# Patient Record
Sex: MALE | Race: WHITE | NOT HISPANIC OR LATINO | Employment: OTHER | URBAN - METROPOLITAN AREA
[De-identification: names, ages, dates, MRNs, and addresses within clinical notes are randomized per-mention and may not be internally consistent; named-entity substitution may affect disease eponyms.]

---

## 2022-01-01 ENCOUNTER — HOSPITAL ENCOUNTER (INPATIENT)
Facility: HOSPITAL | Age: 67
LOS: 17 days | Discharge: HOME | End: 2022-01-19
Attending: EMERGENCY MEDICINE | Admitting: INTERNAL MEDICINE

## 2022-01-01 ENCOUNTER — APPOINTMENT (OUTPATIENT)
Dept: CT IMAGING | Facility: HOSPITAL | Age: 67
End: 2022-01-01

## 2022-01-01 DIAGNOSIS — J94.2 HEMOTHORAX ON RIGHT: ICD-10-CM

## 2022-01-01 DIAGNOSIS — D62 ACUTE BLOOD LOSS ANEMIA: ICD-10-CM

## 2022-01-01 DIAGNOSIS — Z95.2 MECHANICAL HEART VALVE PRESENT: ICD-10-CM

## 2022-01-01 DIAGNOSIS — Z79.01 CHRONIC ANTICOAGULATION: ICD-10-CM

## 2022-01-01 DIAGNOSIS — J94.2 HEMOTHORAX: ICD-10-CM

## 2022-01-01 DIAGNOSIS — S30.1XXA ABDOMINAL WALL HEMATOMA, INITIAL ENCOUNTER: Primary | ICD-10-CM

## 2022-01-01 LAB
ALBUMIN SERPL-MCNC: 3.8 G/DL (ref 3.5–5.2)
ALBUMIN/GLOB SERPL: 1.7 G/DL
ALP SERPL-CCNC: 58 U/L (ref 39–117)
ALT SERPL W P-5'-P-CCNC: 21 U/L (ref 1–41)
ANION GAP SERPL CALCULATED.3IONS-SCNC: 8.9 MMOL/L (ref 5–15)
AST SERPL-CCNC: 27 U/L (ref 1–40)
BASOPHILS # BLD AUTO: 0.05 10*3/MM3 (ref 0–0.2)
BASOPHILS NFR BLD AUTO: 0.5 % (ref 0–1.5)
BILIRUB SERPL-MCNC: 0.7 MG/DL (ref 0–1.2)
BUN SERPL-MCNC: 13 MG/DL (ref 8–23)
BUN/CREAT SERPL: 17.1 (ref 7–25)
CALCIUM SPEC-SCNC: 8.8 MG/DL (ref 8.6–10.5)
CHLORIDE SERPL-SCNC: 96 MMOL/L (ref 98–107)
CO2 SERPL-SCNC: 26.1 MMOL/L (ref 22–29)
CREAT SERPL-MCNC: 0.76 MG/DL (ref 0.76–1.27)
DEPRECATED RDW RBC AUTO: 45.8 FL (ref 37–54)
EOSINOPHIL # BLD AUTO: 0.1 10*3/MM3 (ref 0–0.4)
EOSINOPHIL NFR BLD AUTO: 0.9 % (ref 0.3–6.2)
ERYTHROCYTE [DISTWIDTH] IN BLOOD BY AUTOMATED COUNT: 13 % (ref 12.3–15.4)
GFR SERPL CREATININE-BSD FRML MDRD: 103 ML/MIN/1.73
GLOBULIN UR ELPH-MCNC: 2.2 GM/DL
GLUCOSE SERPL-MCNC: 121 MG/DL (ref 65–99)
HCT VFR BLD AUTO: 26.6 % (ref 37.5–51)
HGB BLD-MCNC: 9 G/DL (ref 13–17.7)
IMM GRANULOCYTES # BLD AUTO: 0.09 10*3/MM3 (ref 0–0.05)
IMM GRANULOCYTES NFR BLD AUTO: 0.8 % (ref 0–0.5)
INR PPP: 2 (ref 0.9–1.1)
INR PPP: 2.14 (ref 0.9–1.1)
LYMPHOCYTES # BLD AUTO: 1.08 10*3/MM3 (ref 0.7–3.1)
LYMPHOCYTES NFR BLD AUTO: 9.9 % (ref 19.6–45.3)
MCH RBC QN AUTO: 32.5 PG (ref 26.6–33)
MCHC RBC AUTO-ENTMCNC: 33.8 G/DL (ref 31.5–35.7)
MCV RBC AUTO: 96 FL (ref 79–97)
MONOCYTES # BLD AUTO: 0.84 10*3/MM3 (ref 0.1–0.9)
MONOCYTES NFR BLD AUTO: 7.7 % (ref 5–12)
NEUTROPHILS NFR BLD AUTO: 8.8 10*3/MM3 (ref 1.7–7)
NEUTROPHILS NFR BLD AUTO: 80.2 % (ref 42.7–76)
NRBC BLD AUTO-RTO: 0.1 /100 WBC (ref 0–0.2)
PLATELET # BLD AUTO: 217 10*3/MM3 (ref 140–450)
PMV BLD AUTO: 9.6 FL (ref 6–12)
POTASSIUM SERPL-SCNC: 4.3 MMOL/L (ref 3.5–5.2)
PROT SERPL-MCNC: 6 G/DL (ref 6–8.5)
PROTHROMBIN TIME: 22.5 SECONDS (ref 11.7–14.2)
PROTHROMBIN TIME: 23.7 SECONDS (ref 11.7–14.2)
RBC # BLD AUTO: 2.77 10*6/MM3 (ref 4.14–5.8)
SARS-COV-2 RNA RESP QL NAA+PROBE: NOT DETECTED
SODIUM SERPL-SCNC: 131 MMOL/L (ref 136–145)
WBC NRBC COR # BLD: 10.96 10*3/MM3 (ref 3.4–10.8)

## 2022-01-01 PROCEDURE — 25010000002 IOPAMIDOL 61 % SOLUTION: Performed by: EMERGENCY MEDICINE

## 2022-01-01 PROCEDURE — G0378 HOSPITAL OBSERVATION PER HR: HCPCS

## 2022-01-01 PROCEDURE — 85025 COMPLETE CBC W/AUTO DIFF WBC: CPT | Performed by: EMERGENCY MEDICINE

## 2022-01-01 PROCEDURE — 85610 PROTHROMBIN TIME: CPT | Performed by: EMERGENCY MEDICINE

## 2022-01-01 PROCEDURE — 85610 PROTHROMBIN TIME: CPT | Performed by: INTERNAL MEDICINE

## 2022-01-01 PROCEDURE — 99284 EMERGENCY DEPT VISIT MOD MDM: CPT

## 2022-01-01 PROCEDURE — U0003 INFECTIOUS AGENT DETECTION BY NUCLEIC ACID (DNA OR RNA); SEVERE ACUTE RESPIRATORY SYNDROME CORONAVIRUS 2 (SARS-COV-2) (CORONAVIRUS DISEASE [COVID-19]), AMPLIFIED PROBE TECHNIQUE, MAKING USE OF HIGH THROUGHPUT TECHNOLOGIES AS DESCRIBED BY CMS-2020-01-R: HCPCS | Performed by: EMERGENCY MEDICINE

## 2022-01-01 PROCEDURE — 71250 CT THORAX DX C-: CPT

## 2022-01-01 PROCEDURE — 80053 COMPREHEN METABOLIC PANEL: CPT | Performed by: EMERGENCY MEDICINE

## 2022-01-01 PROCEDURE — 36415 COLL VENOUS BLD VENIPUNCTURE: CPT | Performed by: INTERNAL MEDICINE

## 2022-01-01 PROCEDURE — 74177 CT ABD & PELVIS W/CONTRAST: CPT

## 2022-01-01 RX ORDER — LISINOPRIL 10 MG/1
10 TABLET ORAL DAILY
Status: DISCONTINUED | OUTPATIENT
Start: 2022-01-02 | End: 2022-01-02

## 2022-01-01 RX ORDER — LOVASTATIN 40 MG/1
40 TABLET ORAL NIGHTLY
COMMUNITY

## 2022-01-01 RX ORDER — ASPIRIN 81 MG/1
81 TABLET, CHEWABLE ORAL DAILY
COMMUNITY
End: 2022-01-19 | Stop reason: HOSPADM

## 2022-01-01 RX ORDER — LISINOPRIL 10 MG/1
10 TABLET ORAL DAILY
COMMUNITY

## 2022-01-01 RX ORDER — BENZONATATE 100 MG/1
100 CAPSULE ORAL DAILY
COMMUNITY
Start: 2021-12-29 | End: 2022-01-19 | Stop reason: HOSPADM

## 2022-01-01 RX ORDER — NITROGLYCERIN 0.4 MG/1
0.4 TABLET SUBLINGUAL
Status: DISCONTINUED | OUTPATIENT
Start: 2022-01-01 | End: 2022-01-19 | Stop reason: HOSPADM

## 2022-01-01 RX ORDER — ATORVASTATIN CALCIUM 20 MG/1
10 TABLET, FILM COATED ORAL DAILY
Status: DISCONTINUED | OUTPATIENT
Start: 2022-01-02 | End: 2022-01-19 | Stop reason: HOSPADM

## 2022-01-01 RX ORDER — ACETAMINOPHEN 325 MG/1
650 TABLET ORAL EVERY 4 HOURS PRN
Status: DISCONTINUED | OUTPATIENT
Start: 2022-01-01 | End: 2022-01-19 | Stop reason: HOSPADM

## 2022-01-01 RX ORDER — AMOXICILLIN AND CLAVULANATE POTASSIUM 875; 125 MG/1; MG/1
1 TABLET, FILM COATED ORAL 2 TIMES DAILY
COMMUNITY
End: 2022-01-19 | Stop reason: HOSPADM

## 2022-01-01 RX ORDER — AZITHROMYCIN 250 MG/1
250 TABLET, FILM COATED ORAL DAILY
COMMUNITY
Start: 2021-12-29 | End: 2022-01-19 | Stop reason: HOSPADM

## 2022-01-01 RX ORDER — UREA 10 %
3 LOTION (ML) TOPICAL NIGHTLY PRN
Status: DISCONTINUED | OUTPATIENT
Start: 2022-01-01 | End: 2022-01-19 | Stop reason: HOSPADM

## 2022-01-01 RX ORDER — ASPIRIN 81 MG/1
81 TABLET, CHEWABLE ORAL DAILY
Status: DISCONTINUED | OUTPATIENT
Start: 2022-01-02 | End: 2022-01-10

## 2022-01-01 RX ORDER — BENZONATATE 100 MG/1
100 CAPSULE ORAL DAILY
Status: COMPLETED | OUTPATIENT
Start: 2022-01-02 | End: 2022-01-05

## 2022-01-01 RX ORDER — ONDANSETRON 2 MG/ML
4 INJECTION INTRAMUSCULAR; INTRAVENOUS EVERY 6 HOURS PRN
Status: DISCONTINUED | OUTPATIENT
Start: 2022-01-01 | End: 2022-01-19 | Stop reason: HOSPADM

## 2022-01-01 RX ORDER — WARFARIN SODIUM 5 MG/1
5 TABLET ORAL
COMMUNITY

## 2022-01-01 RX ORDER — METOPROLOL SUCCINATE 100 MG/1
100 TABLET, EXTENDED RELEASE ORAL DAILY
Status: ON HOLD | COMMUNITY
End: 2022-01-19 | Stop reason: SDUPTHER

## 2022-01-01 RX ORDER — METOPROLOL SUCCINATE 100 MG/1
100 TABLET, EXTENDED RELEASE ORAL DAILY
Status: DISCONTINUED | OUTPATIENT
Start: 2022-01-02 | End: 2022-01-10

## 2022-01-01 RX ORDER — ONDANSETRON 4 MG/1
4 TABLET, FILM COATED ORAL EVERY 6 HOURS PRN
Status: DISCONTINUED | OUTPATIENT
Start: 2022-01-01 | End: 2022-01-19 | Stop reason: HOSPADM

## 2022-01-01 RX ORDER — SODIUM CHLORIDE 0.9 % (FLUSH) 0.9 %
10 SYRINGE (ML) INJECTION AS NEEDED
Status: DISCONTINUED | OUTPATIENT
Start: 2022-01-01 | End: 2022-01-19 | Stop reason: HOSPADM

## 2022-01-01 RX ADMIN — IOPAMIDOL 85 ML: 612 INJECTION, SOLUTION INTRAVENOUS at 15:49

## 2022-01-01 NOTE — ED TRIAGE NOTES
All triage performed with this RN wearing appropriate PPE.  Pt placed in mask upon arrival to ED.  Patient c/o right sided abd bruising since Thursday. He reports he had a cough 10 days ago and the pain started in that area. He was seen at [facility] Wednesday and they did a CT, CXR, and labs. He reports he went home and the bruising started the next day. He is on blood thinners.

## 2022-01-01 NOTE — ED PROVIDER NOTES
EMERGENCY DEPARTMENT ENCOUNTER    Room Number:  36/36  Date seen:  1/1/2022  PCP: Shayan Celeste MD  Historian: Patient      HPI:  Chief Complaint: Abdominal wall bruising  A complete HPI/ROS/PMH/PSH/SH/FH are unobtainable due to: Nothing  Context: Claudio Krishna is a 66 y.o. male who presents to the ED c/o abdominal wall bruising onset Thursday.  Patient is on warfarin due to history of heart valve replacement.  He has had a cough that began about 10 days ago.  He was seen on Wednesday at an outside ER where they did a CT of his abdomen and also a chest x-ray and labs.  He was diagnosed with pneumonia and started on Augmentin and azithromycin.  On Thursday he noticed this right-sided abdominal wall swelling and bruising.  He went to urgent care today and was told to go immediately to the emergency department.  He denies significant pain at this time.  He denies fever or chills.  He denies syncope or near syncope.  He denies black or bloody stool.  He reports that his INR was checked last week and it was within therapeutic range.            PAST MEDICAL HISTORY  Active Ambulatory Problems     Diagnosis Date Noted   • No Active Ambulatory Problems     Resolved Ambulatory Problems     Diagnosis Date Noted   • No Resolved Ambulatory Problems     Past Medical History:   Diagnosis Date   • Coronary artery disease    • Hypertension          PAST SURGICAL HISTORY  Past Surgical History:   Procedure Laterality Date   • AORTA SURGERY  05/27/2011   • HERNIA REPAIR     • KNEE MINI REVISION  2006         FAMILY HISTORY  History reviewed. No pertinent family history.      SOCIAL HISTORY  Social History     Socioeconomic History   • Marital status:    Tobacco Use   • Smoking status: Never Smoker   Substance and Sexual Activity   • Alcohol use: Never   • Drug use: Never   • Sexual activity: Defer         ALLERGIES  Patient has no known allergies.        REVIEW OF SYSTEMS  Review of Systems   Review of all 14 systems is  negative other than stated in the HPI above.      PHYSICAL EXAM  ED Triage Vitals [01/01/22 1354]   Temp Heart Rate Resp BP SpO2   97.1 °F (36.2 °C) 74 18 -- 96 %      Temp src Heart Rate Source Patient Position BP Location FiO2 (%)   Tympanic Monitor -- -- --         GENERAL: Awake and alert, no acute distress  HENT: nares patent  EYES: no scleral icterus, EOMI  CV: regular rhythm, normal rate  RESPIRATORY: normal effort  ABDOMEN: soft, mild right-sided abdominal swelling.  No significant tenderness to touch throughout the abdomen.  There is extensive abdominal wall ecchymosis along the right side of the abdomen.  MUSCULOSKELETAL: no deformity  NEURO: alert, moves all extremities, follows commands  PSYCH:  calm, cooperative  SKIN: warm, dry, right abdominal wall ecchymosis    Vital signs and nursing notes reviewed.          LAB RESULTS  Recent Results (from the past 24 hour(s))   Comprehensive Metabolic Panel    Collection Time: 01/01/22  2:46 PM    Specimen: Blood   Result Value Ref Range    Glucose 121 (H) 65 - 99 mg/dL    BUN 13 8 - 23 mg/dL    Creatinine 0.76 0.76 - 1.27 mg/dL    Sodium 131 (L) 136 - 145 mmol/L    Potassium 4.3 3.5 - 5.2 mmol/L    Chloride 96 (L) 98 - 107 mmol/L    CO2 26.1 22.0 - 29.0 mmol/L    Calcium 8.8 8.6 - 10.5 mg/dL    Total Protein 6.0 6.0 - 8.5 g/dL    Albumin 3.80 3.50 - 5.20 g/dL    ALT (SGPT) 21 1 - 41 U/L    AST (SGOT) 27 1 - 40 U/L    Alkaline Phosphatase 58 39 - 117 U/L    Total Bilirubin 0.7 0.0 - 1.2 mg/dL    eGFR Non African Amer 103 >60 mL/min/1.73    Globulin 2.2 gm/dL    A/G Ratio 1.7 g/dL    BUN/Creatinine Ratio 17.1 7.0 - 25.0    Anion Gap 8.9 5.0 - 15.0 mmol/L   Protime-INR    Collection Time: 01/01/22  2:46 PM    Specimen: Blood   Result Value Ref Range    Protime 23.7 (H) 11.7 - 14.2 Seconds    INR 2.14 (H) 0.90 - 1.10   CBC Auto Differential    Collection Time: 01/01/22  2:46 PM    Specimen: Blood   Result Value Ref Range    WBC 10.96 (H) 3.40 - 10.80 10*3/mm3    RBC  2.77 (L) 4.14 - 5.80 10*6/mm3    Hemoglobin 9.0 (L) 13.0 - 17.7 g/dL    Hematocrit 26.6 (L) 37.5 - 51.0 %    MCV 96.0 79.0 - 97.0 fL    MCH 32.5 26.6 - 33.0 pg    MCHC 33.8 31.5 - 35.7 g/dL    RDW 13.0 12.3 - 15.4 %    RDW-SD 45.8 37.0 - 54.0 fl    MPV 9.6 6.0 - 12.0 fL    Platelets 217 140 - 450 10*3/mm3    Neutrophil % 80.2 (H) 42.7 - 76.0 %    Lymphocyte % 9.9 (L) 19.6 - 45.3 %    Monocyte % 7.7 5.0 - 12.0 %    Eosinophil % 0.9 0.3 - 6.2 %    Basophil % 0.5 0.0 - 1.5 %    Immature Grans % 0.8 (H) 0.0 - 0.5 %    Neutrophils, Absolute 8.80 (H) 1.70 - 7.00 10*3/mm3    Lymphocytes, Absolute 1.08 0.70 - 3.10 10*3/mm3    Monocytes, Absolute 0.84 0.10 - 0.90 10*3/mm3    Eosinophils, Absolute 0.10 0.00 - 0.40 10*3/mm3    Basophils, Absolute 0.05 0.00 - 0.20 10*3/mm3    Immature Grans, Absolute 0.09 (H) 0.00 - 0.05 10*3/mm3    nRBC 0.1 0.0 - 0.2 /100 WBC       Ordered the above labs and reviewed the results.        RADIOLOGY  CT Abdomen Pelvis With Contrast, CT Chest Without Contrast Diagnostic    Result Date: 1/1/2022  CT CHEST WO CONTRAST DIAGNOSTIC-, CT ABDOMEN PELVIS W CONTRAST-  Radiation dose reduction techniques were utilized, including automated exposure control and exposure modulation based on body size.  CLINICAL: Right abdominal wall ecchymosis, cough, patient anticoagulated. Evaluate for hematoma.  COMPARISON: None.  FINDINGS: Located within the subcutaneous fat of the right abdominal wall laterally, there is a 25 x 15 x 4 cm curvilinear density with surrounding subcutaneous fat edema. This is just superficial to the abdominal wall musculature and consistent with abdominal wall hematoma and edema.  There is a complex pleural effusion demonstrated at the right lung base with areas of increased density, likely blood. The appearance consistent with hemothorax. Fluid tracks into the major fissure. It is small in size. There is a curvilinear area of consolidation or atelectasis involving the right lower lobe. No  rib fracture. No acute osseous abnormality seen.  Calcified benign granuloma in the left lower lobe with associated calcified benign left hilar lymph nodes. The left lung is otherwise clear.  There is cardiac enlargement no pericardial effusion. Prior aortic valve repair. Esophagus is satisfactory in course and caliber. Diameter of the aorta is within normal limits, there is atherosclerotic plaque formation. There are median sternotomy wires in position. Nominal coronary artery calcification.  The liver, gallbladder, pancreas, spleen, adrenal glands and right kidney are normal. No biliary duct dilatation. Stomach is collapsed, contour within normal limits.  The left kidney is normal with the exception of a 3.8 cm renal cysts. No renal calculus or obstructive uropathy on the right or left. The ureters are normal in course and caliber to the urinary bladder which is typical in appearance.  Prostate and seminal vesicles unremarkable.  There is diverticulosis of the colon, no diverticulitis. The small bowel is satisfactory in appearance. Diameter of the aorta is normal. No abdominal or pelvic adenopathy seen.  There is a periumbilical hernia containing only mesenteric fat.  Findings of this report called to Dr. Barnhart at the time of completion, 4:30 PM.  CONCLUSION: 1. Curvilinear density within the right lateral abdominal wall consistent with hematoma. 2. Small right-sided pleural effusion with high density areas within, consistent with hemothorax. There is an area of atelectasis/consolidation at the right lung base, potential pneumonia. 3. Cardiomegaly. 4. Diverticulosis of the colon. 5. Periumbilical hernia.           Ordered the above noted radiological studies. Reviewed by me in PACS.            PROCEDURES  Procedures            MEDICATIONS GIVEN IN ER  Medications   sodium chloride 0.9 % flush 10 mL (has no administration in time range)   iopamidol (ISOVUE-300) 61 % injection 100 mL (85 mL Intravenous Given  1/1/22 1549)                   MEDICAL DECISION MAKING, PROGRESS, and CONSULTS    All labs have been independently reviewed by me.  All radiology studies have been reviewed by me and discussed with radiologist dictating the report.   EKG's independently viewed and interpreted by me.  Discussion below represents my analysis of pertinent findings related to patient's condition, differential diagnosis, treatment plan and final disposition.      Differential diagnosis includes but is not limited to:  Rectus sheath hematoma  Retroperitoneal hematoma  Abdominal wall hematoma  Supratherapeutic INR      ED Course as of 01/01/22 1706   Sat Jan 01, 2022   1614 Medical record review: I reviewed emergency department visit from [facility] on 12/29/2021.  Hemoglobin was 12.4 at that time. [JR]   1628 I discussed the CT chest and CT abdomen pelvis with Dr. Read, radiologist.  There is some evidence of hematoma along the anterior lateral right abdominal wall.  There is no retroperitoneal hematoma.  There is a right pleural effusion that looks concerning for hemothorax given mixed density.  Also questionable consolidation in the right lung.  There is no rib fracture identified. [JR]   1652 Patient reassessed.  I explained his CT findings and my concern that he may be having some slow continued bleeding along the right abdominal wall or in the right hemithorax.  Although he has dropped his hemoglobin to 9.0 today from 12.4 on 12/29/2021, he is hemodynamically stable and his INR is in therapeutic range today.  I explained that currently I think that the risk of reversing his anticoagulation with mechanical heart valve would outweigh the benefits.  However I have recommended admission for observation and serial hemoglobin checks as if his hemoglobin continues to fall he may very well warrant reversal of anticoagulation.  Patient is agreeable with plan. [JR]   1704 Discussed with Dr. Renee, [facility], who agrees to admit. [JR]   1705  INR(!): 2.14 [JR]   1705 Hemoglobin(!): 9.0 [JR]   1705 Platelets: 217 [JR]      ED Course User Index  [JR] Dylan Barnhart MD              I wore an N95 mask, face shield, and gloves during this patient encounter.  Patient also wearing a surgical mask.  Hand hygeine performed before and after seeing the patient.    DIAGNOSIS  Final diagnoses:   Abdominal wall hematoma, initial encounter   Hemothorax on right   Chronic anticoagulation   Mechanical heart valve present         DISPOSITION  ADMIT            Latest Documented Vital Signs:  As of 17:06 EST  BP- 126/68 HR- 75 Temp- 97.1 °F (36.2 °C) (Tympanic) O2 sat- 97%        --    Please note that portions of this were completed with a voice recognition program.          Dylan Barnhart MD  01/01/22 3851

## 2022-01-01 NOTE — ED NOTES
Pt asked about taking nightly blood thinner, that medication held at this time, and pt asking if additional nightly medication can be taken. MD Solanol aware.      Rick Joseph, RN  01/01/22 1406

## 2022-01-02 LAB
ANION GAP SERPL CALCULATED.3IONS-SCNC: 8.2 MMOL/L (ref 5–15)
BUN SERPL-MCNC: 12 MG/DL (ref 8–23)
BUN/CREAT SERPL: 16.4 (ref 7–25)
CALCIUM SPEC-SCNC: 8.3 MG/DL (ref 8.6–10.5)
CHLORIDE SERPL-SCNC: 101 MMOL/L (ref 98–107)
CO2 SERPL-SCNC: 25.8 MMOL/L (ref 22–29)
CREAT SERPL-MCNC: 0.73 MG/DL (ref 0.76–1.27)
DEPRECATED RDW RBC AUTO: 45.8 FL (ref 37–54)
ERYTHROCYTE [DISTWIDTH] IN BLOOD BY AUTOMATED COUNT: 13.1 % (ref 12.3–15.4)
GFR SERPL CREATININE-BSD FRML MDRD: 107 ML/MIN/1.73
GLUCOSE SERPL-MCNC: 130 MG/DL (ref 65–99)
HCT VFR BLD AUTO: 24.1 % (ref 37.5–51)
HCT VFR BLD AUTO: 25.1 % (ref 37.5–51)
HCT VFR BLD AUTO: 26.7 % (ref 37.5–51)
HGB BLD-MCNC: 8.1 G/DL (ref 13–17.7)
HGB BLD-MCNC: 8.3 G/DL (ref 13–17.7)
HGB BLD-MCNC: 8.9 G/DL (ref 13–17.7)
INR PPP: 2.06 (ref 0.9–1.1)
MCH RBC QN AUTO: 32.4 PG (ref 26.6–33)
MCHC RBC AUTO-ENTMCNC: 33.6 G/DL (ref 31.5–35.7)
MCV RBC AUTO: 96.4 FL (ref 79–97)
PLATELET # BLD AUTO: 197 10*3/MM3 (ref 140–450)
PMV BLD AUTO: 10 FL (ref 6–12)
POTASSIUM SERPL-SCNC: 4.3 MMOL/L (ref 3.5–5.2)
PROCALCITONIN SERPL-MCNC: 0.04 NG/ML (ref 0–0.25)
PROTHROMBIN TIME: 22.9 SECONDS (ref 11.7–14.2)
RBC # BLD AUTO: 2.5 10*6/MM3 (ref 4.14–5.8)
SODIUM SERPL-SCNC: 135 MMOL/L (ref 136–145)
WBC NRBC COR # BLD: 8.08 10*3/MM3 (ref 3.4–10.8)

## 2022-01-02 PROCEDURE — 85014 HEMATOCRIT: CPT | Performed by: STUDENT IN AN ORGANIZED HEALTH CARE EDUCATION/TRAINING PROGRAM

## 2022-01-02 PROCEDURE — 85610 PROTHROMBIN TIME: CPT | Performed by: INTERNAL MEDICINE

## 2022-01-02 PROCEDURE — 84145 PROCALCITONIN (PCT): CPT | Performed by: STUDENT IN AN ORGANIZED HEALTH CARE EDUCATION/TRAINING PROGRAM

## 2022-01-02 PROCEDURE — 99232 SBSQ HOSP IP/OBS MODERATE 35: CPT | Performed by: THORACIC SURGERY (CARDIOTHORACIC VASCULAR SURGERY)

## 2022-01-02 PROCEDURE — 99222 1ST HOSP IP/OBS MODERATE 55: CPT | Performed by: SURGERY

## 2022-01-02 PROCEDURE — 80048 BASIC METABOLIC PNL TOTAL CA: CPT | Performed by: INTERNAL MEDICINE

## 2022-01-02 PROCEDURE — 85027 COMPLETE CBC AUTOMATED: CPT | Performed by: INTERNAL MEDICINE

## 2022-01-02 PROCEDURE — 85018 HEMOGLOBIN: CPT | Performed by: STUDENT IN AN ORGANIZED HEALTH CARE EDUCATION/TRAINING PROGRAM

## 2022-01-02 RX ADMIN — METOPROLOL SUCCINATE 100 MG: 100 TABLET, EXTENDED RELEASE ORAL at 08:55

## 2022-01-02 RX ADMIN — BENZONATATE 100 MG: 100 CAPSULE ORAL at 08:55

## 2022-01-02 RX ADMIN — ATORVASTATIN CALCIUM 10 MG: 20 TABLET, FILM COATED ORAL at 08:55

## 2022-01-02 RX ADMIN — LISINOPRIL 10 MG: 10 TABLET ORAL at 08:56

## 2022-01-02 RX ADMIN — ASPIRIN 81 MG: 81 TABLET, CHEWABLE ORAL at 08:55

## 2022-01-02 NOTE — CONSULTS
Initial Hospital Consult    Reason for consult: Pleural effusion  Requesting physician: Thelma    Chief complaint: Right flank hematoma, anterior chest and abdominal pain      Subjective     History of Present Illness  Mr. Krishna is a pleasant 66-year-old gentleman with a history of a mechanical heart valve on Coumadin who presented to an outside facility on Wednesday the past week secondary to a worsening cough.  He states that he has had a cough for few weeks and noted that over the past few days he has been experiencing worsening anterior chest wall and abdominal pain associated with his cough.  He also noted swelling and bruising over his right flank which prompted his evaluation in our emergency department.  He was found to have a right flank hematoma and admitted for monitoring.  Review of Systems   Constitutional: Negative.    HENT: Negative.    Eyes: Negative.    Respiratory: Positive for cough and shortness of breath.    Cardiovascular: Positive for chest pain.   Gastrointestinal: Negative.    Endocrine: Negative.    Genitourinary: Negative.    Musculoskeletal: Negative.    Skin: Negative.    Allergic/Immunologic: Negative.    Neurological: Negative.    Hematological: Negative.    Psychiatric/Behavioral: Negative.         Past Medical History:   Diagnosis Date   • Coronary artery disease    • Hypertension    ,   Past Surgical History:   Procedure Laterality Date   • AORTA SURGERY  05/27/2011   • HERNIA REPAIR     • KNEE MINI REVISION  2006   , History reviewed. No pertinent family history.,   Social History     Socioeconomic History   • Marital status:    Tobacco Use   • Smoking status: Never Smoker   Substance and Sexual Activity   • Alcohol use: Never   • Drug use: Never   • Sexual activity: Defer     E-cigarette/Vaping     E-cigarette/Vaping Substances     E-cigarette/Vaping Devices       ,   Medications Prior to Admission   Medication Sig Dispense Refill Last Dose   • amoxicillin-clavulanate  (AUGMENTIN) 875-125 MG per tablet Take 1 tablet by mouth 2 (Two) Times a Day.      • aspirin 81 MG chewable tablet Chew 81 mg Daily.      • azithromycin (Zithromax Z-Ben) 250 MG tablet Take 250 mg by mouth Daily.      • benzonatate (Tessalon Perles) 100 MG capsule Take 100 mg by mouth Daily.      • lisinopril (PRINIVIL,ZESTRIL) 10 MG tablet Take 10 mg by mouth Daily.      • lovastatin (MEVACOR) 40 MG tablet Take 40 mg by mouth Every Night.      • metoprolol succinate XL (TOPROL-XL) 100 MG 24 hr tablet Take 100 mg by mouth Daily.      • Misc Natural Products (OSTEO BI-FLEX/5-LOXIN ADVANCED PO) Take  by mouth.      • warfarin (COUMADIN) 5 MG tablet Take 5 mg by mouth Daily.       and Allergies:  Patient has no known allergies.    Objective      Vital Signs  Temp:  [98.1 °F (36.7 °C)-98.5 °F (36.9 °C)] 98.1 °F (36.7 °C)  Heart Rate:  [62-75] 63  Resp:  [18-20] 18  BP: (126-148)/(66-80) 130/72    Physical Exam  Abdominal:          Comments: Large right flank hematoma         Results Review:    I reviewed the patient's new clinical results.  I reviewed the patient's new imaging results and agree with the interpretation.  I reviewed the patient's other test results and agree with the interpretation    Lab Results:                WBC   Date Value Ref Range Status   01/02/2022 8.08 3.40 - 10.80 10*3/mm3 Final     Hemoglobin   Date Value Ref Range Status   01/02/2022 8.9 (L) 13.0 - 17.7 g/dL Final     Hematocrit   Date Value Ref Range Status   01/02/2022 26.7 (L) 37.5 - 51.0 % Final     Platelets   Date Value Ref Range Status   01/02/2022 197 140 - 450 10*3/mm3 Final     Sodium   Date Value Ref Range Status   01/02/2022 135 (L) 136 - 145 mmol/L Final     Potassium   Date Value Ref Range Status   01/02/2022 4.3 3.5 - 5.2 mmol/L Final     Comment:     Slight hemolysis detected by analyzer. Results may be affected.     Chloride   Date Value Ref Range Status   01/02/2022 101 98 - 107 mmol/L Final     CO2   Date Value Ref Range  Status   01/02/2022 25.8 22.0 - 29.0 mmol/L Final     BUN   Date Value Ref Range Status   01/02/2022 12 8 - 23 mg/dL Final     Creatinine   Date Value Ref Range Status   01/02/2022 0.73 (L) 0.76 - 1.27 mg/dL Final     Glucose   Date Value Ref Range Status   01/02/2022 130 (H) 65 - 99 mg/dL Final     INR   Date Value Ref Range Status   01/02/2022 2.06 (H) 0.90 - 1.10 Final         Assessment/Plan       Abdominal wall hematoma      Assessment & Plan  Mr. Krishna is a pleasant 66-year-old gentleman with a small to moderate right pleural effusion consistent with hemothorax and a right flank hematoma likely secondary to a traumatic fracture from cough.  For his pleural effusion, recommend a thoracentesis.  We will need to monitor his INR and once it is below 2, will plan to perform thoracentesis.  Continue to check serial H&H's.  I discussed the patients findings and my recommendations with patient and nursing staff    Thank you for this consult and allowing us to participate in the care of your patient.  We will follow along with you during this hospitalization.       Sol Andrews MD  [facility]  01/02/22  14:40 EST

## 2022-01-02 NOTE — PLAN OF CARE
Goal Outcome Evaluation:  Plan of Care Reviewed With: patient        Progress: improving  Outcome Summary: pt arrived 4E at 2130-camila, wife at bedside. Home meds amoxicillin and tessalon in purse with pt's wife, will take it home tomorrrow. [place] provided for dinner. Cough frequently non-productive. Large area of brusing on RLQ, Dr. Renee and ER doctors have seen it per pt. VSS, will continue to monitor.

## 2022-01-02 NOTE — ED NOTES
Nursing report ED to floor  Claudio Krishna  66 y.o.  male    HPI :   Chief Complaint   Patient presents with   • Abdominal Pain     large amount of bruising to right side of abd       Admitting doctor:   Falguni Renee MD    Admitting diagnosis:   The primary encounter diagnosis was Abdominal wall hematoma, initial encounter. Diagnoses of Hemothorax on right, Chronic anticoagulation, and Mechanical heart valve present were also pertinent to this visit.    Code status:   Current Code Status     Date Active Code Status Order ID Comments User Context       1/1/2022 1717 CPR (Attempt to Resuscitate) [id number]  Falguni Renee MD ED     Advance Care Planning Activity      Questions for Current Code Status     Question Answer    Code Status (Patient has no pulse and is not breathing) CPR (Attempt to Resuscitate)    Medical Interventions (Patient has pulse or is breathing) Full          Allergies:   Patient has no known allergies.    Intake and Output  No intake or output data in the 24 hours ending 01/01/22 2000    Weight:       01/01/22  1623   Weight: 102 kg (225 lb)       Most recent vitals:   Vitals:    01/01/22 1625 01/01/22 1809 01/01/22 1911 01/01/22 1931   BP: 126/68   126/66   BP Location:       Patient Position:       Pulse: 75 64 62 64   Resp: 18      Temp:       TempSrc:       SpO2: 97% 96% 96% 94%   Weight:       Height:           Active LDAs/IV Access:   Lines, Drains & Airways     Active LDAs     Name Placement date Placement time Site Days    Peripheral IV 01/01/22 1446 Right Antecubital 01/01/22  1446  Antecubital  less than 1                Labs (abnormal labs have a star):   Labs Reviewed   COMPREHENSIVE METABOLIC PANEL - Abnormal; Notable for the following components:       Result Value    Glucose 121 (*)     Sodium 131 (*)     Chloride 96 (*)     All other components within normal limits    Narrative:     GFR Normal >60  Chronic Kidney Disease <60  Kidney Failure <15     PROTIME-INR -  Abnormal; Notable for the following components:    Protime 23.7 (*)     INR 2.14 (*)     All other components within normal limits   CBC WITH AUTO DIFFERENTIAL - Abnormal; Notable for the following components:    WBC 10.96 (*)     RBC 2.77 (*)     Hemoglobin 9.0 (*)     Hematocrit 26.6 (*)     Neutrophil % 80.2 (*)     Lymphocyte % 9.9 (*)     Immature Grans % 0.8 (*)     Neutrophils, Absolute 8.80 (*)     Immature Grans, Absolute 0.09 (*)     All other components within normal limits   COVID-19, [facility] IN-HOUSE CEPHEID/NAREN, NP SWAB IN TRANSPORT MEDIA 8-12 HR TAT - Normal    Narrative:     Fact sheet for providers: https://www.fda.gov/media/167002/download     Fact sheet for patients: https://www.fda.gov/media/234994/download   COVID PRE-OP / PRE-PROCEDURE SCREENING ORDER (NO ISOLATION)    Narrative:     The following orders were created for panel order COVID PRE-OP / PRE-PROCEDURE SCREENING ORDER (NO ISOLATION) - Swab, Nasopharynx.  Procedure                               Abnormality         Status                     ---------                               -----------         ------                     COVID-19, [facility] IN-HOUSE...[[id number]]  Normal              Final result                 Please view results for these tests on the individual orders.   CBC AND DIFFERENTIAL    Narrative:     The following orders were created for panel order CBC & Differential.  Procedure                               Abnormality         Status                     ---------                               -----------         ------                     CBC Auto Differential[[id number]]        Abnormal            Final result                 Please view results for these tests on the individual orders.       EKG:   No orders to display       Meds given in ED:   Medications   sodium chloride 0.9 % flush 10 mL (has no administration in time range)   nitroglycerin (NITROSTAT) SL tablet 0.4 mg (has no administration in time range)    acetaminophen (TYLENOL) tablet 650 mg (has no administration in time range)   ondansetron (ZOFRAN) tablet 4 mg (has no administration in time range)     Or   ondansetron (ZOFRAN) injection 4 mg (has no administration in time range)   melatonin tablet 3 mg (has no administration in time range)   iopamidol (ISOVUE-300) 61 % injection 100 mL (85 mL Intravenous Given 1/1/22 7750)       Imaging results:  No radiology results for the last day    Ambulatory status:   - independent     Social issues:   Social History     Socioeconomic History   • Marital status:    Tobacco Use   • Smoking status: Never Smoker   Substance and Sexual Activity   • Alcohol use: Never   • Drug use: Never   • Sexual activity: Defer       NIH Stroke Scale:        Nursing report ED to floor:       Carmencita Saul RN  01/01/22 2008

## 2022-01-02 NOTE — H&P
HISTORY AND PHYSICAL   [facility]        Date of Admission: 2022  Patient Identification:  Name: Claudio Krishna  Age: 66 y.o.  Sex: male  :  1955  MRN: [id number]                     Primary Care Physician: Shayan Celeste MD    Chief Complaint:  66 year old gentleman who presented to the emergency room with worsening bruising of his abdomen; he denies any injury but recently had a cough that persisted for several days; he was seen at another ED and placed on antibiotics and cough medicine for this last week; he denies pain but has noted some swelling in the area; no fever or chills; he is on coumadin    History of Present Illness:   As above    Past Medical History:  Past Medical History:   Diagnosis Date   • Coronary artery disease    • Hypertension      Past Surgical History:  Past Surgical History:   Procedure Laterality Date   • AORTA SURGERY  2011   • HERNIA REPAIR     • KNEE MINI REVISION        Home Meds:  (Not in a hospital admission)      Allergies:  No Known Allergies  Immunizations:  Immunization History   Administered Date(s) Administered   • COVID-19 (PFIZER) 2021, 2021, 2021     Social History:   Social History     Social History Narrative   • Not on file     Social History     Socioeconomic History   • Marital status:    Tobacco Use   • Smoking status: Never Smoker   Substance and Sexual Activity   • Alcohol use: Never   • Drug use: Never   • Sexual activity: Defer       Family History:  History reviewed. No pertinent family history.     Review of Systems  See history of present illness and past medical history.  Patient denies headache, dizziness, syncope, falls, trauma, change in vision, change in hearing, change in taste, changes in weight, changes in appetite, focal weakness, numbness, or paresthesia.  Patient denies chest pain, palpitations, dyspnea, orthopnea, PND, cough, sinus pressure, rhinorrhea, epistaxis, hemoptysis, nausea,  "vomiting,hematemesis, diarrhea, constipation or hematchezia.  Denies cold or heat intolerance, polydipsia, polyuria, polyphagia. Denies hematuria, pyuria, dysuria, hesitancy, frequency or urgency. Denies consumption of raw and under cooked meats foods or change in water source.  Denies fever, chills, sweats, night sweats.  Denies missing any routine medications. Remainder of ROS is negative.    Objective:  T Max 24 hrs: Temp (24hrs), Av.1 °F (36.2 °C), Min:97.1 °F (36.2 °C), Max:97.1 °F (36.2 °C)    Vitals Ranges:   Temp:  [97.1 °F (36.2 °C)] 97.1 °F (36.2 °C)  Heart Rate:  [62-75] 64  Resp:  [18] 18  BP: (126-141)/(66-80) 126/66      Exam:  /66   Pulse 64   Temp 97.1 °F (36.2 °C) (Tympanic)   Resp 18   Ht 177.8 cm (70\")   Wt 102 kg (225 lb)   SpO2 94%   BMI 32.28 kg/m²     General Appearance:    Alert, cooperative, no distress, appears stated age   Head:    Normocephalic, without obvious abnormality, atraumatic   Eyes:    PERRL, conjunctivae/corneas clear, EOM's intact, both eyes   Ears:    Normal external ear canals, both ears   Nose:   Nares normal, septum midline, mucosa normal, no drainage    or sinus tenderness   Throat:   Lips, mucosa, and tongue normal   Neck:   Supple, symmetrical, trachea midline, no adenopathy;     thyroid:  no enlargement/tenderness/nodules; no carotid    bruit or JVD   Back:     Symmetric, no curvature, ROM normal, no CVA tenderness   Lungs:     Decreased breath sounds bilaterally, respirations unlabored   Chest Wall:    No tenderness or deformity    Heart:    Regular rate and rhythm, S1 and S2 normal, no murmur, rub   or gallop   Abdomen:     Soft, nontender, bowel sounds active all four quadrants,     no masses, no hepatomegaly, no splenomegaly   Extremities:   Extremities normal, atraumatic, no cyanosis or edema   Pulses:   2+ and symmetric all extremities   Skin:   Skin color, texture, turgor normal, no rashes or lesions   Lymph nodes:   Cervical, supraclavicular, " and axillary nodes normal   Neurologic:   CNII-XII intact, normal strength, sensation intact throughout      .    Data Review:  Labs in chart were reviewed.  WBC   Date Value Ref Range Status   01/01/2022 10.96 (H) 3.40 - 10.80 10*3/mm3 Final     Hemoglobin   Date Value Ref Range Status   01/01/2022 9.0 (L) 13.0 - 17.7 g/dL Final     Hematocrit   Date Value Ref Range Status   01/01/2022 26.6 (L) 37.5 - 51.0 % Final     Platelets   Date Value Ref Range Status   01/01/2022 217 140 - 450 10*3/mm3 Final     Sodium   Date Value Ref Range Status   01/01/2022 131 (L) 136 - 145 mmol/L Final     Potassium   Date Value Ref Range Status   01/01/2022 4.3 3.5 - 5.2 mmol/L Final     Chloride   Date Value Ref Range Status   01/01/2022 96 (L) 98 - 107 mmol/L Final     CO2   Date Value Ref Range Status   01/01/2022 26.1 22.0 - 29.0 mmol/L Final     BUN   Date Value Ref Range Status   01/01/2022 13 8 - 23 mg/dL Final     Creatinine   Date Value Ref Range Status   01/01/2022 0.76 0.76 - 1.27 mg/dL Final     Glucose   Date Value Ref Range Status   01/01/2022 121 (H) 65 - 99 mg/dL Final     Calcium   Date Value Ref Range Status   01/01/2022 8.8 8.6 - 10.5 mg/dL Final     AST (SGOT)   Date Value Ref Range Status   01/01/2022 27 1 - 40 U/L Final     ALT (SGPT)   Date Value Ref Range Status   01/01/2022 21 1 - 41 U/L Final     Alkaline Phosphatase   Date Value Ref Range Status   01/01/2022 58 39 - 117 U/L Final     INR   Date Value Ref Range Status   01/01/2022 2.14 (H) 0.90 - 1.10 Final                Imaging Results (All)     Procedure Component Value Units Date/Time    CT Abdomen Pelvis With Contrast [[id number]] Collected: 01/01/22 1640     Updated: 01/01/22 1742    Narrative:      CT CHEST WO CONTRAST DIAGNOSTIC-, CT ABDOMEN PELVIS W CONTRAST-     Radiation dose reduction techniques were utilized, including automated  exposure control and exposure modulation based on body size.     CLINICAL: Right abdominal wall ecchymosis, cough,  patient  anticoagulated. Evaluate for hematoma.     COMPARISON: None.     FINDINGS: Located within the subcutaneous fat of the right abdominal  wall laterally, there is a 25 x 15 x 4 cm curvilinear density with  surrounding subcutaneous fat edema. This is just superficial to the  abdominal wall musculature and consistent with abdominal wall hematoma  and edema.     There is a complex pleural effusion demonstrated at the right lung base  with areas of increased density, likely blood. The appearance consistent  with hemothorax. Fluid tracks into the major fissure. It is small in  size. There is a curvilinear area of consolidation or atelectasis  involving the right lower lobe. No rib fracture. No acute osseous  abnormality seen.     Calcified benign granuloma in the left lower lobe with associated  calcified benign left hilar lymph nodes. The left lung is otherwise  clear.     There is cardiac enlargement no pericardial effusion. Prior aortic valve  repair. Esophagus is satisfactory in course and caliber. Diameter of the  aorta is within normal limits, there is atherosclerotic plaque  formation. There are median sternotomy wires in position. Nominal  coronary artery calcification.     The liver, gallbladder, pancreas, spleen, adrenal glands and right  kidney are normal. No biliary duct dilatation. Stomach is collapsed,  contour within normal limits.     The left kidney is normal with the exception of a 3.8 cm renal cysts. No  renal calculus or obstructive uropathy on the right or left. The ureters  are normal in course and caliber to the urinary bladder which is typical  in appearance.     Prostate and seminal vesicles unremarkable.     There is diverticulosis of the colon, no diverticulitis. The small bowel  is satisfactory in appearance. Diameter of the aorta is normal. No  abdominal or pelvic adenopathy seen.     There is a periumbilical hernia containing only mesenteric fat.     Findings of this report called to  Dr. Barnhart at the time of completion,  4:30 PM.     CONCLUSION:  1. Curvilinear density within the right lateral abdominal wall  consistent with hematoma.  2. Small right-sided pleural effusion with high density areas within,  consistent with hemothorax. There is an area of  atelectasis/consolidation at the right lung base, potential pneumonia.  3. Cardiomegaly.  4. Diverticulosis of the colon.  5. Periumbilical hernia.        This report was finalized on 1/1/2022 5:39 PM by Dr. Sunday Raed M.D.       CT Chest Without Contrast Diagnostic [[id number]] Collected: 01/01/22 1640     Updated: 01/01/22 1742    Narrative:      CT CHEST WO CONTRAST DIAGNOSTIC-, CT ABDOMEN PELVIS W CONTRAST-     Radiation dose reduction techniques were utilized, including automated  exposure control and exposure modulation based on body size.     CLINICAL: Right abdominal wall ecchymosis, cough, patient  anticoagulated. Evaluate for hematoma.     COMPARISON: None.     FINDINGS: Located within the subcutaneous fat of the right abdominal  wall laterally, there is a 25 x 15 x 4 cm curvilinear density with  surrounding subcutaneous fat edema. This is just superficial to the  abdominal wall musculature and consistent with abdominal wall hematoma  and edema.     There is a complex pleural effusion demonstrated at the right lung base  with areas of increased density, likely blood. The appearance consistent  with hemothorax. Fluid tracks into the major fissure. It is small in  size. There is a curvilinear area of consolidation or atelectasis  involving the right lower lobe. No rib fracture. No acute osseous  abnormality seen.     Calcified benign granuloma in the left lower lobe with associated  calcified benign left hilar lymph nodes. The left lung is otherwise  clear.     There is cardiac enlargement no pericardial effusion. Prior aortic valve  repair. Esophagus is satisfactory in course and caliber. Diameter of the  aorta is within normal  limits, there is atherosclerotic plaque  formation. There are median sternotomy wires in position. Nominal  coronary artery calcification.     The liver, gallbladder, pancreas, spleen, adrenal glands and right  kidney are normal. No biliary duct dilatation. Stomach is collapsed,  contour within normal limits.     The left kidney is normal with the exception of a 3.8 cm renal cysts. No  renal calculus or obstructive uropathy on the right or left. The ureters  are normal in course and caliber to the urinary bladder which is typical  in appearance.     Prostate and seminal vesicles unremarkable.     There is diverticulosis of the colon, no diverticulitis. The small bowel  is satisfactory in appearance. Diameter of the aorta is normal. No  abdominal or pelvic adenopathy seen.     There is a periumbilical hernia containing only mesenteric fat.     Findings of this report called to Dr. Barnhart at the time of completion,  4:30 PM.     CONCLUSION:  1. Curvilinear density within the right lateral abdominal wall  consistent with hematoma.  2. Small right-sided pleural effusion with high density areas within,  consistent with hemothorax. There is an area of  atelectasis/consolidation at the right lung base, potential pneumonia.  3. Cardiomegaly.  4. Diverticulosis of the colon.  5. Periumbilical hernia.        This report was finalized on 1/1/2022 5:39 PM by Dr. Sunday Read M.D.           Patient Active Problem List   Diagnosis Code   • Abdominal wall hematoma S30.1XXA       Assessment:    Abdominal wall hematoma  right hemithorax  Acute blood loss anemia  Hyponatremia  Cad  Hyperglycemia  hypertension    Plan:  Will hold coumadin tonight  Ask dr carpenter to see him  Trend hgb and sodium  Monitor on telemetry  dw patient and ED Provider    Falguni Renee MD  1/1/2022  20:37 EST

## 2022-01-02 NOTE — CONSULTS
Chief complaint: 66-year-old male with right-sided abdominal hematoma.    Present illness:  On Wednesday evening he was having bad coughing and he went to urgent care.  They started Tessalon and antibiotics.  The next day intensive coughing continued but improved gradually.  Despite this he developed a hematoma on the right side of the abdomen.  He does not have important pain.  No shortness of air.  He feels that the cough is now gone.     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 mechanical prosthesis.   is performing surveillance for the aorta  Normal coronary arteriogram May 2011  In 2011 echocardiogram showed mild left ventricular enlargement and diastolic dimension 64 mm and Concentra hypertrophy with 15 mm diastolic wall dimension.  Echocardiogram November 14, 2014: And diastolic dimension 57 mm. Ejection fraction 62%. Normal flow for aortic valve prosthesis. Mild mitral regurgitation. Trivial aortic regurgitation. Mild tricuspid regurgitation. Right ventricular systolic pressure estimate 31 mm.     CT scan chest October 9, 2019:  Proximal descending thoracic aorta diameter 3.6 cm, no change  Old granulomatous disease of the chest and spleen unchanged      Dyslipidemia with lovastatin: Laboratory October 26, 2020 showed hemoglobin 14.4, triglyceride 111, total cholesterol 167, HDL 46, .     Review of systems:  Gen.: He gives his height as 70 inches, weight 220 pounds. No change  Endocrine: No diabetes, no thyroid disease  Gastrointestinal: No alcoholic beverages. No indigestion, no melanoma, no hematochezia  Cardiovascular: No deep vein thrombosis. No edema. No orthopnea. No palpitation. No dizziness. No syncope.  Genitourinary no dysuria, see present illness   Neurologic: No extremity weakness  All other systems negative     Family:  Mother age 88  Father   age 55 myocardial infarction  Brother age 51 has had screening CT scan  Children ages 33 and 30 are well and have been advised to undergo screening     Allergy: None     Medication:  Aspirin 81 mg daily  Lisinopril 10 mg  Lovastatin 40 mg  Metoprolol succinate 100 mg  Warfarin typically 12.5 mg daily  Osteo Bi-Flex triple strength oral twice daily     He takes amoxicillin 875 as needed before dental work.        Exam:   Blood pressure 135/73, heart rate 63, respiration 18, saturation 95%, temperature 98.3  Gen: Well-developed, obese, elderly white male, no distress  Skin: Large hematoma on the right side of the abdomen.  No tenderness.  Eyes: pupils reactive, no xanthelasma  Oral: Good dentition, no pallor, no dryness  Neck: no thyroid enlargement, normal carotid impulses, no carotid bruit, cvp = 4 cm, normal contours  Spine: no scoliosis  Chest: clear  Cor: [place] midclavicular line. Normal first heart sound. Crisp mechanical second heart sound. No murmur. No gallop.  Ab: soft, no tenderness, no organ enlargement, no aortic enlargement, no bruit, + bowel sounds  Rectal: not done  Ex: no cyanosis, no edema  PV: Full pedal and femoral impulses bilaterally. =   Neuro: alert, oriented x 3, no agitation, intact motor function in all extremities, no tremor, no ataxia     ECG 2021: Sinus bradycardia heart rate 59, right bundle branch block.  Left ventricular hypertrophy.  Voltage is more prominent compared with ECG of 2020.    Laboratory: Hemoglobin 8.9, urea nitrogen 12, creatinine 0.73, sodium 135, potassium 4.3, INR 2.1    CT chest 2022: Right lateral abdominal wall hematoma.  Small right-sided pleural effusion.  Cardiomegaly.  Colon diverticula.     Assessment:   Hematoma related to coughing and systemic anticoagulation.  Fortunately, despite receiving acute antibiotic treatment, his INR was in the therapeutic range.    Recommendation:  If he is feeling well tomorrow I have no  objection to his discharge.  I would advise him to resume his routine dose of warfarin on the evening of Tuesday, January 4.

## 2022-01-02 NOTE — CASE MANAGEMENT/SOCIAL WORK
Discharge Planning Assessment  [facility]     Patient Name: Claudio Krishna  MRN: [id number]  Today's Date: 1/2/2022    Admit Date: 1/1/2022     Discharge Needs Assessment     Row Name 01/02/22 9071       Living Environment    Lives With spouse    Name(s) of Who Lives With Patient wife Leatha    Current Living Arrangements home/apartment/condo    Primary Care Provided by self    Provides Primary Care For no one    Family Caregiver if Needed spouse    Family Caregiver Names wife Leatha [phone]    Quality of Family Relationships helpful    Able to Return to Prior Arrangements yes       Resource/Environmental Concerns    Resource/Environmental Concerns none       Transition Planning    Patient/Family Anticipates Transition to home with family    Patient/Family Anticipated Services at Transition none    Transportation Anticipated family or friend will provide       Discharge Needs Assessment    Readmission Within the Last 30 Days no previous admission in last 30 days    Equipment Currently Used at Home none    Concerns to be Addressed denies needs/concerns at this time    Anticipated Changes Related to Illness none    Equipment Needed After Discharge none               Discharge Plan     Row Name 01/02/22 4595       Plan    Plan Return home with spouse    Patient/Family in Agreement with Plan yes    Plan Comments Spoke with patient at bedside.  He lives with wife Leatha [phone], is IADL, uses no DME, he has used HH ~10 years ago and has never been to SNF.  PCP is Dr. Shayan Celeste and pharmacy is [facility] [address] @ [place].  Patient plans to return home at NH, he states his wife can assist him if needed.  CCP will follow.  BHumeniuk RN              Continued Care and Services - Admitted Since 1/1/2022    Coordination has not been started for this encounter.          Demographic Summary     Row Name 01/02/22 5009       General Information    Admission Type observation    Arrived From home    Referral  Source admission list    Reason for Consult discharge planning    Preferred Language English     Used During This Interaction no               Functional Status     Row Name 01/02/22 1332       Functional Status    Usual Activity Tolerance good    Current Activity Tolerance moderate       Functional Status, IADL    Medications independent    Meal Preparation independent; assistive person  Wife    Housekeeping independent; assistive person    Laundry assistive person    Shopping assistive person; independent       Mental Status    General Appearance WDL WDL       Mental Status Summary    Recent Changes in Mental Status/Cognitive Functioning no changes                         Becky S. Humeniuk, RN

## 2022-01-02 NOTE — CONSULTS
[facility]   Consult Note    Patient Name: Claudio Krishna  : 1955  MRN: [id number]  Primary Care Physician:  Shayan Celeste MD  Referring Physician: Falguni Renee MD  Date of admission: 2022    Inpatient General Surgery Consult  Consult performed by: Jarvis Rodriguez Jr., MD  Consult ordered by: Taniya Nicholas MD        Subjective   Subjective     Reason for Consult/ Chief Complaint: Abdominal wall hematoma    History of Present Illness  Claudio Krishna is a pleasant 66 y.o. male male with a mechanical heart valve requiring Coumadin who developed a significant and persistent cough just before Herlinda.  He was ultimately diagnosed with pneumonia and treated with Augmentin and azithromycin.  The coughing was severe enough that he began having abdominal pain to the point that he states that every cough felt like a knife stabbing him in his abdomen.  He began having bruising along his right abdomen that was extensive.  He presented to the emergency room due to the bruising.  He was evaluated with a CT scan of the chest, abdomen, and pelvis that shows a complex right pleural effusion consistent with a hemothorax and a large but uncomplicated abdominal hematoma.    Review of Systems   Constitutional: Negative for fatigue and fever.   Respiratory: Positive for cough and shortness of breath. Negative for chest tightness and wheezing.    Cardiovascular: Negative for chest pain and palpitations.   Gastrointestinal: Positive for abdominal pain. Negative for blood in stool, constipation, diarrhea, nausea and vomiting.        Personal History     Past Medical History:   Diagnosis Date   • Coronary artery disease    • Hypertension        Past Surgical History:   Procedure Laterality Date   • AORTA SURGERY  2011   • HERNIA REPAIR     • KNEE MINI REVISION         Family History: family history is not on file. Otherwise pertinent FHx was reviewed and not pertinent to current issue.    Social  History:  reports that he has never smoked. He does not have any smokeless tobacco history on file. He reports that he does not drink alcohol and does not use drugs.    Home Medications:   Misc Natural Products, amoxicillin-clavulanate, aspirin, azithromycin, benzonatate, lisinopril, lovastatin, metoprolol succinate XL, and warfarin    Allergies:  No Known Allergies    Objective    Objective     Vitals:  Temp:  [98.1 °F (36.7 °C)-98.5 °F (36.9 °C)] 98.1 °F (36.7 °C)  Heart Rate:  [62-74] 63  Resp:  [18-20] 18  BP: (126-148)/(66-78) 130/72    Physical Exam  Constitutional:       Appearance: Normal appearance. He is well-developed. He is not toxic-appearing.   Eyes:      General: No scleral icterus.  Pulmonary:      Effort: Pulmonary effort is normal. No respiratory distress.   Abdominal:      Palpations: Abdomen is soft.      Tenderness: There is no abdominal tenderness.      Comments: There is extensive bruising along the right flank and abdominal wall.   Skin:     General: Skin is warm and dry.   Neurological:      Mental Status: He is alert and oriented to person, place, and time.   Psychiatric:         Behavior: Behavior normal.         Thought Content: Thought content normal.         Judgment: Judgment normal.         Result Review    Result Review:  I have personally reviewed the results from the time of this admission to 1/2/2022 18:52 EST and agree with these findings:  [x]  Laboratory  []  Microbiology  [x]  Radiology  []  EKG/Telemetry   []  Cardiology/Vascular   []  Pathology  []  Old records  []  Other:    Assessment/Plan   Assessment / Plan     Brief Patient Summary with assessment and plan:  Claudio Krishna is a 66 y.o. male who has extensive abdominal wall bruising related to a large abdominal hematoma that resulted from excessive coughing while on Coumadin.  The hematoma appears uncomplicated on CT scan of the abdomen and pelvis.  His H/H has been stable and there is no evidence of ongoing active  bleeding.  The hematoma will resolve over time.  There is no need for surgical management.  I will sign off but remain available if needed.    Active Hospital Problems:  Active Hospital Problems    Diagnosis    • Abdominal wall hematoma        Jarvis Rodriguez Jr., MD

## 2022-01-02 NOTE — PLAN OF CARE
Goal Outcome Evaluation:              Outcome Summary: VSS. Pt had no complaints this shift. Plan for thoracentisis when INR acceptable. Coumadin on hold. Family at bedside. Will conitnue to monitor.

## 2022-01-03 ENCOUNTER — APPOINTMENT (OUTPATIENT)
Dept: ULTRASOUND IMAGING | Facility: HOSPITAL | Age: 67
End: 2022-01-03

## 2022-01-03 LAB
ANION GAP SERPL CALCULATED.3IONS-SCNC: 5.9 MMOL/L (ref 5–15)
APTT PPP: 35.4 SECONDS (ref 22.7–35.4)
APTT PPP: 56.2 SECONDS (ref 22.7–35.4)
BASOPHILS # BLD AUTO: 0.06 10*3/MM3 (ref 0–0.2)
BASOPHILS NFR BLD AUTO: 0.5 % (ref 0–1.5)
BUN SERPL-MCNC: 12 MG/DL (ref 8–23)
BUN/CREAT SERPL: 16.7 (ref 7–25)
CALCIUM SPEC-SCNC: 8.4 MG/DL (ref 8.6–10.5)
CHLORIDE SERPL-SCNC: 98 MMOL/L (ref 98–107)
CO2 SERPL-SCNC: 28.1 MMOL/L (ref 22–29)
CREAT SERPL-MCNC: 0.72 MG/DL (ref 0.76–1.27)
DEPRECATED RDW RBC AUTO: 45.5 FL (ref 37–54)
DEPRECATED RDW RBC AUTO: 48.6 FL (ref 37–54)
EOSINOPHIL # BLD AUTO: 0.23 10*3/MM3 (ref 0–0.4)
EOSINOPHIL NFR BLD AUTO: 2 % (ref 0.3–6.2)
ERYTHROCYTE [DISTWIDTH] IN BLOOD BY AUTOMATED COUNT: 13 % (ref 12.3–15.4)
ERYTHROCYTE [DISTWIDTH] IN BLOOD BY AUTOMATED COUNT: 13.4 % (ref 12.3–15.4)
GFR SERPL CREATININE-BSD FRML MDRD: 109 ML/MIN/1.73
GLUCOSE FLD-MCNC: 105 MG/DL
GLUCOSE SERPL-MCNC: 114 MG/DL (ref 65–99)
HCT VFR BLD AUTO: 25.8 % (ref 37.5–51)
HCT VFR BLD AUTO: 26.1 % (ref 37.5–51)
HCT VFR BLD AUTO: 26.3 % (ref 37.5–51)
HCT VFR BLD AUTO: 26.3 % (ref 37.5–51)
HCT VFR BLD AUTO: 27 % (ref 37.5–51)
HCT VFR BLD AUTO: 28.2 % (ref 37.5–51)
HCT VFR BLD AUTO: 28.2 % (ref 37.5–51)
HGB BLD-MCNC: 8.7 G/DL (ref 13–17.7)
HGB BLD-MCNC: 8.8 G/DL (ref 13–17.7)
HGB BLD-MCNC: 9 G/DL (ref 13–17.7)
HGB BLD-MCNC: 9.7 G/DL (ref 13–17.7)
HGB BLD-MCNC: 9.7 G/DL (ref 13–17.7)
IMM GRANULOCYTES # BLD AUTO: 0.07 10*3/MM3 (ref 0–0.05)
IMM GRANULOCYTES NFR BLD AUTO: 0.6 % (ref 0–0.5)
INR PPP: 1.41 (ref 0.9–1.1)
INR PPP: 1.58 (ref 0.9–1.1)
LDH FLD-CCNC: >1066 U/L
LYMPHOCYTES # BLD AUTO: 1.3 10*3/MM3 (ref 0.7–3.1)
LYMPHOCYTES NFR BLD AUTO: 11.6 % (ref 19.6–45.3)
MAGNESIUM SERPL-MCNC: 2.2 MG/DL (ref 1.6–2.4)
MCH RBC QN AUTO: 33.2 PG (ref 26.6–33)
MCH RBC QN AUTO: 33.2 PG (ref 26.6–33)
MCHC RBC AUTO-ENTMCNC: 33.5 G/DL (ref 31.5–35.7)
MCHC RBC AUTO-ENTMCNC: 34.4 G/DL (ref 31.5–35.7)
MCV RBC AUTO: 96.6 FL (ref 79–97)
MCV RBC AUTO: 99.2 FL (ref 79–97)
MONOCYTES # BLD AUTO: 0.99 10*3/MM3 (ref 0.1–0.9)
MONOCYTES NFR BLD AUTO: 8.8 % (ref 5–12)
NEUTROPHILS NFR BLD AUTO: 76.5 % (ref 42.7–76)
NEUTROPHILS NFR BLD AUTO: 8.57 10*3/MM3 (ref 1.7–7)
NRBC BLD AUTO-RTO: 0.1 /100 WBC (ref 0–0.2)
PH FLD: 7.5 [PH]
PHOSPHATE SERPL-MCNC: 3.2 MG/DL (ref 2.5–4.5)
PLATELET # BLD AUTO: 232 10*3/MM3 (ref 140–450)
PLATELET # BLD AUTO: 279 10*3/MM3 (ref 140–450)
PMV BLD AUTO: 10.4 FL (ref 6–12)
PMV BLD AUTO: 9.2 FL (ref 6–12)
POTASSIUM SERPL-SCNC: 4.6 MMOL/L (ref 3.5–5.2)
PROT FLD-MCNC: 4 G/DL
PROTHROMBIN TIME: 17.1 SECONDS (ref 11.7–14.2)
PROTHROMBIN TIME: 18.6 SECONDS (ref 11.7–14.2)
RBC # BLD AUTO: 2.65 10*6/MM3 (ref 4.14–5.8)
RBC # BLD AUTO: 2.92 10*6/MM3 (ref 4.14–5.8)
SODIUM SERPL-SCNC: 132 MMOL/L (ref 136–145)
WBC NRBC COR # BLD: 11.22 10*3/MM3 (ref 3.4–10.8)
WBC NRBC COR # BLD: 11.27 10*3/MM3 (ref 3.4–10.8)

## 2022-01-03 PROCEDURE — 83986 ASSAY PH BODY FLUID NOS: CPT | Performed by: NURSE PRACTITIONER

## 2022-01-03 PROCEDURE — 99024 POSTOP FOLLOW-UP VISIT: CPT | Performed by: NURSE PRACTITIONER

## 2022-01-03 PROCEDURE — 85018 HEMOGLOBIN: CPT | Performed by: STUDENT IN AN ORGANIZED HEALTH CARE EDUCATION/TRAINING PROGRAM

## 2022-01-03 PROCEDURE — 25010000002 HEPARIN (PORCINE) 25000-0.45 UT/250ML-% SOLUTION: Performed by: NURSE PRACTITIONER

## 2022-01-03 PROCEDURE — 76942 ECHO GUIDE FOR BIOPSY: CPT

## 2022-01-03 PROCEDURE — 80048 BASIC METABOLIC PNL TOTAL CA: CPT | Performed by: STUDENT IN AN ORGANIZED HEALTH CARE EDUCATION/TRAINING PROGRAM

## 2022-01-03 PROCEDURE — 87205 SMEAR GRAM STAIN: CPT | Performed by: NURSE PRACTITIONER

## 2022-01-03 PROCEDURE — 84145 PROCALCITONIN (PCT): CPT | Performed by: STUDENT IN AN ORGANIZED HEALTH CARE EDUCATION/TRAINING PROGRAM

## 2022-01-03 PROCEDURE — 87070 CULTURE OTHR SPECIMN AEROBIC: CPT | Performed by: NURSE PRACTITIONER

## 2022-01-03 PROCEDURE — 85730 THROMBOPLASTIN TIME PARTIAL: CPT | Performed by: NURSE PRACTITIONER

## 2022-01-03 PROCEDURE — 0 LIDOCAINE 1 % SOLUTION: Performed by: RADIOLOGY

## 2022-01-03 PROCEDURE — 85027 COMPLETE CBC AUTOMATED: CPT | Performed by: STUDENT IN AN ORGANIZED HEALTH CARE EDUCATION/TRAINING PROGRAM

## 2022-01-03 PROCEDURE — 85014 HEMATOCRIT: CPT | Performed by: STUDENT IN AN ORGANIZED HEALTH CARE EDUCATION/TRAINING PROGRAM

## 2022-01-03 PROCEDURE — 85025 COMPLETE CBC W/AUTO DIFF WBC: CPT | Performed by: NURSE PRACTITIONER

## 2022-01-03 PROCEDURE — 87015 SPECIMEN INFECT AGNT CONCNTJ: CPT | Performed by: NURSE PRACTITIONER

## 2022-01-03 PROCEDURE — 85610 PROTHROMBIN TIME: CPT | Performed by: NURSE PRACTITIONER

## 2022-01-03 PROCEDURE — 85730 THROMBOPLASTIN TIME PARTIAL: CPT | Performed by: STUDENT IN AN ORGANIZED HEALTH CARE EDUCATION/TRAINING PROGRAM

## 2022-01-03 PROCEDURE — 36415 COLL VENOUS BLD VENIPUNCTURE: CPT | Performed by: INTERNAL MEDICINE

## 2022-01-03 PROCEDURE — 83615 LACTATE (LD) (LDH) ENZYME: CPT | Performed by: NURSE PRACTITIONER

## 2022-01-03 PROCEDURE — 84100 ASSAY OF PHOSPHORUS: CPT | Performed by: STUDENT IN AN ORGANIZED HEALTH CARE EDUCATION/TRAINING PROGRAM

## 2022-01-03 PROCEDURE — 0W993ZX DRAINAGE OF RIGHT PLEURAL CAVITY, PERCUTANEOUS APPROACH, DIAGNOSTIC: ICD-10-PCS | Performed by: RADIOLOGY

## 2022-01-03 PROCEDURE — 85610 PROTHROMBIN TIME: CPT | Performed by: INTERNAL MEDICINE

## 2022-01-03 PROCEDURE — 84157 ASSAY OF PROTEIN OTHER: CPT | Performed by: NURSE PRACTITIONER

## 2022-01-03 PROCEDURE — 83735 ASSAY OF MAGNESIUM: CPT | Performed by: STUDENT IN AN ORGANIZED HEALTH CARE EDUCATION/TRAINING PROGRAM

## 2022-01-03 PROCEDURE — 82945 GLUCOSE OTHER FLUID: CPT | Performed by: NURSE PRACTITIONER

## 2022-01-03 RX ORDER — HEPARIN SODIUM 10000 [USP'U]/100ML
12 INJECTION, SOLUTION INTRAVENOUS
Status: DISCONTINUED | OUTPATIENT
Start: 2022-01-03 | End: 2022-01-10

## 2022-01-03 RX ORDER — HEPARIN SODIUM 5000 [USP'U]/ML
30-60 INJECTION, SOLUTION INTRAVENOUS; SUBCUTANEOUS EVERY 6 HOURS PRN
Status: DISCONTINUED | OUTPATIENT
Start: 2022-01-03 | End: 2022-01-10

## 2022-01-03 RX ORDER — LIDOCAINE HYDROCHLORIDE 10 MG/ML
10 INJECTION, SOLUTION INFILTRATION; PERINEURAL ONCE
Status: COMPLETED | OUTPATIENT
Start: 2022-01-03 | End: 2022-01-03

## 2022-01-03 RX ADMIN — LIDOCAINE HYDROCHLORIDE 10 ML: 10 INJECTION, SOLUTION INFILTRATION; PERINEURAL at 11:55

## 2022-01-03 RX ADMIN — ASPIRIN 81 MG: 81 TABLET, CHEWABLE ORAL at 09:26

## 2022-01-03 RX ADMIN — ATORVASTATIN CALCIUM 10 MG: 20 TABLET, FILM COATED ORAL at 09:26

## 2022-01-03 RX ADMIN — METOPROLOL SUCCINATE 100 MG: 100 TABLET, EXTENDED RELEASE ORAL at 09:26

## 2022-01-03 RX ADMIN — HEPARIN SODIUM 12 UNITS/KG/HR: 10000 INJECTION, SOLUTION INTRAVENOUS at 16:51

## 2022-01-03 RX ADMIN — BENZONATATE 100 MG: 100 CAPSULE ORAL at 09:26

## 2022-01-03 NOTE — PROGRESS NOTES
Patient's INR 1.58 this AM. Thoracentesis ordered. Patient off floor in radiology during rounds. 500cc bloody fluid removed from his chest. We will check an x-ray in the AM and initiate heparin gtt due to patient's mechanical valve.

## 2022-01-03 NOTE — PLAN OF CARE
Goal Outcome Evaluation:  Plan of Care Reviewed With: patient        Progress: improving  Outcome Summary: H&H q8H, last lab around midnight, hgb 8.8, recheck around 0800 today. No c/o pain or any discomfort. INR and other labs in AM, waiting on INR below 2 in order to get thoracentisis. VSS, will continue to monitor.

## 2022-01-03 NOTE — PLAN OF CARE
Problem: Adult Inpatient Plan of Care  Goal: Plan of Care Review  Outcome: Ongoing, Progressing  Flowsheets (Taken 1/3/2022 1850)  Progress: improving  Plan of Care Reviewed With: patient  Outcome Summary: Vitals stable. Thoracentesis done today - 500ml of blood fluid removed. Started on Heparin drip after procedure- PTT recheck at 2250. +ambulation. Will continue to monitor.

## 2022-01-03 NOTE — PROGRESS NOTES
Claudio Krishna   66 y.o.  male    LOS: 1 day   Patient Care Team:  Shayan Celeste MD as PCP - General (Family Medicine)      Subjective     Review of Systems:   The following systems were reviewed and negative;  respiratory and cardiovascular    Medication Review:   Current Facility-Administered Medications:   •  acetaminophen (TYLENOL) tablet 650 mg, 650 mg, Oral, Q4H PRN, Falguni Renee MD  •  aspirin chewable tablet 81 mg, 81 mg, Oral, Daily, Falguni Renee MD, 81 mg at 01/03/22 0926  •  atorvastatin (LIPITOR) tablet 10 mg, 10 mg, Oral, Daily, Falguni Renee MD, 10 mg at 01/03/22 0926  •  benzonatate (TESSALON) capsule 100 mg, 100 mg, Oral, Daily, Falguni Renee MD, 100 mg at 01/03/22 0926  •  Hold medication, 1 each, Does not apply, Continuous PRN, Rafaela Stout, DNP, APRN  •  melatonin tablet 3 mg, 3 mg, Oral, Nightly PRN, Falguni Renee MD  •  metoprolol succinate XL (TOPROL-XL) 24 hr tablet 100 mg, 100 mg, Oral, Daily, Falguni Renee MD, 100 mg at 01/03/22 0926  •  nitroglycerin (NITROSTAT) SL tablet 0.4 mg, 0.4 mg, Sublingual, Q5 Min PRN, Falguni Renee MD  •  ondansetron (ZOFRAN) tablet 4 mg, 4 mg, Oral, Q6H PRN **OR** ondansetron (ZOFRAN) injection 4 mg, 4 mg, Intravenous, Q6H PRN, Falguni Renee MD  •  [COMPLETED] Insert peripheral IV, , , Once **AND** sodium chloride 0.9 % flush 10 mL, 10 mL, Intravenous, PRN, Dylan Barnhart MD      Objective     Vital Sign Min/Max for last 24 hours  Temp  Min: 98.1 °F (36.7 °C)  Max: 98.8 °F (37.1 °C)   BP  Min: 118/68  Max: 130/72    Pulse  Min: 60  Max: 63         01/01/22  1623 01/03/22  0515   Weight: 102 kg (225 lb) 103 kg (228 lb 1.6 oz)        Intake/Output Summary (Last 24 hours) at 1/3/2022 0959  Last data filed at 1/3/2022 0700  Gross per 24 hour   Intake 840 ml   Output --   Net 840 ml     Physical Exam:      General Appearance:    Well developed and well nourished in no acute  distress   Neck:    no JVD   Lungs:     Clear to auscultation bilaterally. No wheezes, rhonchi or rales. No accessory muscle use.     Heart:    Regular rate and rhythm.  Normal prosthetic valve sounds with grade 2/6 aortic outflow murmur, regular rhythm no gallop, rub or lift.    Abdomen:     Normal bowel sounds, soft non-tender, non-distended   Extremities:   No edema in BLE. BLE warm with no cyanosis   Skin:   Warm and dry   Neurologic:   awake alert and oriented x3, speech clear and approp      Monitor: Done.  Results Review:     I reviewed the patient's new clinical results.    Sodium Sodium   Date Value Ref Range Status   01/03/2022 132 (L) 136 - 145 mmol/L Final   01/02/2022 135 (L) 136 - 145 mmol/L Final   01/01/2022 131 (L) 136 - 145 mmol/L Final      Potassium Potassium   Date Value Ref Range Status   01/03/2022 4.6 3.5 - 5.2 mmol/L Final   01/02/2022 4.3 3.5 - 5.2 mmol/L Final     Comment:     Slight hemolysis detected by analyzer. Results may be affected.   01/01/2022 4.3 3.5 - 5.2 mmol/L Final      Chloride Chloride   Date Value Ref Range Status   01/03/2022 98 98 - 107 mmol/L Final   01/02/2022 101 98 - 107 mmol/L Final   01/01/2022 96 (L) 98 - 107 mmol/L Final      Bicarbonate No results found for: PLASMABICARB   BUN BUN   Date Value Ref Range Status   01/03/2022 12 8 - 23 mg/dL Final   01/02/2022 12 8 - 23 mg/dL Final   01/01/2022 13 8 - 23 mg/dL Final      Creatinine Creatinine   Date Value Ref Range Status   01/03/2022 0.72 (L) 0.76 - 1.27 mg/dL Final   01/02/2022 0.73 (L) 0.76 - 1.27 mg/dL Final   01/01/2022 0.76 0.76 - 1.27 mg/dL Final      Calcium Calcium   Date Value Ref Range Status   01/03/2022 8.4 (L) 8.6 - 10.5 mg/dL Final   01/02/2022 8.3 (L) 8.6 - 10.5 mg/dL Final   01/01/2022 8.8 8.6 - 10.5 mg/dL Final      Magnesium Magnesium   Date Value Ref Range Status   01/03/2022 2.2 1.6 - 2.4 mg/dL Final        Results from last 7 days   Lab Units 01/03/22  0539 01/03/22  0020 01/03/22  0020   WBC  10*3/mm3  --   --  11.27*   HEMOGLOBIN g/dL 8.7*   < > 8.8*  8.8*   HEMATOCRIT % 25.8*   < > 26.3*  26.3*   PLATELETS 10*3/mm3  --   --  232    < > = values in this interval not displayed.         No results found for: CHOL  No results found for: HDL  No results found for: LDL  No results found for: TRIG  No components found for: CHOLHDL  [unfilled]  Results from last 7 days   Lab Units 01/03/22  0539 01/02/22  0426 01/01/22  2139   INR  1.58* 2.06* 2.00*         Echo EF Estimated  No results found for: ECHOEFEST      Assessment/Plan   Assessment/ Plan  1. Hemothorax/Abdominal wall hematoma  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 Small right-sided pleural effusion   4. HTN      Plan:  CTS recommending thoracentesis for pleural effusion. AC on hold for procedure. Hgb stable. BP and HR stable. Will need AC resumed given Mech valve.     This note was scribed for Dr Albino Posada M.D.   Reviewed our cardiology group Nurse Practitioner's note.    Pt interviewed and examined.   Findings verified.   Reviewed and agree with corrections or modifications of history, physical, and plans as indicated above:     Hemoglobin stable.  Patient in sinus rhythm.  [employer] mechanical valve with sinus rhythm has much greater flexibility compared with mitral valve.  Will start heparin as soon as possible after thoracentesis, when okay with cardiothoracic surgery.  Discussed with patient and wife.      Ivy Gregory, DWAYNE  01/03/22  09:59 EST      Time: 24    Dictated portions using Dragon dictation software.     During the entire encounter, I was wearing recommended PPE including face mask and eye protection. Hand sanitization was performed prior to entering room and upon exit.

## 2022-01-03 NOTE — PROGRESS NOTES
Name: Claudio Krishna ADMIT: 2022   : 1955  PCP: Shayan Celeste MD    MRN: [id number] LOS: 1 days   AGE/SEX: 66 y.o. male  ROOM: [facility]     Subjective   Subjective   Patient seen this morning.  No acute events overnight, minimal coughing, states much improved.  Has right lower abdomen abdominal pain primarily with cough.  Denies shortness of breath, fevers, chills, nausea, vomiting.  Hemoglobin has been stable.    Review of Systems   As above  Objective   Objective   Vital Signs  Temp:  [98.1 °F (36.7 °C)-98.8 °F (37.1 °C)] 98.5 °F (36.9 °C)  Heart Rate:  [60-63] 60  Resp:  [18] 18  BP: (118-130)/(66-72) 125/67  SpO2:  [93 %-96 %] 94 %  on   ;   Device (Oxygen Therapy): room air  Body mass index is 32.73 kg/m².  Physical Exam    General: Alert and oriented x3, no acute distress  HEENT: Normocephalic, atraumatic  CV: Regular rate and rhythm, mechanical heart sound.No murmurs rubs or gallops  Lungs:decreased breath sounds RLL, Clear to auscultation res of the lung fields. no crackles or wheezes  Abdomen: Significant bruising noted over right lower and upper abdominal wall and right flank.  Nontender to palpation.      Results Review     I reviewed the patient's new clinical results.  Results from last 7 days   Lab Units 22  0539 22  0020 22  1811 22  1204 22  0427 22  0427 22  1446 22  1446   WBC 10*3/mm3  --  11.27*  --   --   --  8.08  --  10.96*   HEMOGLOBIN g/dL 8.7* 8.8*  8.8* 8.3* 8.9*   < > 8.1*   < > 9.0*   PLATELETS 10*3/mm3  --  232  --   --   --  197  --  217    < > = values in this interval not displayed.     Results from last 7 days   Lab Units 22  0539 22  0427 22  1446   SODIUM mmol/L 132* 135* 131*   POTASSIUM mmol/L 4.6 4.3 4.3   CHLORIDE mmol/L 98 101 96*   CO2 mmol/L 28.1 25.8 26.1   BUN mg/dL 12 12 13   CREATININE mg/dL 0.72* 0.73* 0.76   GLUCOSE mg/dL 114* 130* 121*   Estimated Creatinine Clearance: 109.2 mL/min  (A) (by C-G formula based on SCr of 0.72 mg/dL (L)).  Results from last 7 days   Lab Units 01/01/22  1446   ALBUMIN g/dL 3.80   BILIRUBIN mg/dL 0.7   ALK PHOS U/L 58   AST (SGOT) U/L 27   ALT (SGPT) U/L 21     Results from last 7 days   Lab Units 01/03/22  0539 01/02/22  0427 01/01/22  1446   CALCIUM mg/dL 8.4* 8.3* 8.8   ALBUMIN g/dL  --   --  3.80   MAGNESIUM mg/dL 2.2  --   --    PHOSPHORUS mg/dL 3.2  --   --      Results from last 7 days   Lab Units 01/02/22  0427   PROCALCITONIN ng/mL 0.04     COVID19   Date Value Ref Range Status   01/01/2022 Not Detected Not Detected - Ref. Range Final     No results found for: HGBA1C, POCGLU    CT Abdomen Pelvis With Contrast, CT Chest Without Contrast Diagnostic  CT CHEST WO CONTRAST DIAGNOSTIC-, CT ABDOMEN PELVIS W CONTRAST-     Radiation dose reduction techniques were utilized, including automated  exposure control and exposure modulation based on body size.     CLINICAL: Right abdominal wall ecchymosis, cough, patient  anticoagulated. Evaluate for hematoma.     COMPARISON: None.     FINDINGS: Located within the subcutaneous fat of the right abdominal  wall laterally, there is a 25 x 15 x 4 cm curvilinear density with  surrounding subcutaneous fat edema. This is just superficial to the  abdominal wall musculature and consistent with abdominal wall hematoma  and edema.     There is a complex pleural effusion demonstrated at the right lung base  with areas of increased density, likely blood. The appearance consistent  with hemothorax. Fluid tracks into the major fissure. It is small in  size. There is a curvilinear area of consolidation or atelectasis  involving the right lower lobe. No rib fracture. No acute osseous  abnormality seen.     Calcified benign granuloma in the left lower lobe with associated  calcified benign left hilar lymph nodes. The left lung is otherwise  clear.     There is cardiac enlargement no pericardial effusion. Prior aortic valve  repair. Esophagus  is satisfactory in course and caliber. Diameter of the  aorta is within normal limits, there is atherosclerotic plaque  formation. There are median sternotomy wires in position. Nominal  coronary artery calcification.     The liver, gallbladder, pancreas, spleen, adrenal glands and right  kidney are normal. No biliary duct dilatation. Stomach is collapsed,  contour within normal limits.     The left kidney is normal with the exception of a 3.8 cm renal cysts. No  renal calculus or obstructive uropathy on the right or left. The ureters  are normal in course and caliber to the urinary bladder which is typical  in appearance.     Prostate and seminal vesicles unremarkable.     There is diverticulosis of the colon, no diverticulitis. The small bowel  is satisfactory in appearance. Diameter of the aorta is normal. No  abdominal or pelvic adenopathy seen.     There is a periumbilical hernia containing only mesenteric fat.     Findings of this report called to Dr. Barnhart at the time of completion,  4:30 PM.     CONCLUSION:  1. Curvilinear density within the right lateral abdominal wall  consistent with hematoma.  2. Small right-sided pleural effusion with high density areas within,  consistent with hemothorax. There is an area of  atelectasis/consolidation at the right lung base, potential pneumonia.  3. Cardiomegaly.  4. Diverticulosis of the colon.  5. Periumbilical hernia.        This report was finalized on 1/1/2022 5:39 PM by Dr. Sunday Read M.D.       Scheduled Medications  aspirin, 81 mg, Oral, Daily  atorvastatin, 10 mg, Oral, Daily  benzonatate, 100 mg, Oral, Daily  metoprolol succinate XL, 100 mg, Oral, Daily    Infusions  hold, 1 each    Diet  Diet Regular; Cardiac       Assessment/Plan     Active Hospital Problems    Diagnosis  POA   • **Hemothorax [J94.2]  Unknown   • Mechanical heart valve present [Z95.2]  Not Applicable   • Essential hypertension [I10]  Unknown   • Abdominal wall hematoma [S30.1XXA]  Yes       Resolved Hospital Problems   No resolved problems to display.       Large abdominal wall hematoma/hemothorax  -Surgery consulted, no indications for interventions.    -Cardiothoracic surgery consulted plan for thoracentesis once INR below 2.  -Continue to hold warfarin, monitor H&H, transfuse as indicated  -WBC normal, afebrile, procalcitonin normal, will hold antibiotics for now pending thoracentesis.  -INR 1.58 today, possible thoracentesis pending cardiothoracic evaluation.  -Continue to trend H&H.    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    Mild hyponatremia  -Sodium 132  -Monitor daily BMP    · SCDs for DVT prophylaxis.  · Full code.  · Discussed with patient  · Anticipate discharge to be determined; likely home when cleared by consultants.      Taniya Nicholas MD  [facility]  01/03/22  10:07 EST

## 2022-01-03 NOTE — PROGRESS NOTES
Name: Claudio Krishna ADMIT: 2022   : 1955  PCP: Shayan Celeste MD    MRN: [id number] LOS: 0 days   AGE/SEX: 66 y.o. male  ROOM: [facility]     Subjective   Subjective   Patient seen this morning.  No acute events overnight.  States he still coughing, however significantly less.  Denies abdominal pain, fevers, chills, nausea, vomiting.    Review of Systems   As above  Objective   Objective   Vital Signs  Temp:  [98.1 °F (36.7 °C)-98.8 °F (37.1 °C)] 98.8 °F (37.1 °C)  Heart Rate:  [63] 63  Resp:  [18] 18  BP: (125-135)/(66-73) 125/66  SpO2:  [93 %-95 %] 94 %  on   ;   Device (Oxygen Therapy): room air  Body mass index is 32.28 kg/m².  Physical Exam    General: Alert and oriented x3, no acute distress  HEENT: Normocephalic, atraumatic  CV: Regular rate and rhythm, mechanical heart sound.No murmurs rubs or gallops  Lungs: Clear to auscultation bilaterally, no crackles or wheezes  Abdomen: Significant bruising noted over right lower and upper abdominal wall and right flank.  Nontender to palpation.      Results Review     I reviewed the patient's new clinical results.  Results from last 7 days   Lab Units 22  1811 22  1204 22  0427 22  1446   WBC 10*3/mm3  --   --  8.08 10.96*   HEMOGLOBIN g/dL 8.3* 8.9* 8.1* 9.0*   PLATELETS 10*3/mm3  --   --  197 217     Results from last 7 days   Lab Units 22  0427 22  1446   SODIUM mmol/L 135* 131*   POTASSIUM mmol/L 4.3 4.3   CHLORIDE mmol/L 101 96*   CO2 mmol/L 25.8 26.1   BUN mg/dL 12 13   CREATININE mg/dL 0.73* 0.76   GLUCOSE mg/dL 130* 121*   Estimated Creatinine Clearance: 108.7 mL/min (A) (by C-G formula based on SCr of 0.73 mg/dL (L)).  Results from last 7 days   Lab Units 22  1446   ALBUMIN g/dL 3.80   BILIRUBIN mg/dL 0.7   ALK PHOS U/L 58   AST (SGOT) U/L 27   ALT (SGPT) U/L 21     Results from last 7 days   Lab Units 22  0427 22  1446   CALCIUM mg/dL 8.3* 8.8   ALBUMIN g/dL  --  3.80     Results from last  7 days   Lab Units 01/02/22  0427   PROCALCITONIN ng/mL 0.04     COVID19   Date Value Ref Range Status   01/01/2022 Not Detected Not Detected - Ref. Range Final     No results found for: HGBA1C, POCGLU    CT Abdomen Pelvis With Contrast, CT Chest Without Contrast Diagnostic  CT CHEST WO CONTRAST DIAGNOSTIC-, CT ABDOMEN PELVIS W CONTRAST-     Radiation dose reduction techniques were utilized, including automated  exposure control and exposure modulation based on body size.     CLINICAL: Right abdominal wall ecchymosis, cough, patient  anticoagulated. Evaluate for hematoma.     COMPARISON: None.     FINDINGS: Located within the subcutaneous fat of the right abdominal  wall laterally, there is a 25 x 15 x 4 cm curvilinear density with  surrounding subcutaneous fat edema. This is just superficial to the  abdominal wall musculature and consistent with abdominal wall hematoma  and edema.     There is a complex pleural effusion demonstrated at the right lung base  with areas of increased density, likely blood. The appearance consistent  with hemothorax. Fluid tracks into the major fissure. It is small in  size. There is a curvilinear area of consolidation or atelectasis  involving the right lower lobe. No rib fracture. No acute osseous  abnormality seen.     Calcified benign granuloma in the left lower lobe with associated  calcified benign left hilar lymph nodes. The left lung is otherwise  clear.     There is cardiac enlargement no pericardial effusion. Prior aortic valve  repair. Esophagus is satisfactory in course and caliber. Diameter of the  aorta is within normal limits, there is atherosclerotic plaque  formation. There are median sternotomy wires in position. Nominal  coronary artery calcification.     The liver, gallbladder, pancreas, spleen, adrenal glands and right  kidney are normal. No biliary duct dilatation. Stomach is collapsed,  contour within normal limits.     The left kidney is normal with the exception  of a 3.8 cm renal cysts. No  renal calculus or obstructive uropathy on the right or left. The ureters  are normal in course and caliber to the urinary bladder which is typical  in appearance.     Prostate and seminal vesicles unremarkable.     There is diverticulosis of the colon, no diverticulitis. The small bowel  is satisfactory in appearance. Diameter of the aorta is normal. No  abdominal or pelvic adenopathy seen.     There is a periumbilical hernia containing only mesenteric fat.     Findings of this report called to Dr. Barnhart at the time of completion,  4:30 PM.     CONCLUSION:  1. Curvilinear density within the right lateral abdominal wall  consistent with hematoma.  2. Small right-sided pleural effusion with high density areas within,  consistent with hemothorax. There is an area of  atelectasis/consolidation at the right lung base, potential pneumonia.  3. Cardiomegaly.  4. Diverticulosis of the colon.  5. Periumbilical hernia.        This report was finalized on 1/1/2022 5:39 PM by Dr. Sunday Read M.D.       Scheduled Medications  aspirin, 81 mg, Oral, Daily  atorvastatin, 10 mg, Oral, Daily  benzonatate, 100 mg, Oral, Daily  lisinopril, 10 mg, Oral, Daily  metoprolol succinate XL, 100 mg, Oral, Daily    Infusions   Diet  Diet Regular; Cardiac       Assessment/Plan     Active Hospital Problems    Diagnosis  POA   • **Hemothorax [J94.2]  Unknown   • Mechanical heart valve present [Z95.2]  Not Applicable   • Essential hypertension [I10]  Unknown   • Abdominal wall hematoma [S30.1XXA]  Yes      Resolved Hospital Problems   No resolved problems to display.       Large abdominal wall hematoma/hemothorax  -Surgery consulted, no indications for interventions.    -Cardiothoracic surgery consulted plan for thoracentesis once INR below 2.  -Continue to hold warfarin, monitor H&H, transfuse as indicated  -WBC normal, afebrile, procalcitonin normal, will hold antibiotics for now pending  thoracentesis.    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      · SCDs for DVT prophylaxis.  · Full code.  · Discussed with patient  · Anticipate discharge to be determined      Taniya Nicholas MD  [facility]  01/02/22  21:08 EST

## 2022-01-03 NOTE — CASE MANAGEMENT/SOCIAL WORK
Continued Stay Note  [facility]     Patient Name: Claudio Krishna  MRN: [id number]  Today's Date: 1/3/2022    Admit Date: 1/1/2022     Discharge Plan     Row Name 01/03/22 1547       Plan    Plan Return home with spouse. Denies any needs. Transport by private auto. Plan to start heparin drip for mech valve in AM if CXR OK    Patient/Family in Agreement with Plan yes    Plan Comments Thoracentesis done with 500cc bloody fluid removed. No surgical intervention at this time. Plan to start heparin drip for mech valve in AM if CXR OK. Taran Perez RN-BC               Discharge Codes    No documentation.               Expected Discharge Date and Time     Expected Discharge Date Expected Discharge Time    Jan 6, 2022             Taran Perez RN

## 2022-01-04 ENCOUNTER — APPOINTMENT (OUTPATIENT)
Dept: GENERAL RADIOLOGY | Facility: HOSPITAL | Age: 67
End: 2022-01-04

## 2022-01-04 LAB
ANION GAP SERPL CALCULATED.3IONS-SCNC: 7.1 MMOL/L (ref 5–15)
APTT PPP: 80 SECONDS (ref 22.7–35.4)
BASOPHILS # BLD AUTO: 0.04 10*3/MM3 (ref 0–0.2)
BASOPHILS NFR BLD AUTO: 0.4 % (ref 0–1.5)
BUN SERPL-MCNC: 14 MG/DL (ref 8–23)
BUN/CREAT SERPL: 19.2 (ref 7–25)
CALCIUM SPEC-SCNC: 8.5 MG/DL (ref 8.6–10.5)
CHLORIDE SERPL-SCNC: 97 MMOL/L (ref 98–107)
CO2 SERPL-SCNC: 29.9 MMOL/L (ref 22–29)
CREAT SERPL-MCNC: 0.73 MG/DL (ref 0.76–1.27)
DEPRECATED RDW RBC AUTO: 45.1 FL (ref 37–54)
EOSINOPHIL # BLD AUTO: 0.25 10*3/MM3 (ref 0–0.4)
EOSINOPHIL NFR BLD AUTO: 2.7 % (ref 0.3–6.2)
ERYTHROCYTE [DISTWIDTH] IN BLOOD BY AUTOMATED COUNT: 13 % (ref 12.3–15.4)
GFR SERPL CREATININE-BSD FRML MDRD: 107 ML/MIN/1.73
GLUCOSE SERPL-MCNC: 129 MG/DL (ref 65–99)
HCT VFR BLD AUTO: 26.3 % (ref 37.5–51)
HGB BLD-MCNC: 9 G/DL (ref 13–17.7)
IMM GRANULOCYTES # BLD AUTO: 0.06 10*3/MM3 (ref 0–0.05)
IMM GRANULOCYTES NFR BLD AUTO: 0.7 % (ref 0–0.5)
INR PPP: 1.31 (ref 0.9–1.1)
LYMPHOCYTES # BLD AUTO: 1.17 10*3/MM3 (ref 0.7–3.1)
LYMPHOCYTES NFR BLD AUTO: 12.8 % (ref 19.6–45.3)
MCH RBC QN AUTO: 32.7 PG (ref 26.6–33)
MCHC RBC AUTO-ENTMCNC: 34.2 G/DL (ref 31.5–35.7)
MCV RBC AUTO: 95.6 FL (ref 79–97)
MONOCYTES # BLD AUTO: 0.83 10*3/MM3 (ref 0.1–0.9)
MONOCYTES NFR BLD AUTO: 9.1 % (ref 5–12)
NEUTROPHILS NFR BLD AUTO: 6.78 10*3/MM3 (ref 1.7–7)
NEUTROPHILS NFR BLD AUTO: 74.3 % (ref 42.7–76)
NRBC BLD AUTO-RTO: 0 /100 WBC (ref 0–0.2)
PLATELET # BLD AUTO: 268 10*3/MM3 (ref 140–450)
PMV BLD AUTO: 9.4 FL (ref 6–12)
POTASSIUM SERPL-SCNC: 4.6 MMOL/L (ref 3.5–5.2)
PROCALCITONIN SERPL-MCNC: 0.06 NG/ML (ref 0–0.25)
PROTHROMBIN TIME: 16 SECONDS (ref 11.7–14.2)
RBC # BLD AUTO: 2.75 10*6/MM3 (ref 4.14–5.8)
SODIUM SERPL-SCNC: 134 MMOL/L (ref 136–145)
WBC NRBC COR # BLD: 9.13 10*3/MM3 (ref 3.4–10.8)

## 2022-01-04 PROCEDURE — 80048 BASIC METABOLIC PNL TOTAL CA: CPT | Performed by: STUDENT IN AN ORGANIZED HEALTH CARE EDUCATION/TRAINING PROGRAM

## 2022-01-04 PROCEDURE — 25010000002 HEPARIN (PORCINE) 25000-0.45 UT/250ML-% SOLUTION: Performed by: NURSE PRACTITIONER

## 2022-01-04 PROCEDURE — 97162 PT EVAL MOD COMPLEX 30 MIN: CPT

## 2022-01-04 PROCEDURE — 71045 X-RAY EXAM CHEST 1 VIEW: CPT

## 2022-01-04 PROCEDURE — 99232 SBSQ HOSP IP/OBS MODERATE 35: CPT | Performed by: NURSE PRACTITIONER

## 2022-01-04 PROCEDURE — 85025 COMPLETE CBC W/AUTO DIFF WBC: CPT | Performed by: NURSE PRACTITIONER

## 2022-01-04 PROCEDURE — 85730 THROMBOPLASTIN TIME PARTIAL: CPT | Performed by: STUDENT IN AN ORGANIZED HEALTH CARE EDUCATION/TRAINING PROGRAM

## 2022-01-04 PROCEDURE — 25010000002 HEPARIN (PORCINE) PER 1000 UNITS: Performed by: NURSE PRACTITIONER

## 2022-01-04 PROCEDURE — 85610 PROTHROMBIN TIME: CPT | Performed by: INTERNAL MEDICINE

## 2022-01-04 PROCEDURE — 36415 COLL VENOUS BLD VENIPUNCTURE: CPT | Performed by: STUDENT IN AN ORGANIZED HEALTH CARE EDUCATION/TRAINING PROGRAM

## 2022-01-04 RX ORDER — WARFARIN SODIUM 5 MG/1
5 TABLET ORAL
Status: DISCONTINUED | OUTPATIENT
Start: 2022-01-04 | End: 2022-01-05

## 2022-01-04 RX ADMIN — HEPARIN SODIUM 13 UNITS/KG/HR: 10000 INJECTION, SOLUTION INTRAVENOUS at 11:00

## 2022-01-04 RX ADMIN — Medication 3 MG: at 22:53

## 2022-01-04 RX ADMIN — BENZONATATE 100 MG: 100 CAPSULE ORAL at 08:41

## 2022-01-04 RX ADMIN — ASPIRIN 81 MG: 81 TABLET, CHEWABLE ORAL at 08:41

## 2022-01-04 RX ADMIN — WARFARIN SODIUM 5 MG: 5 TABLET ORAL at 18:38

## 2022-01-04 RX ADMIN — Medication 3 MG: at 00:29

## 2022-01-04 RX ADMIN — METOPROLOL SUCCINATE 100 MG: 100 TABLET, EXTENDED RELEASE ORAL at 08:41

## 2022-01-04 RX ADMIN — HEPARIN SODIUM 5000 UNITS: 5000 INJECTION INTRAVENOUS; SUBCUTANEOUS at 00:25

## 2022-01-04 RX ADMIN — ATORVASTATIN CALCIUM 10 MG: 20 TABLET, FILM COATED ORAL at 08:41

## 2022-01-04 NOTE — PROGRESS NOTES
Name: Claudio Krishna ADMIT: 2022   : 1955  PCP: Shayan Celeste MD    MRN: [id number] LOS: 2 days   AGE/SEX: 66 y.o. male  ROOM: [facility]/     Subjective   Subjective   No new complaints or events overnight.  States he feels a little better today and cough has improved.    Review of Systems   Denies chest pain, palpitations, SOA, edema, fever, chills, nausea, vomiting diarrhea.  Objective   Objective   Vital Signs  Temp:  [98 °F (36.7 °C)-98.9 °F (37.2 °C)] 98 °F (36.7 °C)  Heart Rate:  [63-65] 63  Resp:  [16-18] 18  BP: (111-124)/(61-70) 118/70  SpO2:  [93 %-95 %] 95 %  on   ;   Device (Oxygen Therapy): room air  Body mass index is 32.53 kg/m².  Physical Exam  Vitals and nursing note reviewed.   Constitutional:       Appearance: Normal appearance.   HENT:      Head: Normocephalic and atraumatic.   Cardiovascular:      Rate and Rhythm: Normal rate and regular rhythm.      Heart sounds: Murmur heard.       Pulmonary:      Effort: Pulmonary effort is normal.      Breath sounds: Normal breath sounds.      Comments: Diminished breath sounds at the bases  Abdominal:      General: Bowel sounds are normal. There is no distension.      Palpations: Abdomen is soft.      Tenderness: There is no abdominal tenderness.      Comments: Significant bruising to his abdomen, worse on right side and extends to his right flank   Skin:     General: Skin is warm and dry.   Neurological:      Mental Status: He is alert and oriented to person, place, and time. Mental status is at baseline.   Psychiatric:         Mood and Affect: Mood normal.         Behavior: Behavior normal.           Results Review     I reviewed the patient's new clinical results.  Results from last 7 days   Lab Units 22  0619 22  2323 22  1837 22  1600 22  0539 22  0020 22  1204 22  0427   WBC 10*3/mm3 9.13  --   --  11.22*  --  11.27*  --  8.08   HEMOGLOBIN g/dL 9.0* 8.8* 9.0* 9.7*  9.7*   < > 8.8*  8.8*    < > 8.1*   PLATELETS 10*3/mm3 268  --   --  279  --  232  --  197    < > = values in this interval not displayed.     Results from last 7 days   Lab Units 01/04/22  0619 01/03/22  0539 01/02/22 0427 01/01/22  1446   SODIUM mmol/L 134* 132* 135* 131*   POTASSIUM mmol/L 4.6 4.6 4.3 4.3   CHLORIDE mmol/L 97* 98 101 96*   CO2 mmol/L 29.9* 28.1 25.8 26.1   BUN mg/dL 14 12 12 13   CREATININE mg/dL 0.73* 0.72* 0.73* 0.76   GLUCOSE mg/dL 129* 114* 130* 121*   Estimated Creatinine Clearance: 109.2 mL/min (A) (by C-G formula based on SCr of 0.73 mg/dL (L)).  Results from last 7 days   Lab Units 01/01/22  1446   ALBUMIN g/dL 3.80   BILIRUBIN mg/dL 0.7   ALK PHOS U/L 58   AST (SGOT) U/L 27   ALT (SGPT) U/L 21     Results from last 7 days   Lab Units 01/04/22  0619 01/03/22  0539 01/02/22 0427 01/01/22  1446   CALCIUM mg/dL 8.5* 8.4* 8.3* 8.8   ALBUMIN g/dL  --   --   --  3.80   MAGNESIUM mg/dL  --  2.2  --   --    PHOSPHORUS mg/dL  --  3.2  --   --      Results from last 7 days   Lab Units 01/03/22 2323 01/02/22 0427   PROCALCITONIN ng/mL 0.06 0.04     COVID19   Date Value Ref Range Status   01/01/2022 Not Detected Not Detected - Ref. Range Final     No results found for: HGBA1C, POCGLU    XR Chest 1 View  Narrative: Patient: PLACIDO CORADO  Time Out: 07:48  Exam(s): FILM CXR 1 VIEW     EXAM:    XR Chest, 1 View    CLINICAL HISTORY:     Reason for exam: post-thoracentesis, hemothorax.    TECHNIQUE:    Frontal view of the chest.    COMPARISON:  1 1 2022    FINDINGS:    Lungs: Right base atelectasis.  No consolidation.    Pleural space:  Small right pleural effusion.  No pneumothorax.    Heart:  Unremarkable.  No cardiomegaly.    Mediastinum: Median sternotomy wires in place.    Bones joints:  Unremarkable.    IMPRESSION:       Small right pleural effusion and right base atelectasis.  No pneumothorax.  Impression: Electronically signed by Chriss Leonardo M.D. on 01-04-22 at 0748    Scheduled Medications  aspirin, 81 mg, Oral,  Daily  atorvastatin, 10 mg, Oral, Daily  benzonatate, 100 mg, Oral, Daily  metoprolol succinate XL, 100 mg, Oral, Daily    Infusions  heparin, 12 Units/kg/hr, Last Rate: 13 Units/kg/hr (01/04/22 1100)  hold, 1 each    Diet  Diet Regular; Cardiac       Assessment/Plan     Active Hospital Problems    Diagnosis  POA   • **Hemothorax [J94.2]  Unknown   • Mechanical heart valve present [Z95.2]  Not Applicable   • Essential hypertension [I10]  Unknown   • Abdominal wall hematoma [S30.1XXA]  Yes      Resolved Hospital Problems   No resolved problems to display.       Large abdominal wall hematoma/hemothorax  -Surgery consulted, no indications for interventions.    -Thoracic surgery following, patient went for thoracentesis yesterday with 500 mL fluid off.  Fluid cultures negative at this time.  -WBC normal, afebrile, procalcitonin normal, will hold antibiotics for now pending thoracentesis.  -INR 1.3  -Continue to trend H&H and transfuse as needed.  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  Has been held for thoracentesis.  Can likely resume tonight if okay with all.  Continue heparin drip for now.    Essential hypertension  -Lisinopril being held, continue metoprolol. blood pressure stable.    Mild hyponatremia  -much improved today- Sodium 134  -Monitor daily BMP    · heparin gtt for DVT prophylaxis.  · Full code.  · Discussed with patient  · Anticipate discharge to be determined; likely home when cleared by consultants.      DWAYNE Marte  [facility]  01/04/22  11:23 EST

## 2022-01-04 NOTE — PLAN OF CARE
Goal Outcome Evaluation:              Outcome Summary: (P) Patient is a 66 year old male admitted with a abdominal wall hematoma and hemothorax. Patient tolerated the evaluation well. Pt educated on using splint pillow to help decrease pain when coughing. Pt. denied any barriers that prevent him from returning to home safely. Pt's functional mobility is WNL and he is not appropiately for PT services acutely at this time. Will sign off and encourage patient to continue to ambulate for the remaining of his hospital stay.

## 2022-01-04 NOTE — PROGRESS NOTES
"    Chief Complaint: Pleural effusion, right flank hematoma, follow-up  S/P: Thoracentesis  POD # 1    Subjective:  Symptoms:  Stable.  No shortness of breath.    Diet:  Adequate intake.  No nausea or vomiting.    Activity level: Returning to normal.    Pain:  He complains of pain that is mild.  He reports pain is improving.        Vital Signs:  Temp:  [98 °F (36.7 °C)-98.9 °F (37.2 °C)] 98.5 °F (36.9 °C)  Heart Rate:  [62-63] 62  Resp:  [16-18] 18  BP: (115-125)/(61-70) 125/65    Intake & Output (last day)       01/03 0701  01/04 0700 01/04 0701  01/05 0700    P.O. 960 300    Total Intake(mL/kg) 960 (9.3) 300 (2.9)    Other 500     Total Output 500     Net +460 +300                Objective:  General Appearance:  Comfortable and in no acute distress.    Vital signs: (most recent): Blood pressure 125/65, pulse 62, temperature 98.5 °F (36.9 °C), temperature source Oral, resp. rate 18, height 177.8 cm (70\"), weight 103 kg (226 lb 11.2 oz), SpO2 94 %.  Vital signs are normal.  No fever.    HEENT: Normal HEENT exam.    Lungs:  Normal effort and normal respiratory rate.  He is not in respiratory distress.  There are decreased breath sounds (Right lower lung fields).    Heart: Normal rate.  Regular rhythm.    Chest: Chest wall tenderness present.  (Right lateral chest wall ecchymosis)  Abdomen: (Right upper quadrant, flank hematoma/ecchymosis).    Extremities: Normal range of motion.    Pulses: Distal pulses are intact.    Neurological: Patient is alert.    Skin:  Warm and dry.            Right thoracentesis: 500 cc bloody fluid    Results Review:     I reviewed the patient's new clinical results.  I reviewed the patient's new imaging results and agree with the interpretation.  I reviewed the patient's other test results and agree with the interpretation  Discussed with patient, RN and Dr. Crowe.    Imaging Results (Last 24 Hours)     Procedure Component Value Units Date/Time    XR Chest 1 View [[id number]] Collected: " 01/04/22 0749     Updated: 01/04/22 0749    Narrative:        Patient: PLACIDO CORADO  Time Out: 07:48  Exam(s): FILM CXR 1 VIEW     EXAM:    XR Chest, 1 View    CLINICAL HISTORY:     Reason for exam: post-thoracentesis, hemothorax.    TECHNIQUE:    Frontal view of the chest.    COMPARISON:  1 1 2022    FINDINGS:    Lungs: Right base atelectasis.  No consolidation.    Pleural space:  Small right pleural effusion.  No pneumothorax.    Heart:  Unremarkable.  No cardiomegaly.    Mediastinum: Median sternotomy wires in place.    Bones joints:  Unremarkable.    IMPRESSION:       Small right pleural effusion and right base atelectasis.  No pneumothorax.      Impression:          Electronically signed by Chriss Leonardo M.D. on 01-04-22 at 0748          Lab Results:     Lab Results (last 24 hours)     Procedure Component Value Units Date/Time    Body Fluid Culture - Body Fluid, Pleural Cavity [[id number]] Collected: 01/03/22 1148    Specimen: Body Fluid from Pleural Cavity Updated: 01/04/22 1204     Body Fluid Culture No growth     Gram Stain Rare (1+) WBCs seen      No organisms seen    Basic Metabolic Panel [[id number]]  (Abnormal) Collected: 01/04/22 0619    Specimen: Blood Updated: 01/04/22 0738     Glucose 129 mg/dL      BUN 14 mg/dL      Creatinine 0.73 mg/dL      Sodium 134 mmol/L      Potassium 4.6 mmol/L      Chloride 97 mmol/L      CO2 29.9 mmol/L      Calcium 8.5 mg/dL      eGFR Non African Amer 107 mL/min/1.73      BUN/Creatinine Ratio 19.2     Anion Gap 7.1 mmol/L     Narrative:      GFR Normal >60  Chronic Kidney Disease <60  Kidney Failure <15      CBC & Differential [[id number]]  (Abnormal) Collected: 01/04/22 0619    Specimen: Blood Updated: 01/04/22 0654    Narrative:      The following orders were created for panel order CBC & Differential.  Procedure                               Abnormality         Status                     ---------                               -----------         ------                      CBC Auto Differential[[id number]]        Abnormal            Final result                 Please view results for these tests on the individual orders.    CBC Auto Differential [[id number]]  (Abnormal) Collected: 01/04/22 0619    Specimen: Blood Updated: 01/04/22 0654     WBC 9.13 10*3/mm3      RBC 2.75 10*6/mm3      Hemoglobin 9.0 g/dL      Hematocrit 26.3 %      MCV 95.6 fL      MCH 32.7 pg      MCHC 34.2 g/dL      RDW 13.0 %      RDW-SD 45.1 fl      MPV 9.4 fL      Platelets 268 10*3/mm3      Neutrophil % 74.3 %      Lymphocyte % 12.8 %      Monocyte % 9.1 %      Eosinophil % 2.7 %      Basophil % 0.4 %      Immature Grans % 0.7 %      Neutrophils, Absolute 6.78 10*3/mm3      Lymphocytes, Absolute 1.17 10*3/mm3      Monocytes, Absolute 0.83 10*3/mm3      Eosinophils, Absolute 0.25 10*3/mm3      Basophils, Absolute 0.04 10*3/mm3      Immature Grans, Absolute 0.06 10*3/mm3      nRBC 0.0 /100 WBC     aPTT [[id number]]  (Abnormal) Collected: 01/04/22 0619    Specimen: Blood Updated: 01/04/22 0651     PTT 80.0 seconds     Protime-INR [[id number]]  (Abnormal) Collected: 01/04/22 0619    Specimen: Blood Updated: 01/04/22 0651     Protime 16.0 Seconds      INR 1.31    Procalcitonin [[id number]]  (Normal) Collected: 01/03/22 2323    Specimen: Blood Updated: 01/04/22 0048     Procalcitonin 0.06 ng/mL     Narrative:      As a Marker for Sepsis (Non-Neonates):     1. <0.5 ng/mL represents a low risk of severe sepsis and/or septic shock.  2. >2 ng/mL represents a high risk of severe sepsis and/or septic shock.    As a Marker for Lower Respiratory Tract Infections that require antibiotic therapy:  PCT on Admission     Antibiotic Therapy             6-12 Hrs later  >0.5                          Strongly Recommended            >0.25 - <0.5             Recommended  0.1 - 0.25                  Discouraged                       Remeasure/reassess PCT  <0.1                         Strongly Discouraged         Remeasure/reassess PCT   "    As 28 day mortality risk marker: \"Change in Procalcitonin Result\" (>80% or <=80%) if Day 0 (or Day 1) and Day 4 values are available. Refer to [email or web address]-[email or web address]/    Change in PCT <=80 %   A decrease of PCT levels below or equal to 80% defines a positive change in PCT test result representing a higher risk for 28-day all-cause mortality of patients diagnosed with severe sepsis or septic shock.    Change in PCT >80 %   A decrease of PCT levels of more than 80% defines a negative change in PCT result representing a lower risk for 28-day all-cause mortality of patients diagnosed with severe sepsis or septic shock.                aPTT [[id number]]  (Abnormal) Collected: 01/03/22 2323    Specimen: Blood Updated: 01/03/22 2354     PTT 56.2 seconds     Hemoglobin & Hematocrit, Blood [[id number]]  (Abnormal) Collected: 01/03/22 2323    Specimen: Blood Updated: 01/03/22 2344     Hemoglobin 8.8 g/dL      Hematocrit 26.1 %     Hemoglobin & Hematocrit, Blood [[id number]]  (Abnormal) Collected: 01/03/22 1837    Specimen: Blood Updated: 01/03/22 1945     Hemoglobin 9.0 g/dL      Hematocrit 27.0 %     aPTT [[id number]]  (Normal) Collected: 01/03/22 1600    Specimen: Blood Updated: 01/03/22 1621     PTT 35.4 seconds     Protime-INR [[id number]]  (Abnormal) Collected: 01/03/22 1600    Specimen: Blood Updated: 01/03/22 1621     Protime 17.1 Seconds      INR 1.41    Hemoglobin & Hematocrit, Blood [[id number]]  (Abnormal) Collected: 01/03/22 1600    Specimen: Blood Updated: 01/03/22 1610     Hemoglobin 9.7 g/dL      Hematocrit 28.2 %     CBC & Differential [[id number]]  (Abnormal) Collected: 01/03/22 1600    Specimen: Blood Updated: 01/03/22 1610    Narrative:      The following orders were created for panel order CBC & Differential.  Procedure                               Abnormality         Status                     ---------                               -----------         ------                     CBC Auto " Differential[[id number]]        Abnormal            Final result                 Please view results for these tests on the individual orders.    CBC Auto Differential [[id number]]  (Abnormal) Collected: 01/03/22 1600    Specimen: Blood Updated: 01/03/22 1610     WBC 11.22 10*3/mm3      RBC 2.92 10*6/mm3      Hemoglobin 9.7 g/dL      Hematocrit 28.2 %      MCV 96.6 fL      MCH 33.2 pg      MCHC 34.4 g/dL      RDW 13.0 %      RDW-SD 45.5 fl      MPV 9.2 fL      Platelets 279 10*3/mm3      Neutrophil % 76.5 %      Lymphocyte % 11.6 %      Monocyte % 8.8 %      Eosinophil % 2.0 %      Basophil % 0.5 %      Immature Grans % 0.6 %      Neutrophils, Absolute 8.57 10*3/mm3      Lymphocytes, Absolute 1.30 10*3/mm3      Monocytes, Absolute 0.99 10*3/mm3      Eosinophils, Absolute 0.23 10*3/mm3      Basophils, Absolute 0.06 10*3/mm3      Immature Grans, Absolute 0.07 10*3/mm3      nRBC 0.1 /100 WBC     Lactate Dehydrogenase, Body Fluid - Pleural Fluid, Pleural Cavity [[id number]] Collected: 01/03/22 1148    Specimen: Pleural Fluid from Pleural Cavity Updated: 01/03/22 1430     Lactate Dehydrogenase (LD), Fluid >1,066 U/L     Narrative:      No Reference Ranges Established.    Serous fluid LDH greater than 60 percent of the serum LDH or serous fluid LDH two-thirds of the upper limit of normal for serum LDH suggests the fluid is an exudate.     1. Pleural TP/Serum TP >0.5  2. Pleural LD/Serum LD >0.6  3. Pleural LD >2/3 of the upper limit of normal for serum LDH    This test was developed, it performance characteristics determined and judged suitable for clinical purposes by [facility].  It has not been cleared or approved by the FDA.  The laboratory is regulated under CLIA as qualified to perform high-complexity testing.     Protein, Body Fluid - Pleural Fluid, Pleural Cavity [[id number]] Collected: 01/03/22 1148    Specimen: Pleural Fluid from Pleural Cavity Updated: 01/03/22 1418     Protein, Total,  Fluid 4.0 g/dL     Narrative:      No Reference Ranges Established.    A serous fluid total fluid (TP) greater than 50 percent of the serum TP suggests the fluid is an exudate.      1. Pleural TP/Serum TP >0.5  2. Pleural LD/Serum LD >0.6  3. Pleural LD >2/3 of the upper limit of normal for serum LDH    This test was developed, it performance characteristics determined and judged suitable for clinical purposes by [facility].  It has not been cleared or approved by the FDA.  The laboratory is regulated under CLIA as qualified to perform high-complexity testing.     Glucose, Body Fluid - Pleural Fluid, Pleural Cavity [[id number]] Collected: 01/03/22 1148    Specimen: Pleural Fluid from Pleural Cavity Updated: 01/03/22 1418     Glucose, Fluid 105 mg/dL     Narrative:      No Reference Ranges Established.    Serous fluid glucose less than 60 mg/dL or less than 30 mg/dL below serum glucose suggests an infectious or malignant exudate.     This test was developed, it performance characteristics determined and judged suitable for clinical purposes by [facility].  It has not been cleared or approved by the FDA.  The laboratory is regulated under CLIA as qualified to perform high-complexity testing.            Assessment/Plan       Hemothorax    Abdominal wall hematoma    Mechanical heart valve present    Essential hypertension       Assessment & Plan     I reviewed this morning's chest x-ray which demonstrates a small right pleural effusion with associated right basilar atelectasis.  No pneumothorax s/p thoracentesis.    Pleural effusion: 500 cc of fluid drained yesterday during thoracentesis.  Patient does not report significant improvement in shortness of air, however he reports he did not feel short of air prior to the procedure.  His coughing has resolved.  Hemoglobin is stable presently on heparin drip.  Check H&H and chest x-ray in a.m.    Okay to resume warfarin from  our standpoint.    DWAYNE Martin  [facility]  01/04/22  13:39 EST    Patient was seen and assessed while wearing personal protective equipment including facemask, protective eyewear and gloves.  Hand hygiene performed prior to entering the room and upon exiting with doffing of gloves.

## 2022-01-04 NOTE — PLAN OF CARE
Problem: Adult Inpatient Plan of Care  Goal: Plan of Care Review  Outcome: Ongoing, Progressing  Flowsheets (Taken 1/4/2022 9252)  Progress: improving  Plan of Care Reviewed With: patient  Outcome Summary: Vitals stable. heparin drip continued. PTT therapeutic at this time. Restarting coumadin. XRay ordered for the morning. Worked with PT. Will continue to monitor.

## 2022-01-04 NOTE — PROGRESS NOTES
Claudio Tomi   66 y.o.  male    LOS: 2 days   Patient Care Team:  Shayan Celeste MD as PCP - General (Family Medicine)      Subjective     Review of Systems:   The following systems were reviewed and negative;  respiratory and cardiovascular    Medication Review:   Current Facility-Administered Medications:   •  acetaminophen (TYLENOL) tablet 650 mg, 650 mg, Oral, Q4H PRN, Falguni Renee MD  •  aspirin chewable tablet 81 mg, 81 mg, Oral, Daily, Falguni Renee MD, 81 mg at 01/04/22 0841  •  atorvastatin (LIPITOR) tablet 10 mg, 10 mg, Oral, Daily, Falguni Renee MD, 10 mg at 01/04/22 0841  •  benzonatate (TESSALON) capsule 100 mg, 100 mg, Oral, Daily, Falguni Renee MD, 100 mg at 01/04/22 0841  •  heparin (porcine) 5000 UNIT/ML injection 3,100-6,200 Units, 30-60 Units/kg, Intravenous, Q6H PRN, Leyda Rodriguez APRN, 5,000 Units at 01/04/22 0025  •  heparin 60487 units/250 mL (100 units/mL) in 0.45 % NaCl infusion, 12 Units/kg/hr, Intravenous, Titrated, Leyda Rodriguez APRN, Last Rate: 13.39 mL/hr at 01/04/22 0026, 13 Units/kg/hr at 01/04/22 0026  •  Hold medication, 1 each, Does not apply, Continuous PRN, Rafaela Stout, ELI, APRN  •  melatonin tablet 3 mg, 3 mg, Oral, Nightly PRN, Falguni Renee MD, 3 mg at 01/04/22 0029  •  metoprolol succinate XL (TOPROL-XL) 24 hr tablet 100 mg, 100 mg, Oral, Daily, Falguni Renee MD, 100 mg at 01/04/22 0841  •  nitroglycerin (NITROSTAT) SL tablet 0.4 mg, 0.4 mg, Sublingual, Q5 Min PRN, Falguni Renee MD  •  ondansetron (ZOFRAN) tablet 4 mg, 4 mg, Oral, Q6H PRN **OR** ondansetron (ZOFRAN) injection 4 mg, 4 mg, Intravenous, Q6H PRN, Falguni Renee MD  •  [COMPLETED] Insert peripheral IV, , , Once **AND** sodium chloride 0.9 % flush 10 mL, 10 mL, Intravenous, PRN, Dylan Barnhart MD      Objective     Vital Sign Min/Max for last 24 hours  Temp  Min: 98 °F (36.7 °C)  Max: 98.9 °F (37.2 °C)   BP  Min: 111/64   Max: 142/77    Pulse  Min: 63  Max: 66         01/01/22  1623 01/03/22  0515 01/04/22  0555   Weight: 102 kg (225 lb) 103 kg (228 lb 1.6 oz) 103 kg (226 lb 11.2 oz)        Intake/Output Summary (Last 24 hours) at 1/4/2022 1028  Last data filed at 1/4/2022 0841  Gross per 24 hour   Intake 1260 ml   Output 500 ml   Net 760 ml     Physical Exam:      General Appearance:    Well developed and well nourished in no acute distress   Neck:    no JVD   Lungs:     Clear to auscultation bilaterally. No wheezes, rhonchi or rales. No accessory muscle use.     Heart:   Regular rate and rhythm, with normal valve clicks and grade 1/6 aortic murmur.   Abdomen:     Normal bowel sounds, soft non-tender, non-distended   Extremities:   No edema in BLE. BLE warm with no cyanosis   Skin:   Warm and dry   Neurologic:   awake alert and oriented x3, speech clear and approp      Monitor: Reviewed.  Results Review:     I reviewed the patient's new clinical results.    Sodium Sodium   Date Value Ref Range Status   01/04/2022 134 (L) 136 - 145 mmol/L Final   01/03/2022 132 (L) 136 - 145 mmol/L Final   01/02/2022 135 (L) 136 - 145 mmol/L Final   01/01/2022 131 (L) 136 - 145 mmol/L Final      Potassium Potassium   Date Value Ref Range Status   01/04/2022 4.6 3.5 - 5.2 mmol/L Final   01/03/2022 4.6 3.5 - 5.2 mmol/L Final   01/02/2022 4.3 3.5 - 5.2 mmol/L Final     Comment:     Slight hemolysis detected by analyzer. Results may be affected.   01/01/2022 4.3 3.5 - 5.2 mmol/L Final      Chloride Chloride   Date Value Ref Range Status   01/04/2022 97 (L) 98 - 107 mmol/L Final   01/03/2022 98 98 - 107 mmol/L Final   01/02/2022 101 98 - 107 mmol/L Final   01/01/2022 96 (L) 98 - 107 mmol/L Final      Bicarbonate No results found for: PLASMABICARB   BUN BUN   Date Value Ref Range Status   01/04/2022 14 8 - 23 mg/dL Final   01/03/2022 12 8 - 23 mg/dL Final   01/02/2022 12 8 - 23 mg/dL Final   01/01/2022 13 8 - 23 mg/dL Final      Creatinine Creatinine    Date Value Ref Range Status   01/04/2022 0.73 (L) 0.76 - 1.27 mg/dL Final   01/03/2022 0.72 (L) 0.76 - 1.27 mg/dL Final   01/02/2022 0.73 (L) 0.76 - 1.27 mg/dL Final   01/01/2022 0.76 0.76 - 1.27 mg/dL Final      Calcium Calcium   Date Value Ref Range Status   01/04/2022 8.5 (L) 8.6 - 10.5 mg/dL Final   01/03/2022 8.4 (L) 8.6 - 10.5 mg/dL Final   01/02/2022 8.3 (L) 8.6 - 10.5 mg/dL Final   01/01/2022 8.8 8.6 - 10.5 mg/dL Final      Magnesium Magnesium   Date Value Ref Range Status   01/03/2022 2.2 1.6 - 2.4 mg/dL Final        Results from last 7 days   Lab Units 01/04/22  0619   WBC 10*3/mm3 9.13   HEMOGLOBIN g/dL 9.0*   HEMATOCRIT % 26.3*   PLATELETS 10*3/mm3 268       Results from last 7 days   Lab Units 01/04/22  0619 01/03/22  1600 01/03/22  0539   INR  1.31* 1.41* 1.58*       Assessment/Plan   Assessment/ Plan  1. Hemothorax/Abdominal wall hematoma  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 right-sided pleural effusion        -s/p R thoracentesis 1/3/21:500cc of bloody fluid removed  4. HTN    Plan:  INR 1.31 today, now on heparin drip for mech AVR. Hgb stable   HR and BP stable     Note scribed for Dr. Posada he is to fill in ROS, PE and final plan    DWAYNE Rangel  01/04/22  10:28 EST    Rickie Posada M.D.   Reviewed our cardiology group Nurse Practitioner's note.    Pt interviewed and examined.   Findings verified.   Reviewed and agree with corrections or modifications of history, physical, and plans as indicated:     Mechanical [place] AVR stable.  Has been started on heparin.  500 cc spontaneous left thoracentesis drained.  Hemoglobin is stable.  Recheck hemoglobin and chest x-ray in morning.  Warfarin restarted.    Time: 19    Dictated portions using Dragon dictation software.     During the entire encounter, I was wearing recommended PPE including face mask and eye  protection. Hand sanitization was performed prior to entering room and upon exit.

## 2022-01-04 NOTE — PLAN OF CARE
Goal Outcome Evaluation:  Plan of Care Reviewed With: patient        Progress: improving  Outcome Summary: VSS. No c/o pain tonight. Melatonin given PRN for sleep. Heparin drip continued. aPTT recheck for 0630. Will continue to monitor.

## 2022-01-04 NOTE — PROGRESS NOTES
[facility] Clinical Pharmacy Services: Warfarin Dosing/Monitoring Consult    Claudio Krishna is a 66 y.o. male, estimated creatinine clearance is 109.2 mL/min (A) (by C-G formula based on SCr of 0.73 mg/dL (L)). weighing 103 kg (226 lb 11.2 oz).    Results from last 7 days   Lab Units 01/04/22  0619 01/03/22  2323 01/03/22  1837 01/03/22  1600 01/03/22  0539 01/03/22  0020 01/03/22  0020 01/02/22  1204 01/02/22  0427 01/02/22  0426 01/01/22  2139 01/01/22  1446 01/01/22  1446   INR  1.31*  --   --  1.41* 1.58*  --   --   --   --  2.06* 2.00*   < > 2.14*   APTT seconds 80.0* 56.2*  --  35.4  --   --   --   --   --   --   --   --   --    HEMOGLOBIN g/dL 9.0* 8.8* 9.0* 9.7*  9.7* 8.7*   < > 8.8*  8.8*   < > 8.1*  --   --    < > 9.0*   HEMATOCRIT % 26.3* 26.1* 27.0* 28.2*  28.2* 25.8*   < > 26.3*  26.3*   < > 24.1*  --   --    < > 26.6*   PLATELETS 10*3/mm3 268  --   --  279  --   --  232  --  197  --   --   --  217    < > = values in this interval not displayed.     Prior to admission anticoagulation: 5mg daily    Hospital Anticoagulation:  Consulting provider: Yu  Start date: 1/4/22  Indication: Mech valve  Target INR: 2-3   Expected duration: indefinite  Bridge Therapy: yes with unfractionated heparin drip    Date 1/4/22            INR 1.31            Dose 5mg              Potential food or drug interactions: none    Education complete?/Date: No; plan for follow up TBD    Assessment/Plan:  Dose: will give 5mg as ordered by Dr. Nicholas-Warfarin had been held for thoracentesis. Might want to check on target INR-usually Mech valve is 2.5-3.5?  Monitor for any signs or symptoms of bleeding  Follow up daily INRs and dose adjustments    Pharmacy will continue to follow until discharge or discontinuation of warfarin.     Chriss Alexandra, [facility]  Clinical Pharmacist

## 2022-01-04 NOTE — THERAPY EVALUATION
Patient Name: Claudio Krishna  : 1955    MRN: [id number]                              Today's Date: 2022       Admit Date: 2022    Visit Dx:     ICD-10-CM ICD-9-CM   1. Abdominal wall hematoma, initial encounter  S30.1XXA 922.2   2. Hemothorax on right  J94.2 511.89   3. Chronic anticoagulation  Z79.01 V58.61   4. Mechanical heart valve present  Z95.2 V43.3     Patient Active Problem List   Diagnosis   • Abdominal wall hematoma   • Coronary artery disease   • Mechanical heart valve present   • Hemothorax   • Essential hypertension     Past Medical History:   Diagnosis Date   • Coronary artery disease    • Hypertension      Past Surgical History:   Procedure Laterality Date   • AORTA SURGERY  2011   • HERNIA REPAIR     • KNEE MINI REVISION        General Information     Row Name 22 1513          Physical Therapy Time and Intention    Document Type evaluation (P)   -AY     Mode of Treatment physical therapy (P)   -AY     Row Name 22 1513          General Information    Patient Profile Reviewed yes (P)   -AY     Prior Level of Function independent:; gait; transfer; bed mobility (P)   -AY     Barriers to Rehab none identified (P)   -AY     Row Name 22 1513          Living Environment    Lives With spouse (P)   -AY     Row Name 22 1513          Home Main Entrance    Number of Stairs, Main Entrance six (P)   6 steps to enter w/ rail. Pt denied concerns  -AY     Stair Railings, Main Entrance railings safe and in good condition (P)   -AY     Row Name 22 1513          Stairs Within Home, Primary    Number of Stairs, Within Home, Primary none (P)   -AY     Row Name 22 1513          Cognition    Orientation Status (Cognition) oriented x 4 (P)   -AY     Row Name 22 1513          Safety Issues, Functional Mobility    Impairments Affecting Function (Mobility) endurance/activity tolerance (P)   -AY           User Key  (r) = Recorded By, (t) = Taken By, (c) = Cosigned  By    Initials Name Provider Type    Jorden Rogers, PT Student PT Student               Mobility     Row Name 01/04/22 1516          Bed Mobility    Bed Mobility bed mobility (all) activities (P)   -AY     All Activities, Luna (Bed Mobility) independent (P)   -AY     Row Name 01/04/22 1516          Bed-Chair Transfer    Assistive Device (Bed-Chair Transfers) -- (P)   no AD  -AY     Row Name 01/04/22 1516          Sit-Stand Transfer    Sit-Stand Luna (Transfers) supervision (P)   -AY     Assistive Device (Sit-Stand Transfers) -- (P)   no AD  -AY     Row Name 01/04/22 1516          Gait/Stairs (Locomotion)    Luna Level (Gait) supervision (P)   -AY     Assistive Device (Gait) -- (P)   none  -AY     Distance in Feet (Gait) 260' (P)   -AY     Comment (Gait/Stairs) No LOB. No safety or mobility concerns. (P)   -AY           User Key  (r) = Recorded By, (t) = Taken By, (c) = Cosigned By    Initials Name Provider Type    Jorden Rogers, PT Student PT Student               Obj/Interventions     Row Name 01/04/22 1531          Range of Motion Comprehensive    General Range of Motion no range of motion deficits identified (P)   -AY     Row Name 01/04/22 1531          Strength Comprehensive (MMT)    General Manual Muscle Testing (MMT) Assessment no strength deficits identified (P)   -AY     Comment, General Manual Muscle Testing (MMT) Assessment grossly 4/5 (P)   -AY     Row Name 01/04/22 1531          Balance    Balance Assessment standing static balance; standing dynamic balance (P)   -AY     Static Standing Balance WFL (P)   -AY     Dynamic Standing Balance WFL (P)   -AY     Row Name 01/04/22 1531          Sensory Assessment (Somatosensory)    Sensory Assessment (Somatosensory) sensation intact (P)   -AY           User Key  (r) = Recorded By, (t) = Taken By, (c) = Cosigned By    Initials Name Provider Type    Jorden Rogers, PT Student PT Student               Goals/Plan    No documentation.                 Clinical Impression     Row Name 01/04/22 1522          Pain    Additional Documentation Pain Scale: Numbers Pre/Post-Treatment (Group) (P)   Pain only occurs when patient coughs  -AY     Row Name 01/04/22 1522          Pain Scale: Numbers Pre/Post-Treatment    Pain Location - Side Right (P)   -AY     Pain Location - Orientation upper (P)   -AY     Pain Location abdomen (P)   -AY     Pain Intervention(s) Ambulation/increased activity; Rest (P)   -AY     Row Name 01/04/22 1522          Plan of Care Review    Outcome Summary Patient is a 66 year old male admitted with a abdominal wall hematoma and hemothorax. Patient tolerated the evaluation well. Pt educated on using splint pillow to help decrease pain when coughing. Pt. denied any barriers that prevent him from returning to home safely. Pt's functional mobility is WNL and he is not appropiately for PT services acutely at this time. Will sign off and encourage patient to continue to ambulate for the remaining of his hospital stay. (P)   -AY     Row Name 01/04/22 1522          Vital Signs    Pre SpO2 (%) 92 (P)   -AY     O2 Delivery Pre Treatment room air (P)   -AY     Row Name 01/04/22 1522          Positioning and Restraints    Pre-Treatment Position in bed (P)   -AY     Post Treatment Position bed (P)   -AY     In Bed fowlers; call light within reach; exit alarm on; supine (P)   -AY           User Key  (r) = Recorded By, (t) = Taken By, (c) = Cosigned By    Initials Name Provider Type    Jorden Rogers, PT Student PT Student               Outcome Measures     Row Name 01/04/22 1518          How much help from another person do you currently need...    Turning from your back to your side while in flat bed without using bedrails? 4 (P)   -AY     Moving from lying on back to sitting on the side of a flat bed without bedrails? 4 (P)   -AY     Moving to and from a bed to a chair (including a wheelchair)? 4 (P)   -AY     Standing up from a chair using your  arms (e.g., wheelchair, bedside chair)? 4 (P)   -AY     Climbing 3-5 steps with a railing? 3 (P)   -AY     To walk in hospital room? 4 (P)   -AY     AM-PAC 6 Clicks Score (PT) 23 (P)   -AY     Row Name 01/04/22 1518          Functional Assessment    Outcome Measure Options AM-PAC 6 Clicks Basic Mobility (PT) (P)   -AY           User Key  (r) = Recorded By, (t) = Taken By, (c) = Cosigned By    Initials Name Provider Type    Jorden Rogers, PT Student PT Student                               PT Recommendation and Plan     Outcome Summary: (P) Patient is a 66 year old male admitted with a abdominal wall hematoma and hemothorax. Patient tolerated the evaluation well. Pt educated on using splint pillow to help decrease pain when coughing. Pt. denied any barriers that prevent him from returning to home safely. Pt's functional mobility is WNL and he is not appropiately for PT services acutely at this time. Will sign off and encourage patient to continue to ambulate for the remaining of his hospital stay.     Time Calculation:    PT Charges     Row Name 01/04/22 1556             Time Calculation    Start Time 1441 (P)   -AY      Stop Time 1507 (P)   -AY      Time Calculation (min) 26 min (P)   -AY      PT Received On 01/04/22 (P)   -AY            User Key  (r) = Recorded By, (t) = Taken By, (c) = Cosigned By    Initials Name Provider Type    Jorden Rogers PT Student PT Student              Therapy Charges for Today     Code Description Service Date Service Provider Modifiers Qty    [id number]  PT EVAL MOD COMPLEXITY 1 1/4/2022 Jorden Peralta PT Student GP 1          PT G-Codes  Outcome Measure Options: (P) AM-PAC 6 Clicks Basic Mobility (PT)  AM-PAC 6 Clicks Score (PT): (P) 23    KITTY Ding  1/4/2022

## 2022-01-05 ENCOUNTER — APPOINTMENT (OUTPATIENT)
Dept: GENERAL RADIOLOGY | Facility: HOSPITAL | Age: 67
End: 2022-01-05

## 2022-01-05 LAB
ANION GAP SERPL CALCULATED.3IONS-SCNC: 9.2 MMOL/L (ref 5–15)
APTT PPP: 45.4 SECONDS (ref 22.7–35.4)
APTT PPP: 59.9 SECONDS (ref 22.7–35.4)
APTT PPP: 87.1 SECONDS (ref 22.7–35.4)
BASOPHILS # BLD AUTO: 0.04 10*3/MM3 (ref 0–0.2)
BASOPHILS NFR BLD AUTO: 0.4 % (ref 0–1.5)
BUN SERPL-MCNC: 12 MG/DL (ref 8–23)
BUN/CREAT SERPL: 18.8 (ref 7–25)
CALCIUM SPEC-SCNC: 8.3 MG/DL (ref 8.6–10.5)
CHLORIDE SERPL-SCNC: 97 MMOL/L (ref 98–107)
CO2 SERPL-SCNC: 24.8 MMOL/L (ref 22–29)
CREAT SERPL-MCNC: 0.64 MG/DL (ref 0.76–1.27)
DEPRECATED RDW RBC AUTO: 43.3 FL (ref 37–54)
EOSINOPHIL # BLD AUTO: 0.32 10*3/MM3 (ref 0–0.4)
EOSINOPHIL NFR BLD AUTO: 3.4 % (ref 0.3–6.2)
ERYTHROCYTE [DISTWIDTH] IN BLOOD BY AUTOMATED COUNT: 13.1 % (ref 12.3–15.4)
GFR SERPL CREATININE-BSD FRML MDRD: 125 ML/MIN/1.73
GLUCOSE SERPL-MCNC: 118 MG/DL (ref 65–99)
HCT VFR BLD AUTO: 24.6 % (ref 37.5–51)
HGB BLD-MCNC: 8.5 G/DL (ref 13–17.7)
IMM GRANULOCYTES # BLD AUTO: 0.06 10*3/MM3 (ref 0–0.05)
IMM GRANULOCYTES NFR BLD AUTO: 0.6 % (ref 0–0.5)
INR PPP: 1.31 (ref 0.9–1.1)
LYMPHOCYTES # BLD AUTO: 1.21 10*3/MM3 (ref 0.7–3.1)
LYMPHOCYTES NFR BLD AUTO: 13 % (ref 19.6–45.3)
MCH RBC QN AUTO: 32.1 PG (ref 26.6–33)
MCHC RBC AUTO-ENTMCNC: 34.6 G/DL (ref 31.5–35.7)
MCV RBC AUTO: 92.8 FL (ref 79–97)
MONOCYTES # BLD AUTO: 0.79 10*3/MM3 (ref 0.1–0.9)
MONOCYTES NFR BLD AUTO: 8.5 % (ref 5–12)
NEUTROPHILS NFR BLD AUTO: 6.87 10*3/MM3 (ref 1.7–7)
NEUTROPHILS NFR BLD AUTO: 74.1 % (ref 42.7–76)
NRBC BLD AUTO-RTO: 0.1 /100 WBC (ref 0–0.2)
PLATELET # BLD AUTO: 239 10*3/MM3 (ref 140–450)
PMV BLD AUTO: 10.8 FL (ref 6–12)
POTASSIUM SERPL-SCNC: 4.4 MMOL/L (ref 3.5–5.2)
PROCALCITONIN SERPL-MCNC: 0.04 NG/ML (ref 0–0.25)
PROTHROMBIN TIME: 16.1 SECONDS (ref 11.7–14.2)
RBC # BLD AUTO: 2.65 10*6/MM3 (ref 4.14–5.8)
SODIUM SERPL-SCNC: 131 MMOL/L (ref 136–145)
WBC NRBC COR # BLD: 9.29 10*3/MM3 (ref 3.4–10.8)

## 2022-01-05 PROCEDURE — 85730 THROMBOPLASTIN TIME PARTIAL: CPT | Performed by: NURSE PRACTITIONER

## 2022-01-05 PROCEDURE — 25010000002 HEPARIN (PORCINE) PER 1000 UNITS: Performed by: NURSE PRACTITIONER

## 2022-01-05 PROCEDURE — 36415 COLL VENOUS BLD VENIPUNCTURE: CPT | Performed by: NURSE PRACTITIONER

## 2022-01-05 PROCEDURE — 99231 SBSQ HOSP IP/OBS SF/LOW 25: CPT | Performed by: NURSE PRACTITIONER

## 2022-01-05 PROCEDURE — 85730 THROMBOPLASTIN TIME PARTIAL: CPT | Performed by: STUDENT IN AN ORGANIZED HEALTH CARE EDUCATION/TRAINING PROGRAM

## 2022-01-05 PROCEDURE — 85610 PROTHROMBIN TIME: CPT | Performed by: INTERNAL MEDICINE

## 2022-01-05 PROCEDURE — 80048 BASIC METABOLIC PNL TOTAL CA: CPT | Performed by: STUDENT IN AN ORGANIZED HEALTH CARE EDUCATION/TRAINING PROGRAM

## 2022-01-05 PROCEDURE — 84145 PROCALCITONIN (PCT): CPT | Performed by: STUDENT IN AN ORGANIZED HEALTH CARE EDUCATION/TRAINING PROGRAM

## 2022-01-05 PROCEDURE — 25010000002 HEPARIN (PORCINE) 25000-0.45 UT/250ML-% SOLUTION: Performed by: NURSE PRACTITIONER

## 2022-01-05 PROCEDURE — 85025 COMPLETE CBC W/AUTO DIFF WBC: CPT | Performed by: NURSE PRACTITIONER

## 2022-01-05 PROCEDURE — 71045 X-RAY EXAM CHEST 1 VIEW: CPT

## 2022-01-05 RX ORDER — WARFARIN SODIUM 7.5 MG/1
7.5 TABLET ORAL
Status: COMPLETED | OUTPATIENT
Start: 2022-01-05 | End: 2022-01-05

## 2022-01-05 RX ADMIN — METOPROLOL SUCCINATE 100 MG: 100 TABLET, EXTENDED RELEASE ORAL at 09:16

## 2022-01-05 RX ADMIN — WARFARIN 7.5 MG: 7.5 TABLET ORAL at 17:58

## 2022-01-05 RX ADMIN — HEPARIN SODIUM 16 UNITS/KG/HR: 10000 INJECTION, SOLUTION INTRAVENOUS at 06:36

## 2022-01-05 RX ADMIN — HEPARIN SODIUM 16 UNITS/KG/HR: 10000 INJECTION, SOLUTION INTRAVENOUS at 07:57

## 2022-01-05 RX ADMIN — HEPARIN SODIUM 3100 UNITS: 5000 INJECTION INTRAVENOUS; SUBCUTANEOUS at 21:07

## 2022-01-05 RX ADMIN — ATORVASTATIN CALCIUM 10 MG: 20 TABLET, FILM COATED ORAL at 09:16

## 2022-01-05 RX ADMIN — ASPIRIN 81 MG: 81 TABLET, CHEWABLE ORAL at 09:16

## 2022-01-05 RX ADMIN — Medication 3 MG: at 22:10

## 2022-01-05 RX ADMIN — BENZONATATE 100 MG: 100 CAPSULE ORAL at 09:16

## 2022-01-05 RX ADMIN — HEPARIN SODIUM 5000 UNITS: 5000 INJECTION INTRAVENOUS; SUBCUTANEOUS at 06:36

## 2022-01-05 NOTE — CASE MANAGEMENT/SOCIAL WORK
Continued Stay Note  [facility]     Patient Name: Claudio Krishna  MRN: [id number]  Today's Date: 1/5/2022    Admit Date: 1/1/2022     Discharge Plan     Row Name 01/05/22 9686       Plan    Plan Return home with spouse. Denies any needs. Transport by private auto.    Plan Comments On heparin drip for mech valve. Plan to D/C once INR therapeutic (goal 2.5-3.5). Taran Perez RN-BC               Discharge Codes    No documentation.               Expected Discharge Date and Time     Expected Discharge Date Expected Discharge Time    Jan 6, 2022             Taran Perez RN

## 2022-01-05 NOTE — PLAN OF CARE
Goal Outcome Evaluation:              Outcome Summary: VSS. Heparin gtt continued. Rate adjusted. PTT to be rechedcked at 1930. Pt had no complaints. +ambulation. No other complaints. Will continue to monitor.

## 2022-01-05 NOTE — PROGRESS NOTES
Name: Claudio Krishna ADMIT: 2022   : 1955  PCP: Shayan Celeste MD    MRN: [id number] LOS: 3 days   AGE/SEX: 66 y.o. male  ROOM: E4/     Subjective   Subjective   No new complaints or events overnight.  Breathing is fine.  Cough is much improved since admission.    Review of Systems   Denies chest pain, palpitations, SOA, edema, fever, chills, nausea, vomiting diarrhea.  Objective   Objective   Vital Signs  Temp:  [97.7 °F (36.5 °C)-99.1 °F (37.3 °C)] 97.8 °F (36.6 °C)  Heart Rate:  [60-69] 60  Resp:  [16-18] 18  BP: (129-150)/(69-82) 150/82  SpO2:  [93 %-94 %] 94 %  on   ;   Device (Oxygen Therapy):  system  Body mass index is 32.53 kg/m².  Physical Exam  Vitals and nursing note reviewed.   Constitutional:       Appearance: Normal appearance.   HENT:      Head: Normocephalic and atraumatic.   Cardiovascular:      Rate and Rhythm: Normal rate and regular rhythm.      Heart sounds: Murmur heard.       Pulmonary:      Effort: Pulmonary effort is normal.      Breath sounds: Normal breath sounds.      Comments: Diminished breath sounds at the bases  Abdominal:      General: Bowel sounds are normal. There is no distension.      Palpations: Abdomen is soft.      Tenderness: There is no abdominal tenderness.      Comments: Significant bruising to his abdomen, worse on right side and extends to his right flank   Skin:     General: Skin is warm and dry.   Neurological:      Mental Status: He is alert and oriented to person, place, and time. Mental status is at baseline.   Psychiatric:         Mood and Affect: Mood normal.         Behavior: Behavior normal.           Results Review     I reviewed the patient's new clinical results.  Results from last 7 days   Lab Units 22  0439 22  0619 22  2323 22  1837 22  1600 22  1600 22  0539 22  0020   WBC 10*3/mm3 9.29 9.13  --   --   --  11.22*  --  11.27*   HEMOGLOBIN g/dL 8.5* 9.0* 8.8* 9.0*   < > 9.7*   9.7*   < > 8.8*  8.8*   PLATELETS 10*3/mm3 239 268  --   --   --  279  --  232    < > = values in this interval not displayed.     Results from last 7 days   Lab Units 01/05/22 0439 01/04/22 0619 01/03/22 0539 01/02/22 0427   SODIUM mmol/L 131* 134* 132* 135*   POTASSIUM mmol/L 4.4 4.6 4.6 4.3   CHLORIDE mmol/L 97* 97* 98 101   CO2 mmol/L 24.8 29.9* 28.1 25.8   BUN mg/dL 12 14 12 12   CREATININE mg/dL 0.64* 0.73* 0.72* 0.73*   GLUCOSE mg/dL 118* 129* 114* 130*   Estimated Creatinine Clearance: 109.2 mL/min (A) (by C-G formula based on SCr of 0.64 mg/dL (L)).  Results from last 7 days   Lab Units 01/01/22  1446   ALBUMIN g/dL 3.80   BILIRUBIN mg/dL 0.7   ALK PHOS U/L 58   AST (SGOT) U/L 27   ALT (SGPT) U/L 21     Results from last 7 days   Lab Units 01/05/22 0439 01/04/22 0619 01/03/22 0539 01/02/22 0427 01/01/22  1446 01/01/22  1446   CALCIUM mg/dL 8.3* 8.5* 8.4* 8.3*   < > 8.8   ALBUMIN g/dL  --   --   --   --   --  3.80   MAGNESIUM mg/dL  --   --  2.2  --   --   --    PHOSPHORUS mg/dL  --   --  3.2  --   --   --     < > = values in this interval not displayed.     Results from last 7 days   Lab Units 01/05/22 0439 01/03/22 2323 01/02/22 0427   PROCALCITONIN ng/mL 0.04 0.06 0.04     COVID19   Date Value Ref Range Status   01/01/2022 Not Detected Not Detected - Ref. Range Final     No results found for: HGBA1C, POCGLU    XR Chest 1 View  ONE VIEW PORTABLE CHEST     HISTORY: Follow-up of right hemothorax.     FINDINGS: There is a small-to-moderate right pleural effusion with some  adjacent atelectasis and the effusion shows minimal enlargement since  yesterday's exam. There is no pneumothorax following thoracentesis. The  left lung is clear. The heart remains mildly enlarged with sternal wires  from previous cardiac surgery.     This report was finalized on 1/5/2022 9:24 AM by Dr. Ang Cool M.D.       Scheduled Medications  aspirin, 81 mg, Oral, Daily  atorvastatin, 10 mg, Oral, Daily  metoprolol  succinate XL, 100 mg, Oral, Daily  warfarin, 7.5 mg, Oral, Once    Infusions  heparin, 12 Units/kg/hr, Last Rate: 14 Units/kg/hr (01/05/22 1326)  hold, 1 each  Pharmacy to dose warfarin,     Diet  Diet Regular; Cardiac       Assessment/Plan     Active Hospital Problems    Diagnosis  POA   • **Hemothorax [J94.2]  Unknown   • Mechanical heart valve present [Z95.2]  Not Applicable   • Essential hypertension [I10]  Unknown   • Abdominal wall hematoma [S30.1XXA]  Yes      Resolved Hospital Problems   No resolved problems to display.       Large abdominal wall hematoma/hemothorax  -Surgery consulted, no indications for interventions.    -Patient has underwent a thoracentesis with 500 mL off the fluid cultures remain negative.  Thoracic surgery has signed off and recommend incentive spirometry.  -INR 1.3 again today.  Continue Coumadin bridge with goal INR 2.5-3.5.  -Continue to trend H&H and transfuse as needed.  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 blood pressure stable.    Mild hyponatremia  -Monitor daily BMP    · heparin gtt and coumadin for DVT prophylaxis.  · Full code.  · Discussed with patient  · Anticipate discharge to be determined; home when INR therapeutic.      DWAYNE Marte  [facility]  01/05/22  14:30 EST

## 2022-01-05 NOTE — PROGRESS NOTES
Claudio Krishna   66 y.o.  male    LOS: 3 days   Patient Care Team:  Shayan Celeste MD as PCP - General (Family Medicine)      Subjective     Interval History:     Admit Complaint: Hematoma    Review of Systems:   No CP or SOA, no dizziness or nausea    Medication Review:   Current Facility-Administered Medications:   •  acetaminophen (TYLENOL) tablet 650 mg, 650 mg, Oral, Q4H PRN, Falguni Renee MD  •  aspirin chewable tablet 81 mg, 81 mg, Oral, Daily, Falguni Renee MD, 81 mg at 01/04/22 0841  •  atorvastatin (LIPITOR) tablet 10 mg, 10 mg, Oral, Daily, Falguni Renee MD, 10 mg at 01/04/22 0841  •  benzonatate (TESSALON) capsule 100 mg, 100 mg, Oral, Daily, Falguni Renee MD, 100 mg at 01/04/22 0841  •  heparin (porcine) 5000 UNIT/ML injection 3,100-6,200 Units, 30-60 Units/kg, Intravenous, Q6H PRN, Leyda Rodriguez APRN, 5,000 Units at 01/05/22 0636  •  heparin 09447 units/250 mL (100 units/mL) in 0.45 % NaCl infusion, 12 Units/kg/hr, Intravenous, Titrated, Leyda Rodriguez APRN, Last Rate: 16.48 mL/hr at 01/05/22 0757, 16 Units/kg/hr at 01/05/22 0757  •  Hold medication, 1 each, Does not apply, Continuous PRN, Rafaela Stout, ELI, APRN  •  melatonin tablet 3 mg, 3 mg, Oral, Nightly PRN, Falguni Renee MD, 3 mg at 01/04/22 2253  •  metoprolol succinate XL (TOPROL-XL) 24 hr tablet 100 mg, 100 mg, Oral, Daily, Falguni Renee MD, 100 mg at 01/04/22 0841  •  nitroglycerin (NITROSTAT) SL tablet 0.4 mg, 0.4 mg, Sublingual, Q5 Min PRN, Falguni Renee MD  •  ondansetron (ZOFRAN) tablet 4 mg, 4 mg, Oral, Q6H PRN **OR** ondansetron (ZOFRAN) injection 4 mg, 4 mg, Intravenous, Q6H PRN, Falguni Renee MD  •  Pharmacy to dose warfarin, , Does not apply, Continuous PRN, Taniya Nicholas MD  •  [COMPLETED] Insert peripheral IV, , , Once **AND** sodium chloride 0.9 % flush 10 mL, 10 mL, Intravenous, PRN, Dylan Barnhart MD  •  warfarin (COUMADIN) tablet  7.5 mg, 7.5 mg, Oral, Once, Taniya Nicholas MD      Objective     Vital Sign Min/Max for last 24 hours  Temp  Min: 97.7 °F (36.5 °C)  Max: 99.1 °F (37.3 °C)   BP  Min: 125/65  Max: 138/75    Pulse  Min: 60  Max: 69     Wt Readings from Last 3 Encounters:   01/04/22 103 kg (226 lb 11.2 oz)        Intake/Output Summary (Last 24 hours) at 1/5/2022 0900  Last data filed at 1/5/2022 0316  Gross per 24 hour   Intake 320 ml   Output --   Net 320 ml     Physical Exam:      General Appearance:    Well developed and well nourished in no acute distress   Head:    Normocephalic, atraumatic   Eyes:            Conjunctivae normal, non icteric, no xanthelasma   Neck:   no carotid bruit, no JVD   Lungs:     Clear to auscultation bilaterally. No wheezes, rhonchi or rales. No accessory muscle use.     Heart:    Regular rate and rhythm. Click.  Normal S1 and S2. No murmur, no gallop, rub or lift.    Chest Wall:    No abnormalities observed   Abdomen:     Normal bowel sounds, soft        non-tender, non-distended, no guarding   Rectal:     Deferred   Extremities:   No clubbing, cyanosis or edema.     Pulses:   Pulses palpable and equal bilaterally   Skin:   No bleeding, bruising or rash   Neurologic:   awake alert and oriented x3, speech clear and approp, no facial drooping      Monitor:  nsr  Results Review:     I reviewed the patient's new clinical results.    Sodium Sodium   Date Value Ref Range Status   01/05/2022 131 (L) 136 - 145 mmol/L Final   01/04/2022 134 (L) 136 - 145 mmol/L Final   01/03/2022 132 (L) 136 - 145 mmol/L Final      Potassium Potassium   Date Value Ref Range Status   01/05/2022 4.4 3.5 - 5.2 mmol/L Final   01/04/2022 4.6 3.5 - 5.2 mmol/L Final   01/03/2022 4.6 3.5 - 5.2 mmol/L Final      Chloride Chloride   Date Value Ref Range Status   01/05/2022 97 (L) 98 - 107 mmol/L Final   01/04/2022 97 (L) 98 - 107 mmol/L Final   01/03/2022 98 98 - 107 mmol/L Final      Bicarbonate No results found for: PLASMABICARB    BUN BUN   Date Value Ref Range Status   01/05/2022 12 8 - 23 mg/dL Final   01/04/2022 14 8 - 23 mg/dL Final   01/03/2022 12 8 - 23 mg/dL Final      Creatinine Creatinine   Date Value Ref Range Status   01/05/2022 0.64 (L) 0.76 - 1.27 mg/dL Final   01/04/2022 0.73 (L) 0.76 - 1.27 mg/dL Final   01/03/2022 0.72 (L) 0.76 - 1.27 mg/dL Final      Calcium Calcium   Date Value Ref Range Status   01/05/2022 8.3 (L) 8.6 - 10.5 mg/dL Final   01/04/2022 8.5 (L) 8.6 - 10.5 mg/dL Final   01/03/2022 8.4 (L) 8.6 - 10.5 mg/dL Final      Magnesium Magnesium   Date Value Ref Range Status   01/03/2022 2.2 1.6 - 2.4 mg/dL Final        Results from last 7 days   Lab Units 01/05/22  0439   WBC 10*3/mm3 9.29   HEMOGLOBIN g/dL 8.5*   HEMATOCRIT % 24.6*   PLATELETS 10*3/mm3 239     No results found for: TROPONINT  No results found for: CHOL  No results found for: HDL  No results found for: LDL  No results found for: TRIG  No components found for: CHOLHDL  [unfilled]  Results from last 7 days   Lab Units 01/05/22  0439 01/04/22  0619 01/03/22  1600   INR  1.31* 1.31* 1.41*         Echo EF Estimated  No results found for: ECHOEFEST      Assessment/Plan   Assessment/ Plan  1. 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 right-sided pleural effusion        -s/p R thoracentesis 1/3/21:500cc of bloody fluid removed  4. HTN    On Heparin gtt  H/H low but stable  INR goal 2.5-3.5  Will follow with Dr. Leyva after discharge.      Neida Suarez, DWAYNE  01/05/22  09:00 EST      Time: 15 minutes

## 2022-01-05 NOTE — PLAN OF CARE
Goal Outcome Evaluation:  Plan of Care Reviewed With: patient        Progress: improving  Outcome Summary: VSS. Heparin drip infusing. PTT to be rechecked this AM. Will have c-xray again this AM. Slept intermittently tonight. Will continue to monitor.

## 2022-01-05 NOTE — PROGRESS NOTES
[facility] Clinical Pharmacy Services: Warfarin Dosing/Monitoring Consult    Claudio Krishna is a 66 y.o. male, estimated creatinine clearance is 109.2 mL/min (A) (by C-G formula based on SCr of 0.64 mg/dL (L)). weighing 103 kg (226 lb 11.2 oz).    Results from last 7 days   Lab Units 01/05/22  0439 01/04/22  0619 01/03/22  2323 01/03/22  1837 01/03/22  1600 01/03/22  0539 01/03/22  0539 01/03/22  0020 01/03/22  0020 01/02/22  1204 01/02/22  0427 01/02/22  0426 01/01/22  1446   INR  1.31* 1.31*  --   --  1.41*  --  1.58*  --   --   --   --  2.06*  --    APTT seconds 45.4* 80.0* 56.2*  --  35.4  --   --   --   --   --   --   --   --    HEMOGLOBIN g/dL 8.5* 9.0* 8.8* 9.0* 9.7*  9.7*   < > 8.7*   < > 8.8*  8.8*   < > 8.1*  --    < >   HEMATOCRIT % 24.6* 26.3* 26.1* 27.0* 28.2*  28.2*   < > 25.8*   < > 26.3*  26.3*   < > 24.1*  --    < >   PLATELETS 10*3/mm3 239 268  --   --  279  --   --   --  232  --  197  --    < >    < > = values in this interval not displayed.     Prior to admission anticoagulation: 5mg WedSat, 7.5mg all other days. INr goal 2-3 managed by [facility] cardiology outpatient.     Hospital Anticoagulation:  Consulting provider: Yu  Start date: 1/4/22  Indication:  aortic valve  Target INR: 2-3   Expected duration: indefinite  Bridge Therapy: YES  with unfractionated heparin drip    Date 1/4/22 1/5           INR 1.31 1.31           Dose 5mg 7.5 mg             Potential food or drug interactions: none    Education complete?/Date: No; plan for follow up TBD    Assessment/Plan:  Dose: will give 7.5 mg once   Monitor for any signs or symptoms of bleeding  Follow up daily INRs and dose adjustments    Pharmacy will continue to follow until discharge or discontinuation of warfarin.     Tianna Grande [facility]  Clinical Pharmacist

## 2022-01-05 NOTE — PROGRESS NOTES
Clinical Pharmacy Services: Medication History    Claudio Krishna is a 66 y.o. male presenting to [facility] for Chronic anticoagulation [Z79.01]  Hemothorax on right [J94.2]  Mechanical heart valve present [Z95.2]  Abdominal wall hematoma, initial encounter [S30.1XXA]    He  has a past medical history of Coronary artery disease and Hypertension.    Allergies as of 01/01/2022   • (No Known Allergies)       Medication information was obtained from: self  Pharmacy and Phone Number:     Prior to Admission Medications     Prescriptions Last Dose Informant Patient Reported? Taking?    amoxicillin-clavulanate (AUGMENTIN) 875-125 MG per tablet  Self Yes Yes    Take 1 tablet by mouth 2 (Two) Times a Day.    aspirin 81 MG chewable tablet  Self Yes Yes    Chew 81 mg Daily.    azithromycin (Zithromax Z-Ben) 250 MG tablet   Yes Yes    Take 250 mg by mouth Daily.    benzonatate (Tessalon Perles) 100 MG capsule   Yes Yes    Take 100 mg by mouth Daily.    lisinopril (PRINIVIL,ZESTRIL) 10 MG tablet  Self Yes Yes    Take 10 mg by mouth Daily.    lovastatin (MEVACOR) 40 MG tablet  Self Yes Yes    Take 40 mg by mouth Every Night.    metoprolol succinate XL (TOPROL-XL) 100 MG 24 hr tablet  Self Yes Yes    Take 100 mg by mouth Daily.    Misc Natural Products (OSTEO BI-FLEX/5-LOXIN ADVANCED PO)  Self Yes Yes    Take  by mouth.    warfarin (COUMADIN) 5 MG tablet  Self Yes Yes    Take 5 mg by mouth Daily. 5mg WedSat, 7.5mg all other days            Medication notes: Patient interviewed, clarified warfarin schedule that was different than script, and INR goal.     This medication list is complete to the best of my knowledge as of 1/5/2022    Please call if questions.    Kati Grande, PharmD   1/5/2022 08:21 EST

## 2022-01-05 NOTE — SIGNIFICANT NOTE
01/05/22 0941   OTHER   Discipline occupational therapist   Rehab Time/Intention   Session Not Performed other (see comments)  (Pt reports he is up ad scarlett for ADL's and mobility. OT to s/o.)

## 2022-01-05 NOTE — PROGRESS NOTES
"    Chief Complaint: Pleural effusion, right flank hematoma, follow-up  S/P: Right Thoracentesis  POD # 2    Subjective:  Symptoms:  Stable.  He reports cough.  No shortness of breath, chest pain, weakness or chest pressure.    Diet:  Adequate intake.  No nausea or vomiting.    Activity level: Returning to normal.    Pain:  He complains of pain that is mild.  He reports pain is improving.  Pain is well controlled.        Vital Signs:  Temp:  [97.7 °F (36.5 °C)-99.1 °F (37.3 °C)] 98.2 °F (36.8 °C)  Heart Rate:  [60-69] 60  Resp:  [16-18] 18  BP: (125-138)/(65-75) 138/75    Intake & Output (last day)       01/04 0701  01/05 0700 01/05 0701  01/06 0700    P.O. 620 240    Total Intake(mL/kg) 620 (6) 240 (2.3)    Other      Total Output      Net +620 +240          Urine Unmeasured Occurrence 1 x 1 x          Objective:  General Appearance:  Comfortable and in no acute distress.    Vital signs: (most recent): Blood pressure 138/75, pulse 60, temperature 98.2 °F (36.8 °C), temperature source Oral, resp. rate 18, height 177.8 cm (70\"), weight 103 kg (226 lb 11.2 oz), SpO2 93 %.  Vital signs are normal.  No fever.    HEENT: Normal HEENT exam.    Lungs:  Normal effort and normal respiratory rate.  He is not in respiratory distress.  There are decreased breath sounds (Right lower lung fields).  (Intermittent dry cough)  Heart: Normal rate.  Regular rhythm.    Chest: Chest wall tenderness present.  (Right lateral chest wall ecchymosis)  Abdomen: (Right upper quadrant, flank hematoma/ecchymosis).    Extremities: Normal range of motion.    Pulses: Distal pulses are intact.    Neurological: Patient is alert.    Skin:  Warm and dry.          Right thoracentesis on 1/4/2022: 500 cc bloody fluid    Results Review:     I reviewed the patient's new clinical results.  I reviewed the patient's new imaging results and agree with the interpretation.  I reviewed the patient's other test results and agree with the interpretation  Discussed " "with patient, RN and Dr. Crowe.    Imaging Results (Last 24 Hours)     Procedure Component Value Units Date/Time    XR Chest 1 View [[id number]] Collected: 01/05/22 0826     Updated: 01/05/22 0927    Narrative:      ONE VIEW PORTABLE CHEST     HISTORY: Follow-up of right hemothorax.     FINDINGS: There is a small-to-moderate right pleural effusion with some  adjacent atelectasis and the effusion shows minimal enlargement since  yesterday's exam. There is no pneumothorax following thoracentesis. The  left lung is clear. The heart remains mildly enlarged with sternal wires  from previous cardiac surgery.     This report was finalized on 1/5/2022 9:24 AM by Dr. Ang Cool M.D.             Lab Results:     Lab Results (last 24 hours)     Procedure Component Value Units Date/Time    Procalcitonin [[id number]]  (Normal) Collected: 01/05/22 0439    Specimen: Blood Updated: 01/05/22 1007     Procalcitonin 0.04 ng/mL     Narrative:      As a Marker for Sepsis (Non-Neonates):     1. <0.5 ng/mL represents a low risk of severe sepsis and/or septic shock.  2. >2 ng/mL represents a high risk of severe sepsis and/or septic shock.    As a Marker for Lower Respiratory Tract Infections that require antibiotic therapy:  PCT on Admission     Antibiotic Therapy             6-12 Hrs later  >0.5                          Strongly Recommended            >0.25 - <0.5             Recommended  0.1 - 0.25                  Discouraged                       Remeasure/reassess PCT  <0.1                         Strongly Discouraged         Remeasure/reassess PCT      As 28 day mortality risk marker: \"Change in Procalcitonin Result\" (>80% or <=80%) if Day 0 (or Day 1) and Day 4 values are available. Refer to http://www.[employer]-pct-calculator.com/    Change in PCT <=80 %   A decrease of PCT levels below or equal to 80% defines a positive change in PCT test result representing a higher risk for 28-day all-cause mortality of patients diagnosed with " severe sepsis or septic shock.    Change in PCT >80 %   A decrease of PCT levels of more than 80% defines a negative change in PCT result representing a lower risk for 28-day all-cause mortality of patients diagnosed with severe sepsis or septic shock.                Basic Metabolic Panel [[id number]]  (Abnormal) Collected: 01/05/22 0439    Specimen: Blood Updated: 01/05/22 0632     Glucose 118 mg/dL      BUN 12 mg/dL      Creatinine 0.64 mg/dL      Sodium 131 mmol/L      Potassium 4.4 mmol/L      Chloride 97 mmol/L      CO2 24.8 mmol/L      Calcium 8.3 mg/dL      eGFR Non African Amer 125 mL/min/1.73      BUN/Creatinine Ratio 18.8     Anion Gap 9.2 mmol/L     Narrative:      GFR Normal >60  Chronic Kidney Disease <60  Kidney Failure <15      Body Fluid Culture - Body Fluid, Pleural Cavity [[id number]] Collected: 01/03/22 1148    Specimen: Body Fluid from Pleural Cavity Updated: 01/05/22 0628     Body Fluid Culture No growth at 2 days     Gram Stain Rare (1+) WBCs seen      No organisms seen    CBC & Differential [[id number]]  (Abnormal) Collected: 01/05/22 0439    Specimen: Blood Updated: 01/05/22 0614    Narrative:      The following orders were created for panel order CBC & Differential.  Procedure                               Abnormality         Status                     ---------                               -----------         ------                     CBC Auto Differential[[id number]]        Abnormal            Final result                 Please view results for these tests on the individual orders.    CBC Auto Differential [[id number]]  (Abnormal) Collected: 01/05/22 0439    Specimen: Blood Updated: 01/05/22 0614     WBC 9.29 10*3/mm3      RBC 2.65 10*6/mm3      Hemoglobin 8.5 g/dL      Hematocrit 24.6 %      MCV 92.8 fL      MCH 32.1 pg      MCHC 34.6 g/dL      RDW 13.1 %      RDW-SD 43.3 fl      MPV 10.8 fL      Platelets 239 10*3/mm3      Neutrophil % 74.1 %      Lymphocyte % 13.0 %      Monocyte % 8.5 %       Eosinophil % 3.4 %      Basophil % 0.4 %      Immature Grans % 0.6 %      Neutrophils, Absolute 6.87 10*3/mm3      Lymphocytes, Absolute 1.21 10*3/mm3      Monocytes, Absolute 0.79 10*3/mm3      Eosinophils, Absolute 0.32 10*3/mm3      Basophils, Absolute 0.04 10*3/mm3      Immature Grans, Absolute 0.06 10*3/mm3      nRBC 0.1 /100 WBC     aPTT [[id number]]  (Abnormal) Collected: 01/05/22 0439    Specimen: Blood Updated: 01/05/22 0613     PTT 45.4 seconds     Protime-INR [[id number]]  (Abnormal) Collected: 01/05/22 0439    Specimen: Blood Updated: 01/05/22 0609     Protime 16.1 Seconds      INR 1.31           Assessment/Plan       Hemothorax    Abdominal wall hematoma    Mechanical heart valve present    Essential hypertension       Assessment & Plan     I reviewed this morning's chest x-ray which demonstrates stable appearance of small right pleural effusion and associated right basilar atelectasis. No acute findings on the right.      Pleural effusion: 500 cc of fluid drained during right thoracentesis on 1/4/2022. Shortness of breath remains unchanged. Dry cough has nearly resolved.  Recommend to continue incentive spirometry at discharge.  Nothing more to offer from our standpoint.     We will see as needed. Thank you for letting us participate in the care of Mr. Krishna.     Rafaela Stout, ELI, APRN  [facility]  01/05/22  11:42 EST    Patient was seen and assessed while wearing personal protective equipment including facemask, protective eyewear and gloves.  Hand hygiene performed prior to entering the room and upon exiting with doffing of gloves.

## 2022-01-05 NOTE — PROGRESS NOTES
Claudio Krishna   66 y.o.  male    LOS: 3 days   Patient Care Team:  Shayan Celeste MD as PCP - General (Family Medicine)      Subjective     Interval History:     Patient Complaints: No chest pain other than when he coughs.  Cough is much improved.  No significant shortness of air.    Review of Systems:   No subjective fever or chills    Medication Review:   Current Facility-Administered Medications:   •  acetaminophen (TYLENOL) tablet 650 mg, 650 mg, Oral, Q4H PRN, Falguni Renee MD  •  aspirin chewable tablet 81 mg, 81 mg, Oral, Daily, Falguni Renee MD, 81 mg at 01/04/22 0841  •  atorvastatin (LIPITOR) tablet 10 mg, 10 mg, Oral, Daily, Falguni Renee MD, 10 mg at 01/04/22 0841  •  benzonatate (TESSALON) capsule 100 mg, 100 mg, Oral, Daily, Falguni Renee MD, 100 mg at 01/04/22 0841  •  heparin (porcine) 5000 UNIT/ML injection 3,100-6,200 Units, 30-60 Units/kg, Intravenous, Q6H PRN, Leyda Rodriguez APRN, 5,000 Units at 01/05/22 0636  •  heparin 08170 units/250 mL (100 units/mL) in 0.45 % NaCl infusion, 12 Units/kg/hr, Intravenous, Titrated, Leyda Rodriguez APRN, Last Rate: 16.48 mL/hr at 01/05/22 0757, 16 Units/kg/hr at 01/05/22 0757  •  Hold medication, 1 each, Does not apply, Continuous PRN, Rafaela Stout, ELI, APRN  •  melatonin tablet 3 mg, 3 mg, Oral, Nightly PRN, Falguni Renee MD, 3 mg at 01/04/22 9057  •  metoprolol succinate XL (TOPROL-XL) 24 hr tablet 100 mg, 100 mg, Oral, Daily, Falguni Renee MD, 100 mg at 01/04/22 0841  •  nitroglycerin (NITROSTAT) SL tablet 0.4 mg, 0.4 mg, Sublingual, Q5 Min PRN, Falguni Renee MD  •  ondansetron (ZOFRAN) tablet 4 mg, 4 mg, Oral, Q6H PRN **OR** ondansetron (ZOFRAN) injection 4 mg, 4 mg, Intravenous, Q6H PRN, Falguni Renee MD  •  Pharmacy to dose warfarin, , Does not apply, Continuous PRN, Taniya Nicholas MD  •  [COMPLETED] Insert peripheral IV, , , Once **AND** sodium chloride 0.9 % flush 10  mL, 10 mL, Intravenous, PRN, Dylan Barnhart MD  •  warfarin (COUMADIN) tablet 7.5 mg, 7.5 mg, Oral, Once, Taniya Nicholas MD      Objective     Vital Sign Min/Max for last 24 hours  Temp  Min: 97.7 °F (36.5 °C)  Max: 99.1 °F (37.3 °C)   BP  Min: 125/65  Max: 138/75    Pulse  Min: 60  Max: 69     Wt Readings from Last 3 Encounters:   01/04/22 103 kg (226 lb 11.2 oz)        Intake/Output Summary (Last 24 hours) at 1/5/2022 0857  Last data filed at 1/5/2022 0316  Gross per 24 hour   Intake 320 ml   Output --   Net 320 ml     Physical Exam:      General Appearance:    Well developed and well nourished in no acute distress   Head:    Normocephalic, atraumatic   Eyes:            Conjunctivae normal, anicteric, no xanthelasma   Neck:   no carotid bruit, no JVD   Lungs:    Decreased breath sounds right base, left side fairly clear    Heart:    Regular rate and rhythm.  Normal S1, mechanical aortic valve sounds are good.  1/6 to 2/6 systolic murmur, no gallop, rub or lift.    Chest Wall:   Large hematoma around the right side of the abdomen extending into the right back   Abdomen:     Normal bowel sounds, no masses, no organomegaly, soft        nontender, nondistended, no guarding, no rebound                tenderness   Rectal:     Deferred   Extremities:   No clubbing, cyanosis or edema.     Pulses:   Pulses palpable and equal bilaterally   Skin:   No bleeding, bruising or rash   Neurologic:   awake alert and oriented x3, speech clear and approp, no facial drooping      Monitor:  nsr  Results Review:     I reviewed the patient's new clinical results.    Sodium Sodium   Date Value Ref Range Status   01/05/2022 131 (L) 136 - 145 mmol/L Final   01/04/2022 134 (L) 136 - 145 mmol/L Final   01/03/2022 132 (L) 136 - 145 mmol/L Final      Potassium Potassium   Date Value Ref Range Status   01/05/2022 4.4 3.5 - 5.2 mmol/L Final   01/04/2022 4.6 3.5 - 5.2 mmol/L Final   01/03/2022 4.6 3.5 - 5.2 mmol/L Final      Chloride  Chloride   Date Value Ref Range Status   01/05/2022 97 (L) 98 - 107 mmol/L Final   01/04/2022 97 (L) 98 - 107 mmol/L Final   01/03/2022 98 98 - 107 mmol/L Final      Bicarbonate No results found for: PLASMABICARB   BUN BUN   Date Value Ref Range Status   01/05/2022 12 8 - 23 mg/dL Final   01/04/2022 14 8 - 23 mg/dL Final   01/03/2022 12 8 - 23 mg/dL Final      Creatinine Creatinine   Date Value Ref Range Status   01/05/2022 0.64 (L) 0.76 - 1.27 mg/dL Final   01/04/2022 0.73 (L) 0.76 - 1.27 mg/dL Final   01/03/2022 0.72 (L) 0.76 - 1.27 mg/dL Final      Calcium Calcium   Date Value Ref Range Status   01/05/2022 8.3 (L) 8.6 - 10.5 mg/dL Final   01/04/2022 8.5 (L) 8.6 - 10.5 mg/dL Final   01/03/2022 8.4 (L) 8.6 - 10.5 mg/dL Final      Magnesium Magnesium   Date Value Ref Range Status   01/03/2022 2.2 1.6 - 2.4 mg/dL Final        Results from last 7 days   Lab Units 01/05/22  0439   WBC 10*3/mm3 9.29   HEMOGLOBIN g/dL 8.5*   HEMATOCRIT % 24.6*   PLATELETS 10*3/mm3 239     No results found for: TROPONINT  No results found for: CHOL  No results found for: HDL  No results found for: LDL  No results found for: TRIG  No components found for: CHOLHDL    Results from last 7 days   Lab Units 01/05/22  0439 01/04/22  0619 01/03/22  1600   INR  1.31* 1.31* 1.41*         Echo EF Estimated  No results found for: ECHOEFEST       Assessment/ Plan  1. 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 right-sided pleural effusion        -s/p R thoracentesis 1/3/21:500cc of bloody fluid removed  4. HTN  Plan #1 valve sounds good on exam.  Sinus rhythm with rate of 60.  Blood pressure acceptable.  #2 continues on heparin and warfarin restarted.  Hemoglobin hematocrit slightly lower than yesterday.  #3 x-ray reviewed.  Report indicates the effusion may be slightly larger.  Thoracic surgery  following.        Colin Yun MD  01/05/22  08:57 EST      Time: 18 minutes

## 2022-01-06 LAB
ANION GAP SERPL CALCULATED.3IONS-SCNC: 8.1 MMOL/L (ref 5–15)
APTT PPP: 75.1 SECONDS (ref 22.7–35.4)
BASOPHILS # BLD AUTO: 0.05 10*3/MM3 (ref 0–0.2)
BASOPHILS NFR BLD AUTO: 0.5 % (ref 0–1.5)
BUN SERPL-MCNC: 12 MG/DL (ref 8–23)
BUN/CREAT SERPL: 16.7 (ref 7–25)
CALCIUM SPEC-SCNC: 8.8 MG/DL (ref 8.6–10.5)
CHLORIDE SERPL-SCNC: 94 MMOL/L (ref 98–107)
CO2 SERPL-SCNC: 28.9 MMOL/L (ref 22–29)
CREAT SERPL-MCNC: 0.72 MG/DL (ref 0.76–1.27)
DEPRECATED RDW RBC AUTO: 45 FL (ref 37–54)
EOSINOPHIL # BLD AUTO: 0.41 10*3/MM3 (ref 0–0.4)
EOSINOPHIL NFR BLD AUTO: 4.2 % (ref 0.3–6.2)
ERYTHROCYTE [DISTWIDTH] IN BLOOD BY AUTOMATED COUNT: 13.1 % (ref 12.3–15.4)
GFR SERPL CREATININE-BSD FRML MDRD: 109 ML/MIN/1.73
GLUCOSE SERPL-MCNC: 119 MG/DL (ref 65–99)
HCT VFR BLD AUTO: 27 % (ref 37.5–51)
HGB BLD-MCNC: 9 G/DL (ref 13–17.7)
IMM GRANULOCYTES # BLD AUTO: 0.07 10*3/MM3 (ref 0–0.05)
IMM GRANULOCYTES NFR BLD AUTO: 0.7 % (ref 0–0.5)
INR PPP: 1.27 (ref 0.9–1.1)
LYMPHOCYTES # BLD AUTO: 1.25 10*3/MM3 (ref 0.7–3.1)
LYMPHOCYTES NFR BLD AUTO: 12.8 % (ref 19.6–45.3)
MCH RBC QN AUTO: 31.9 PG (ref 26.6–33)
MCHC RBC AUTO-ENTMCNC: 33.3 G/DL (ref 31.5–35.7)
MCV RBC AUTO: 95.7 FL (ref 79–97)
MONOCYTES # BLD AUTO: 0.96 10*3/MM3 (ref 0.1–0.9)
MONOCYTES NFR BLD AUTO: 9.9 % (ref 5–12)
NEUTROPHILS NFR BLD AUTO: 7 10*3/MM3 (ref 1.7–7)
NEUTROPHILS NFR BLD AUTO: 71.9 % (ref 42.7–76)
NRBC BLD AUTO-RTO: 0 /100 WBC (ref 0–0.2)
PLATELET # BLD AUTO: 323 10*3/MM3 (ref 140–450)
PMV BLD AUTO: 9.2 FL (ref 6–12)
POTASSIUM SERPL-SCNC: 4.6 MMOL/L (ref 3.5–5.2)
PROCALCITONIN SERPL-MCNC: 0.06 NG/ML (ref 0–0.25)
PROTHROMBIN TIME: 15.7 SECONDS (ref 11.7–14.2)
RBC # BLD AUTO: 2.82 10*6/MM3 (ref 4.14–5.8)
SODIUM SERPL-SCNC: 131 MMOL/L (ref 136–145)
WBC NRBC COR # BLD: 9.74 10*3/MM3 (ref 3.4–10.8)

## 2022-01-06 PROCEDURE — 85730 THROMBOPLASTIN TIME PARTIAL: CPT | Performed by: STUDENT IN AN ORGANIZED HEALTH CARE EDUCATION/TRAINING PROGRAM

## 2022-01-06 PROCEDURE — 84145 PROCALCITONIN (PCT): CPT | Performed by: STUDENT IN AN ORGANIZED HEALTH CARE EDUCATION/TRAINING PROGRAM

## 2022-01-06 PROCEDURE — 25010000002 HEPARIN (PORCINE) 25000-0.45 UT/250ML-% SOLUTION: Performed by: NURSE PRACTITIONER

## 2022-01-06 PROCEDURE — 36415 COLL VENOUS BLD VENIPUNCTURE: CPT | Performed by: STUDENT IN AN ORGANIZED HEALTH CARE EDUCATION/TRAINING PROGRAM

## 2022-01-06 PROCEDURE — 80048 BASIC METABOLIC PNL TOTAL CA: CPT | Performed by: STUDENT IN AN ORGANIZED HEALTH CARE EDUCATION/TRAINING PROGRAM

## 2022-01-06 PROCEDURE — 85610 PROTHROMBIN TIME: CPT | Performed by: INTERNAL MEDICINE

## 2022-01-06 PROCEDURE — 85025 COMPLETE CBC W/AUTO DIFF WBC: CPT | Performed by: NURSE PRACTITIONER

## 2022-01-06 RX ORDER — BENZONATATE 100 MG/1
200 CAPSULE ORAL 3 TIMES DAILY PRN
Status: DISCONTINUED | OUTPATIENT
Start: 2022-01-06 | End: 2022-01-06

## 2022-01-06 RX ORDER — WARFARIN SODIUM 10 MG/1
10 TABLET ORAL
Status: COMPLETED | OUTPATIENT
Start: 2022-01-06 | End: 2022-01-06

## 2022-01-06 RX ORDER — BENZONATATE 100 MG/1
100 CAPSULE ORAL 3 TIMES DAILY PRN
Status: DISCONTINUED | OUTPATIENT
Start: 2022-01-06 | End: 2022-01-19 | Stop reason: HOSPADM

## 2022-01-06 RX ADMIN — ASPIRIN 81 MG: 81 TABLET, CHEWABLE ORAL at 08:49

## 2022-01-06 RX ADMIN — WARFARIN 10 MG: 10 TABLET ORAL at 18:06

## 2022-01-06 RX ADMIN — BENZONATATE 200 MG: 100 CAPSULE ORAL at 12:42

## 2022-01-06 RX ADMIN — HEPARIN SODIUM 15 UNITS/KG/HR: 10000 INJECTION, SOLUTION INTRAVENOUS at 19:42

## 2022-01-06 RX ADMIN — ATORVASTATIN CALCIUM 10 MG: 20 TABLET, FILM COATED ORAL at 08:49

## 2022-01-06 RX ADMIN — METOPROLOL SUCCINATE 100 MG: 100 TABLET, EXTENDED RELEASE ORAL at 08:49

## 2022-01-06 RX ADMIN — HEPARIN SODIUM 15 UNITS/KG/HR: 10000 INJECTION, SOLUTION INTRAVENOUS at 01:34

## 2022-01-06 NOTE — PLAN OF CARE
Goal Outcome Evaluation:  Plan of Care Reviewed With: patient        Progress: improving  Outcome Summary: VSS. Heparin drip continued. PTT to be rechecked this AM. No c/o pain. Slept well tonight. Will continue to monitor.

## 2022-01-06 NOTE — PLAN OF CARE
Goal Outcome Evaluation:              Outcome Summary: VSS. Heparin gtt continued. PTT therpeutic. Recheck in the am. +ambulation. No other comaplaints. Waiting on goal INR. Will continue to monitor.

## 2022-01-06 NOTE — PROGRESS NOTES
Name: Claudio Krishna ADMIT: 2022   : 1955  PCP: Shayan Celeste MD    MRN: [id number] LOS: 4 days   AGE/SEX: 66 y.o. male  ROOM: [facility]     Subjective   Subjective   No new complaints or events overnight.  Breathing is fine.  Cough is much improved since admission. just waiting on inr to become therapeutic.    Review of Systems   Denies chest pain, palpitations, SOA, edema, fever, chills, nausea, vomiting diarrhea.  Objective   Objective   Vital Signs  Temp:  [97.7 °F (36.5 °C)-99.7 °F (37.6 °C)] 98.3 °F (36.8 °C)  Heart Rate:  [62-68] 67  Resp:  [16-18] 16  BP: (121-136)/(63-73) 123/63  SpO2:  [92 %-95 %] 95 %  on   ;   Device (Oxygen Therapy): room air  Body mass index is 32.5 kg/m².  Physical Exam  Vitals and nursing note reviewed.   Constitutional:       Appearance: Normal appearance.   HENT:      Head: Normocephalic and atraumatic.   Cardiovascular:      Rate and Rhythm: Normal rate and regular rhythm.      Heart sounds: Murmur heard.       Pulmonary:      Effort: Pulmonary effort is normal.      Breath sounds: Normal breath sounds.      Comments: Diminished breath sounds at the bases  Abdominal:      General: Bowel sounds are normal. There is no distension.      Palpations: Abdomen is soft.      Tenderness: There is no abdominal tenderness.      Comments: Significant bruising to his abdomen, worse on right side and extends to his right flank   Skin:     General: Skin is warm and dry.   Neurological:      Mental Status: He is alert and oriented to person, place, and time. Mental status is at baseline.   Psychiatric:         Mood and Affect: Mood normal.         Behavior: Behavior normal.           Results Review     I reviewed the patient's new clinical results.  Results from last 7 days   Lab Units 22  0509 22  0439 22  0619 22  2323 22  1837 22  1600   WBC 10*3/mm3 9.74 9.29 9.13  --   --  11.22*   HEMOGLOBIN g/dL 9.0* 8.5* 9.0* 8.8*   < > 9.7*  9.7*    PLATELETS 10*3/mm3 323 239 268  --   --  279    < > = values in this interval not displayed.     Results from last 7 days   Lab Units 01/06/22  0509 01/05/22  0439 01/04/22 0619 01/03/22  0539   SODIUM mmol/L 131* 131* 134* 132*   POTASSIUM mmol/L 4.6 4.4 4.6 4.6   CHLORIDE mmol/L 94* 97* 97* 98   CO2 mmol/L 28.9 24.8 29.9* 28.1   BUN mg/dL 12 12 14 12   CREATININE mg/dL 0.72* 0.64* 0.73* 0.72*   GLUCOSE mg/dL 119* 118* 129* 114*   Estimated Creatinine Clearance: 109.2 mL/min (A) (by C-G formula based on SCr of 0.72 mg/dL (L)).  Results from last 7 days   Lab Units 01/01/22  1446   ALBUMIN g/dL 3.80   BILIRUBIN mg/dL 0.7   ALK PHOS U/L 58   AST (SGOT) U/L 27   ALT (SGPT) U/L 21     Results from last 7 days   Lab Units 01/06/22  0509 01/05/22  0439 01/04/22  0619 01/03/22  0539 01/02/22  0427 01/01/22  1446   CALCIUM mg/dL 8.8 8.3* 8.5* 8.4*   < > 8.8   ALBUMIN g/dL  --   --   --   --   --  3.80   MAGNESIUM mg/dL  --   --   --  2.2  --   --    PHOSPHORUS mg/dL  --   --   --  3.2  --   --     < > = values in this interval not displayed.     Results from last 7 days   Lab Units 01/06/22  0509 01/05/22  0439 01/03/22  2323 01/02/22  0427   PROCALCITONIN ng/mL 0.06 0.04 0.06 0.04     COVID19   Date Value Ref Range Status   01/01/2022 Not Detected Not Detected - Ref. Range Final     No results found for: HGBA1C, POCGLU    XR Chest 1 View  ONE VIEW PORTABLE CHEST     HISTORY: Follow-up of right hemothorax.     FINDINGS: There is a small-to-moderate right pleural effusion with some  adjacent atelectasis and the effusion shows minimal enlargement since  yesterday's exam. There is no pneumothorax following thoracentesis. The  left lung is clear. The heart remains mildly enlarged with sternal wires  from previous cardiac surgery.     This report was finalized on 1/5/2022 9:24 AM by Dr. Ang Cool M.D.       Scheduled Medications  aspirin, 81 mg, Oral, Daily  atorvastatin, 10 mg, Oral, Daily  metoprolol succinate XL, 100  mg, Oral, Daily  warfarin, 10 mg, Oral, Once    Infusions  heparin, 12 Units/kg/hr, Last Rate: 15 Units/kg/hr (01/06/22 0134)  hold, 1 each  Pharmacy to dose warfarin,     Diet  Diet Regular; Cardiac       Assessment/Plan     Active Hospital Problems    Diagnosis  POA   • **Hemothorax [J94.2]  Unknown   • Mechanical heart valve present [Z95.2]  Not Applicable   • Essential hypertension [I10]  Unknown   • Abdominal wall hematoma [S30.1XXA]  Yes      Resolved Hospital Problems   No resolved problems to display.       Large abdominal wall hematoma/hemothorax  -Surgery consulted, no indications for interventions.    -Patient has underwent a thoracentesis with 500 mL off, fluid cultures remain negative.  Thoracic surgery has signed off and recommend incentive spirometry.  -INR even lower today at 1.27. Continue Coumadin bridge with goal INR 2.5-3.5. pharmacy dosing coumadin, daily inr  -Continue to trend H&H and transfuse as needed.  -PT/OT, encourage ambulation- has been up walking around nurses station.    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 blood pressure stable.    Mild hyponatremia  -Monitor daily BMP    · heparin gtt and coumadin for DVT prophylaxis.  · Full code.  · Discussed with patient  · Anticipate discharge to be determined; home when INR therapeutic.      DWAYNE Marte  [facility]  01/06/22  13:52 EST

## 2022-01-06 NOTE — PROGRESS NOTES
[facility] Clinical Pharmacy Services: Warfarin Dosing/Monitoring Consult    Claudio Krishna is a 66 y.o. male, estimated creatinine clearance is 109.2 mL/min (A) (by C-G formula based on SCr of 0.72 mg/dL (L)). weighing 103 kg (226 lb 8 oz).    Results from last 7 days   Lab Units 01/06/22  0509 01/05/22  1957 01/05/22  1226 01/05/22  0439 01/04/22  0619 01/03/22  2323 01/03/22  2323 01/03/22  1837 01/03/22  1600 01/03/22  1600 01/03/22  0539 01/03/22  0539 01/03/22  0020 01/03/22  0020   INR  1.27*  --   --  1.31* 1.31*  --   --   --   --  1.41*  --  1.58*  --   --    APTT seconds 75.1* 59.9* 87.1* 45.4* 80.0*   < > 56.2*  --    < > 35.4  --   --   --   --    HEMOGLOBIN g/dL 9.0*  --   --  8.5* 9.0*  --  8.8* 9.0*   < > 9.7*  9.7*   < > 8.7*   < > 8.8*  8.8*   HEMATOCRIT % 27.0*  --   --  24.6* 26.3*  --  26.1* 27.0*   < > 28.2*  28.2*   < > 25.8*   < > 26.3*  26.3*   PLATELETS 10*3/mm3 323  --   --  239 268  --   --   --   --  279  --   --   --  232    < > = values in this interval not displayed.     Prior to admission anticoagulation: 5mg WedSat, 7.5mg all other days. INR goal 2-3 managed by [facility] [place] cardiology outpatient.     Hospital Anticoagulation:  Consulting provider: Yu  Start date: 1/4/22  Indication:  aortic valve  Target INR: 2-3   Expected duration: indefinite  Bridge Therapy: YES  with unfractionated heparin drip    Date 1/4/22 1/5 1/6          INR 1.31 1.31 1.27           Dose 5mg 7.5 mg             Potential food or drug interactions:   - Aspirin: May enhance the anticoagulant effect of warfarin.     Education complete?/Date: No; plan for follow up TBD    Assessment/Plan:  Warfarin 10 mg PO x1 dose today.   Monitor for any signs or symptoms of bleeding  Follow up daily INRs and dose adjustments    Pharmacy will continue to follow until discharge or discontinuation of warfarin.     Lobo Tran, PharmD  Clinical Pharmacist

## 2022-01-06 NOTE — PROGRESS NOTES
Claudio Krishna   66 y.o.  male    LOS: 4 days   Patient Care Team:  Shayan Celeste MD as PCP - General (Family Medicine)      Subjective     Interval History:     Patient Complaints: No chest pain.  No significant shortness of air.    Review of Systems:   No subjective fever chills  No obvious new bleeding  Cough continue to improve    Medication Review:   Current Facility-Administered Medications:     acetaminophen (TYLENOL) tablet 650 mg, 650 mg, Oral, Q4H PRN, Falguni Renee MD    aspirin chewable tablet 81 mg, 81 mg, Oral, Daily, Falguni Renee MD, 81 mg at 01/06/22 0849    atorvastatin (LIPITOR) tablet 10 mg, 10 mg, Oral, Daily, Falguni Renee MD, 10 mg at 01/06/22 0849    benzonatate (TESSALON) capsule 200 mg, 200 mg, Oral, TID PRN, Taniya Nicholas MD    heparin (porcine) 5000 UNIT/ML injection 3,100-6,200 Units, 30-60 Units/kg, Intravenous, Q6H PRN, Leyda Rodriguez APRN, 3,100 Units at 01/05/22 2107    heparin 14825 units/250 mL (100 units/mL) in 0.45 % NaCl infusion, 12 Units/kg/hr, Intravenous, Titrated, Leyda Rodriguez APRN, Last Rate: 15.45 mL/hr at 01/06/22 0134, 15 Units/kg/hr at 01/06/22 0134    Hold medication, 1 each, Does not apply, Continuous PRN, Rafaela Stout, ELI, APRN    melatonin tablet 3 mg, 3 mg, Oral, Nightly PRN, Falguni Renee MD, 3 mg at 01/05/22 2210    metoprolol succinate XL (TOPROL-XL) 24 hr tablet 100 mg, 100 mg, Oral, Daily, Falguni Renee MD, 100 mg at 01/06/22 0849    nitroglycerin (NITROSTAT) SL tablet 0.4 mg, 0.4 mg, Sublingual, Q5 Min PRN, Falguni Renee MD    ondansetron (ZOFRAN) tablet 4 mg, 4 mg, Oral, Q6H PRN **OR** ondansetron (ZOFRAN) injection 4 mg, 4 mg, Intravenous, Q6H PRN, Falguni Renee MD    Pharmacy to dose warfarin, , Does not apply, Continuous PRN, Taniya Nicholas MD    [COMPLETED] Insert peripheral IV, , , Once **AND** sodium chloride 0.9 % flush 10 mL, 10 mL, Intravenous, PRN, Obey,  Dylan Snyder MD    warfarin (COUMADIN) tablet 10 mg, 10 mg, Oral, Once, Taniya Nicholas MD      Objective     Vital Sign Min/Max for last 24 hours  Temp  Min: 97.7 °F (36.5 °C)  Max: 99.7 °F (37.6 °C)   BP  Min: 121/67  Max: 150/82    Pulse  Min: 62  Max: 68     Wt Readings from Last 3 Encounters:   01/06/22 103 kg (226 lb 8 oz)        Intake/Output Summary (Last 24 hours) at 1/6/2022 1039  Last data filed at 1/6/2022 0900  Gross per 24 hour   Intake 360 ml   Output --   Net 360 ml     Physical Exam:      General Appearance:   Overweight male in no acute distress   Head:    Normocephalic, atraumatic   Eyes:            Conjunctivae normal, anicteric, no xanthelasma   Neck:   no carotid bruit, no JVD   Lungs:    Decreased breath sounds right base, fairly clear on the left    Heart:    Regular rate and rhythm.  Normal S1, good prosthetic sounds regarding the aortic valve mechanical valve replacement.  1 /6 systolic murmur, no gallop, rub or lift.    Chest Wall:   Large hematoma around the upper abdomen and back area slowly improving   Abdomen:     Normal bowel sounds, no masses, no organomegaly, soft        nontender, nondistended, no guarding, no rebound                tenderness   Rectal:     Deferred   Extremities:   No clubbing, cyanosis or edema.     Pulses:   Pulses palpable and equal bilaterally   Skin:   No bleeding, bruising or rash   Neurologic:   awake alert and oriented x3, speech clear and approp, no facial drooping      Monitor:  nsr  Results Review:     I reviewed the patient's new clinical results.    Sodium Sodium   Date Value Ref Range Status   01/06/2022 131 (L) 136 - 145 mmol/L Final   01/05/2022 131 (L) 136 - 145 mmol/L Final   01/04/2022 134 (L) 136 - 145 mmol/L Final      Potassium Potassium   Date Value Ref Range Status   01/06/2022 4.6 3.5 - 5.2 mmol/L Final   01/05/2022 4.4 3.5 - 5.2 mmol/L Final   01/04/2022 4.6 3.5 - 5.2 mmol/L Final      Chloride Chloride   Date Value Ref Range Status    01/06/2022 94 (L) 98 - 107 mmol/L Final   01/05/2022 97 (L) 98 - 107 mmol/L Final   01/04/2022 97 (L) 98 - 107 mmol/L Final      Bicarbonate No results found for: PLASMABICARB   BUN BUN   Date Value Ref Range Status   01/06/2022 12 8 - 23 mg/dL Final   01/05/2022 12 8 - 23 mg/dL Final   01/04/2022 14 8 - 23 mg/dL Final      Creatinine Creatinine   Date Value Ref Range Status   01/06/2022 0.72 (L) 0.76 - 1.27 mg/dL Final   01/05/2022 0.64 (L) 0.76 - 1.27 mg/dL Final   01/04/2022 0.73 (L) 0.76 - 1.27 mg/dL Final      Calcium Calcium   Date Value Ref Range Status   01/06/2022 8.8 8.6 - 10.5 mg/dL Final   01/05/2022 8.3 (L) 8.6 - 10.5 mg/dL Final   01/04/2022 8.5 (L) 8.6 - 10.5 mg/dL Final      Magnesium No results found for: MG     Results from last 7 days   Lab Units 01/06/22  0509   WBC 10*3/mm3 9.74   HEMOGLOBIN g/dL 9.0*   HEMATOCRIT % 27.0*   PLATELETS 10*3/mm3 323     No results found for: TROPONINT  No results found for: CHOL  No results found for: HDL  No results found for: LDL  No results found for: TRIG  No components found for: CHOLHDL    Results from last 7 days   Lab Units 01/06/22  0509 01/05/22  0439 01/04/22  0619   INR  1.27* 1.31* 1.31*         Echo EF Estimated  No results found for: ECHOEFEST       Assessment/ Plan  1. Hemothorax/Abdominal wall hematoma  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 right-sided pleural effusion        -s/p R thoracentesis 1/3/21:500cc of bloody fluid removed  4. HTN  Plan #1 on exam the prosthetic valve sounds are good.  Remains in sinus rhythm with a good rate.  Blood pressure remains acceptable.  2.  Continues on heparin until INR is in therapeutic range.  Hemoglobin and hematocrit stable today.  Thoracic surgery has signed off.  #3 discussed with the patient and his wife.      Colin Yun MD  01/06/22  10:39 EST      Time: 18  minutes

## 2022-01-07 LAB
ANION GAP SERPL CALCULATED.3IONS-SCNC: 9.2 MMOL/L (ref 5–15)
APTT PPP: 51.1 SECONDS (ref 22.7–35.4)
APTT PPP: 82.1 SECONDS (ref 22.7–35.4)
BASOPHILS # BLD AUTO: 0.07 10*3/MM3 (ref 0–0.2)
BASOPHILS NFR BLD AUTO: 0.8 % (ref 0–1.5)
BUN SERPL-MCNC: 13 MG/DL (ref 8–23)
BUN/CREAT SERPL: 16.3 (ref 7–25)
CALCIUM SPEC-SCNC: 8.9 MG/DL (ref 8.6–10.5)
CHLORIDE SERPL-SCNC: 97 MMOL/L (ref 98–107)
CO2 SERPL-SCNC: 25.8 MMOL/L (ref 22–29)
CREAT SERPL-MCNC: 0.8 MG/DL (ref 0.76–1.27)
DEPRECATED RDW RBC AUTO: 46.7 FL (ref 37–54)
EOSINOPHIL # BLD AUTO: 0.44 10*3/MM3 (ref 0–0.4)
EOSINOPHIL NFR BLD AUTO: 5.1 % (ref 0.3–6.2)
ERYTHROCYTE [DISTWIDTH] IN BLOOD BY AUTOMATED COUNT: 13.4 % (ref 12.3–15.4)
FERRITIN SERPL-MCNC: 576 NG/ML (ref 30–400)
GFR SERPL CREATININE-BSD FRML MDRD: 97 ML/MIN/1.73
GLUCOSE SERPL-MCNC: 106 MG/DL (ref 65–99)
HCT VFR BLD AUTO: 28.3 % (ref 37.5–51)
HGB BLD-MCNC: 9.6 G/DL (ref 13–17.7)
IMM GRANULOCYTES # BLD AUTO: 0.07 10*3/MM3 (ref 0–0.05)
IMM GRANULOCYTES NFR BLD AUTO: 0.8 % (ref 0–0.5)
INR PPP: 1.26 (ref 0.9–1.1)
LYMPHOCYTES # BLD AUTO: 1.18 10*3/MM3 (ref 0.7–3.1)
LYMPHOCYTES NFR BLD AUTO: 13.7 % (ref 19.6–45.3)
MCH RBC QN AUTO: 32.5 PG (ref 26.6–33)
MCHC RBC AUTO-ENTMCNC: 33.9 G/DL (ref 31.5–35.7)
MCV RBC AUTO: 95.9 FL (ref 79–97)
MONOCYTES # BLD AUTO: 0.76 10*3/MM3 (ref 0.1–0.9)
MONOCYTES NFR BLD AUTO: 8.8 % (ref 5–12)
NEUTROPHILS NFR BLD AUTO: 6.12 10*3/MM3 (ref 1.7–7)
NEUTROPHILS NFR BLD AUTO: 70.8 % (ref 42.7–76)
NRBC BLD AUTO-RTO: 0.1 /100 WBC (ref 0–0.2)
PLATELET # BLD AUTO: 366 10*3/MM3 (ref 140–450)
PMV BLD AUTO: 9 FL (ref 6–12)
POTASSIUM SERPL-SCNC: 4.3 MMOL/L (ref 3.5–5.2)
PROTHROMBIN TIME: 15.6 SECONDS (ref 11.7–14.2)
RBC # BLD AUTO: 2.95 10*6/MM3 (ref 4.14–5.8)
SODIUM SERPL-SCNC: 132 MMOL/L (ref 136–145)
WBC NRBC COR # BLD: 8.64 10*3/MM3 (ref 3.4–10.8)

## 2022-01-07 PROCEDURE — 80048 BASIC METABOLIC PNL TOTAL CA: CPT | Performed by: STUDENT IN AN ORGANIZED HEALTH CARE EDUCATION/TRAINING PROGRAM

## 2022-01-07 PROCEDURE — 25010000002 HEPARIN (PORCINE) 25000-0.45 UT/250ML-% SOLUTION: Performed by: NURSE PRACTITIONER

## 2022-01-07 PROCEDURE — 85025 COMPLETE CBC W/AUTO DIFF WBC: CPT | Performed by: NURSE PRACTITIONER

## 2022-01-07 PROCEDURE — 85730 THROMBOPLASTIN TIME PARTIAL: CPT | Performed by: THORACIC SURGERY (CARDIOTHORACIC VASCULAR SURGERY)

## 2022-01-07 PROCEDURE — 82728 ASSAY OF FERRITIN: CPT | Performed by: STUDENT IN AN ORGANIZED HEALTH CARE EDUCATION/TRAINING PROGRAM

## 2022-01-07 PROCEDURE — 36415 COLL VENOUS BLD VENIPUNCTURE: CPT | Performed by: NURSE PRACTITIONER

## 2022-01-07 PROCEDURE — 85730 THROMBOPLASTIN TIME PARTIAL: CPT | Performed by: NURSE PRACTITIONER

## 2022-01-07 PROCEDURE — 85610 PROTHROMBIN TIME: CPT | Performed by: INTERNAL MEDICINE

## 2022-01-07 PROCEDURE — 25010000002 HEPARIN (PORCINE) PER 1000 UNITS: Performed by: NURSE PRACTITIONER

## 2022-01-07 RX ORDER — WARFARIN SODIUM 10 MG/1
10 TABLET ORAL
Status: COMPLETED | OUTPATIENT
Start: 2022-01-07 | End: 2022-01-07

## 2022-01-07 RX ADMIN — HEPARIN SODIUM 18 UNITS/KG/HR: 10000 INJECTION, SOLUTION INTRAVENOUS at 13:24

## 2022-01-07 RX ADMIN — WARFARIN 10 MG: 10 TABLET ORAL at 18:17

## 2022-01-07 RX ADMIN — METOPROLOL SUCCINATE 100 MG: 100 TABLET, EXTENDED RELEASE ORAL at 09:27

## 2022-01-07 RX ADMIN — ATORVASTATIN CALCIUM 10 MG: 20 TABLET, FILM COATED ORAL at 09:27

## 2022-01-07 RX ADMIN — ASPIRIN 81 MG: 81 TABLET, CHEWABLE ORAL at 09:27

## 2022-01-07 RX ADMIN — HEPARIN SODIUM 18 UNITS/KG/HR: 10000 INJECTION, SOLUTION INTRAVENOUS at 09:51

## 2022-01-07 RX ADMIN — BENZONATATE 100 MG: 100 CAPSULE ORAL at 09:33

## 2022-01-07 RX ADMIN — HEPARIN SODIUM 6200 UNITS: 5000 INJECTION INTRAVENOUS; SUBCUTANEOUS at 09:50

## 2022-01-07 NOTE — PLAN OF CARE
Goal Outcome Evaluation:  Plan of Care Reviewed With: patient        Progress: improving  Outcome Summary: Hep gtt maintained, recheck in AM; No c/o pain or discomfort; vss, will continue to monitor.

## 2022-01-07 NOTE — CASE MANAGEMENT/SOCIAL WORK
Continued Stay Note  [facility]     Patient Name: Claudio Krishna  MRN: [id number]  Today's Date: 1/7/2022    Admit Date: 1/1/2022     Discharge Plan     Row Name 01/07/22 1634       Plan    Plan Return home with spouse. Denies any needs. Transport by private auto. On heparin drip for mech valve. Plan to D/C once INR therapeutic (goal 2.5-3.5)    Plan Comments On heparin drip and briding to coumadin. INR 1.26. Discussed poss D/C on Lovenox but pt states him or his wife are able to do injections. Waiting for INR to be therapeutic. Taran Perez RN-BC    Row Name 01/07/22 1416       Plan    Plan Return home with spouse. Denies any needs. Transport by private auto. On heparin drip for mech valve. Plan to D/C once INR therapeutic (goal 2.5-3.5)    Patient/Family in Agreement with Plan yes    Plan Comments On Heparin drip               Discharge Codes    No documentation.               Expected Discharge Date and Time     Expected Discharge Date Expected Discharge Time    Jan 6, 2022             Taran Perez RN

## 2022-01-07 NOTE — PROGRESS NOTES
[facility] Clinical Pharmacy Services: Warfarin Dosing/Monitoring Consult    Claudio Krishna is a 66 y.o. male, estimated creatinine clearance is 109.2 mL/min (A) (by C-G formula based on SCr of 0.72 mg/dL (L)). weighing 103 kg (226 lb 8 oz).    Results from last 7 days   Lab Units 01/07/22  0558 01/06/22  0509 01/05/22  1957 01/05/22  1226 01/05/22  0439 01/04/22  0619 01/04/22  0619 01/03/22  2323 01/03/22  2323 01/03/22  1837 01/03/22  1600   INR  1.26* 1.27*  --   --  1.31*  --  1.31*  --   --   --  1.41*   APTT seconds 51.1* 75.1* 59.9* 87.1* 45.4*   < > 80.0*   < > 56.2*   < > 35.4   HEMOGLOBIN g/dL 9.6* 9.0*  --   --  8.5*  --  9.0*  --  8.8*   < > 9.7*  9.7*   HEMATOCRIT % 28.3* 27.0*  --   --  24.6*  --  26.3*  --  26.1*   < > 28.2*  28.2*   PLATELETS 10*3/mm3 366 323  --   --  239  --  268  --   --   --  279    < > = values in this interval not displayed.     Prior to admission anticoagulation: 5mg WedSat, 7.5mg all other days. INR goal 2-3 managed by [facility] cardiology outpatient.     Hospital Anticoagulation:  Consulting provider: Yu  Start date: 1/4/22  Indication:  aortic valve  Target INR: 2-3   Expected duration: indefinite  Bridge Therapy: YES  with unfractionated heparin drip    Date 1/4/22 1/5 1/6 1.7         INR 1.31 1.31 1.27  1.26         Dose 5mg 7.5 mg 10mg 10mg           Potential food or drug interactions:   - Aspirin: May enhance the anticoagulant effect of warfarin.     Education complete?/Date: No; plan for follow up TBD    Assessment/Plan:  Will give warfarin 10 mg x1 again today.  Anticipate INR to start trending towards goal tomorrow, likely seeing result of warfarin being held 1/1 - 1/3  Monitor for any signs or symptoms of bleeding  Follow up daily INRs and dose adjustments    Pharmacy will continue to follow until discharge or discontinuation of warfarin.     Mela SahuD, BCPS

## 2022-01-07 NOTE — PROGRESS NOTES
Claudio Krishna   66 y.o.  male    LOS: 5 days   Patient Care Team:  Shayan Celeste MD as PCP - General (Family Medicine)      Subjective     Interval History:     Patient Complaints: No chest pain.  No complaints of any shortness of air.  He has been sitting in the chair and ambulating well without issues.  No palpitations or lightheadedness.    Review of Systems:   No subjective fever chills  No new bleeding issues noted    Medication Review:   Current Facility-Administered Medications:   •  acetaminophen (TYLENOL) tablet 650 mg, 650 mg, Oral, Q4H PRN, Falguni Renee MD  •  aspirin chewable tablet 81 mg, 81 mg, Oral, Daily, Falguni Renee MD, 81 mg at 01/07/22 0927  •  atorvastatin (LIPITOR) tablet 10 mg, 10 mg, Oral, Daily, Falguni Renee MD, 10 mg at 01/07/22 0927  •  benzonatate (TESSALON) capsule 100 mg, 100 mg, Oral, TID PRN, Taniya Nicholas MD, 100 mg at 01/07/22 0933  •  heparin (porcine) 5000 UNIT/ML injection 3,100-6,200 Units, 30-60 Units/kg, Intravenous, Q6H PRN, Leyda Rodriguez APRN, 3,100 Units at 01/05/22 2107  •  heparin 18123 units/250 mL (100 units/mL) in 0.45 % NaCl infusion, 12 Units/kg/hr, Intravenous, Titrated, Leyda Rodriguez APRN, Last Rate: 15.45 mL/hr at 01/06/22 1942, 15 Units/kg/hr at 01/06/22 1942  •  Hold medication, 1 each, Does not apply, Continuous PRN, Rafaela Stout, ELI, APRN  •  melatonin tablet 3 mg, 3 mg, Oral, Nightly PRN, Falguni Renee MD, 3 mg at 01/05/22 2210  •  metoprolol succinate XL (TOPROL-XL) 24 hr tablet 100 mg, 100 mg, Oral, Daily, Falguni Renee MD, 100 mg at 01/07/22 0927  •  nitroglycerin (NITROSTAT) SL tablet 0.4 mg, 0.4 mg, Sublingual, Q5 Min PRN, Falguni Renee MD  •  ondansetron (ZOFRAN) tablet 4 mg, 4 mg, Oral, Q6H PRN **OR** ondansetron (ZOFRAN) injection 4 mg, 4 mg, Intravenous, Q6H PRN, Falguni Renee MD  •  Pharmacy to dose warfarin, , Does not apply, Continuous PRN, Taniya Nicholas,  MD  •  [COMPLETED] Insert peripheral IV, , , Once **AND** sodium chloride 0.9 % flush 10 mL, 10 mL, Intravenous, PRN, Dylan Barnhart MD      Objective     Vital Sign Min/Max for last 24 hours  Temp  Min: 97.9 °F (36.6 °C)  Max: 99.5 °F (37.5 °C)   BP  Min: 120/67  Max: 137/69    Pulse  Min: 62  Max: 68     Wt Readings from Last 3 Encounters:   01/07/22 102 kg (225 lb 6.4 oz)        Intake/Output Summary (Last 24 hours) at 1/7/2022 0946  Last data filed at 1/7/2022 0700  Gross per 24 hour   Intake 240 ml   Output --   Net 240 ml     Physical Exam:      General Appearance:   Overweight male in no distress   Head:    Normocephalic, atraumatic   Eyes:            Conjunctivae normal, anicteric, no xanthelasma   Neck:   no carotid bruit, no JVD   Lungs:    Decreased breath sounds right base, clear on left    Heart:    Regular rate and rhythm.  Normal S1 and S2. No murmur, no gallop, rub or lift.    Chest Wall:    No abnormalities observed   Abdomen:     Normal bowel sounds, no masses, no organomegaly, soft        nontender, nondistended, no guarding, no rebound                tenderness   Rectal:     Deferred   Extremities:   No clubbing, cyanosis or edema.         Skin:  Large hematoma around the upper abdomen and back area slowly improving    Neurologic:   awake alert and oriented x3, speech clear and approp, no facial drooping      Monitor:  nsr  Results Review:     I reviewed the patient's new clinical results.    Sodium Sodium   Date Value Ref Range Status   01/07/2022 132 (L) 136 - 145 mmol/L Final   01/06/2022 131 (L) 136 - 145 mmol/L Final   01/05/2022 131 (L) 136 - 145 mmol/L Final      Potassium Potassium   Date Value Ref Range Status   01/07/2022 4.3 3.5 - 5.2 mmol/L Final   01/06/2022 4.6 3.5 - 5.2 mmol/L Final   01/05/2022 4.4 3.5 - 5.2 mmol/L Final      Chloride Chloride   Date Value Ref Range Status   01/07/2022 97 (L) 98 - 107 mmol/L Final   01/06/2022 94 (L) 98 - 107 mmol/L Final   01/05/2022 97  (L) 98 - 107 mmol/L Final      Bicarbonate No results found for: PLASMABICARB   BUN BUN   Date Value Ref Range Status   01/07/2022 13 8 - 23 mg/dL Final   01/06/2022 12 8 - 23 mg/dL Final   01/05/2022 12 8 - 23 mg/dL Final      Creatinine Creatinine   Date Value Ref Range Status   01/07/2022 0.80 0.76 - 1.27 mg/dL Final   01/06/2022 0.72 (L) 0.76 - 1.27 mg/dL Final   01/05/2022 0.64 (L) 0.76 - 1.27 mg/dL Final      Calcium Calcium   Date Value Ref Range Status   01/07/2022 8.9 8.6 - 10.5 mg/dL Final   01/06/2022 8.8 8.6 - 10.5 mg/dL Final   01/05/2022 8.3 (L) 8.6 - 10.5 mg/dL Final      Magnesium No results found for: MG     Results from last 7 days   Lab Units 01/07/22  0558   WBC 10*3/mm3 8.64   HEMOGLOBIN g/dL 9.6*   HEMATOCRIT % 28.3*   PLATELETS 10*3/mm3 366     No results found for: TROPONINT  No results found for: CHOL  No results found for: HDL  No results found for: LDL  No results found for: TRIG  No components found for: CHOLHDL    Results from last 7 days   Lab Units 01/07/22  0558 01/06/22  0509 01/05/22  0439   INR  1.26* 1.27* 1.31*         Echo EF Estimated  No results found for: ECHOEFEST       Assessment/ Plan  1. 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 mechanical prosthesis       -on Warfarin       -follows with Dr Levya   3. right-sided pleural effusion        -s/p R thoracentesis 1/3/21:500cc of bloody fluid removed  4. HTN  Plan #1 remains in sinus rhythm.  Blood pressure is good.  The mechanical aortic valve prosthetic sounds are good on exam.  #2 continues on heparin until INR in therapeutic range.  INR only 1.26 today.  Pharmacy managing warfarin.  Hemoglobin, hematocrit are stable.  #3 remaining stable cardiac wise.  We will check back to review on Monday unless called over the weekend.  #4 discussed with the patient and his wife.  Colin Yun MD  01/07/22  09:46 EST      Time: 18 minutes

## 2022-01-07 NOTE — PROGRESS NOTES
Name: Claudio Krishna ADMIT: 2022   : 1955  PCP: Shayan Celeste MD    MRN: [id number] LOS: 5 days   AGE/SEX: 66 y.o. male  ROOM: [facility]     Subjective   Subjective   Patient seen this morning.  No acute events overnight.  Wife is present at bedside.  Denies any bleeding.  On heparin, INR still subtherapeutic.  Feels like his hematoma overall improving.     Review of Systems   As above  Objective   Objective   Vital Signs  Temp:  [97.9 °F (36.6 °C)-99.5 °F (37.5 °C)] 98.6 °F (37 °C)  Heart Rate:  [58-68] 58  Resp:  [16-18] 16  BP: (120-137)/(67-77) 127/70  SpO2:  [0 %-94 %] 94 %  on   ;   Device (Oxygen Therapy): room air  Body mass index is 32.34 kg/m².  Physical Exam  Vitals and nursing note reviewed.   Constitutional:       Appearance: Normal appearance.   HENT:      Head: Normocephalic and atraumatic.   Cardiovascular:      Rate and Rhythm: Normal rate and regular rhythm.      Heart sounds Murmur heard.   Pulmonary:      Effort: Pulmonary effort is normal.      Breath sounds: Normal breath sounds.      Comments: CTA, no wheezing or rails.  Abdominal:      General: Bowel sounds are normal. There is no distension.      Palpations: Abdomen is soft.      Tenderness: There is no abdominal tenderness.      Comments: Significant bruising to his abdomen, worse on right side and extends to his right flank ( improving)  Skin:     General: Skin is warm and dry.   Neurological:      Mental Status: He is alert and oriented to person, place, and time. Mental status is at baseline.   Psychiatric:         Mood and Affect: Mood normal.         Behavior: Behavior normal.      Results Review     I reviewed the patient's new clinical results.  Results from last 7 days   Lab Units 22  0558 22  0509 22  0439 22  0619   WBC 10*3/mm3 8.64 9.74 9.29 9.13   HEMOGLOBIN g/dL 9.6* 9.0* 8.5* 9.0*   PLATELETS 10*3/mm3 366 323 239 268     Results from last 7 days   Lab Units 22  0558  01/06/22  0509 01/05/22  0439 01/04/22 0619   SODIUM mmol/L 132* 131* 131* 134*   POTASSIUM mmol/L 4.3 4.6 4.4 4.6   CHLORIDE mmol/L 97* 94* 97* 97*   CO2 mmol/L 25.8 28.9 24.8 29.9*   BUN mg/dL 13 12 12 14   CREATININE mg/dL 0.80 0.72* 0.64* 0.73*   GLUCOSE mg/dL 106* 119* 118* 129*   Estimated Creatinine Clearance: 108.7 mL/min (by C-G formula based on SCr of 0.8 mg/dL).  Results from last 7 days   Lab Units 01/01/22  1446   ALBUMIN g/dL 3.80   BILIRUBIN mg/dL 0.7   ALK PHOS U/L 58   AST (SGOT) U/L 27   ALT (SGPT) U/L 21     Results from last 7 days   Lab Units 01/07/22  0558 01/06/22  0509 01/05/22 0439 01/04/22  0619 01/03/22  0539 01/03/22  0539 01/02/22 0427 01/01/22  1446   CALCIUM mg/dL 8.9 8.8 8.3* 8.5*   < > 8.4*   < > 8.8   ALBUMIN g/dL  --   --   --   --   --   --   --  3.80   MAGNESIUM mg/dL  --   --   --   --   --  2.2  --   --    PHOSPHORUS mg/dL  --   --   --   --   --  3.2  --   --     < > = values in this interval not displayed.     Results from last 7 days   Lab Units 01/06/22  0509 01/05/22  0439 01/03/22 2323 01/02/22 0427   PROCALCITONIN ng/mL 0.06 0.04 0.06 0.04     COVID19   Date Value Ref Range Status   01/01/2022 Not Detected Not Detected - Ref. Range Final     No results found for: HGBA1C, POCGLU    XR Chest 1 View  ONE VIEW PORTABLE CHEST     HISTORY: Follow-up of right hemothorax.     FINDINGS: There is a small-to-moderate right pleural effusion with some  adjacent atelectasis and the effusion shows minimal enlargement since  yesterday's exam. There is no pneumothorax following thoracentesis. The  left lung is clear. The heart remains mildly enlarged with sternal wires  from previous cardiac surgery.     This report was finalized on 1/5/2022 9:24 AM by Dr. Ang Cool M.D.       Scheduled Medications  aspirin, 81 mg, Oral, Daily  atorvastatin, 10 mg, Oral, Daily  metoprolol succinate XL, 100 mg, Oral, Daily  warfarin, 10 mg, Oral, Daily    Infusions  heparin, 12 Units/kg/hr, Last  Rate: 18 Units/kg/hr (01/07/22 1324)  hold, 1 each  Pharmacy to dose warfarin,     Diet  Diet Regular; Cardiac       Assessment/Plan     Active Hospital Problems    Diagnosis  POA   • **Hemothorax [J94.2]  Unknown   • Mechanical heart valve present [Z95.2]  Not Applicable   • Essential hypertension [I10]  Unknown   • Abdominal wall hematoma [S30.1XXA]  Yes      Resolved Hospital Problems   No resolved problems to display.     #Large abdominal wall hematoma/hemothorax  -Surgery consulted, no indications for interventions.    -Patient has underwent a thoracentesis with 500 mL off, fluid cultures remain negative.  Thoracic surgery has signed off and recommend incentive spirometry.  -INR even lower today at 1.27. Continue Coumadin bridge with goal INR 2.5-3.5. pharmacy dosing coumadin, daily inr  -Continue to trend H&H and transfuse as needed.  -PT/OT, encourage ambulation- has been up walking around nurses station.     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  Previously was sent home with Lovenox, however it it was administered by his sister-in-law who is a nurse, however she doesn't live close any longer able not be able to administer it now.  -Continue warfarin dosing per pharmacy  -Monitor daily INR     #Essential hypertension  -Lisinopril being held, continue metoprolol. blood pressure stable.     #Mild hyponatremia  -Monitor daily BMP     · heparin gtt and coumadin for DVT prophylaxis.  · Full code.  · Discussed with patient  · Anticipate discharge to be determined; home when INR therapeutic.  ·       Taniya Nicholas MD  [facility]  01/07/22  14:52 EST

## 2022-01-07 NOTE — PLAN OF CARE
Problem: Adult Inpatient Plan of Care  Goal: Plan of Care Review  Outcome: Ongoing, Progressing   Goal Outcome Evaluation:   Vss.Ambulated in [place] independently.Remains on heparin gtt.No c/o pain or n/v.

## 2022-01-08 LAB
ANION GAP SERPL CALCULATED.3IONS-SCNC: 8.1 MMOL/L (ref 5–15)
APTT PPP: 117.2 SECONDS (ref 22.7–35.4)
APTT PPP: 56.9 SECONDS (ref 22.7–35.4)
BACTERIA FLD CULT: NORMAL
BASOPHILS # BLD AUTO: 0.05 10*3/MM3 (ref 0–0.2)
BASOPHILS NFR BLD AUTO: 0.7 % (ref 0–1.5)
BUN SERPL-MCNC: 13 MG/DL (ref 8–23)
BUN/CREAT SERPL: 19.1 (ref 7–25)
CALCIUM SPEC-SCNC: 8.8 MG/DL (ref 8.6–10.5)
CHLORIDE SERPL-SCNC: 97 MMOL/L (ref 98–107)
CO2 SERPL-SCNC: 27.9 MMOL/L (ref 22–29)
CREAT SERPL-MCNC: 0.68 MG/DL (ref 0.76–1.27)
DEPRECATED RDW RBC AUTO: 44.3 FL (ref 37–54)
EOSINOPHIL # BLD AUTO: 0.4 10*3/MM3 (ref 0–0.4)
EOSINOPHIL NFR BLD AUTO: 5.5 % (ref 0.3–6.2)
ERYTHROCYTE [DISTWIDTH] IN BLOOD BY AUTOMATED COUNT: 12.9 % (ref 12.3–15.4)
GFR SERPL CREATININE-BSD FRML MDRD: 117 ML/MIN/1.73
GLUCOSE SERPL-MCNC: 105 MG/DL (ref 65–99)
GRAM STN SPEC: NORMAL
GRAM STN SPEC: NORMAL
HCT VFR BLD AUTO: 26 % (ref 37.5–51)
HGB BLD-MCNC: 8.7 G/DL (ref 13–17.7)
IMM GRANULOCYTES # BLD AUTO: 0.04 10*3/MM3 (ref 0–0.05)
IMM GRANULOCYTES NFR BLD AUTO: 0.5 % (ref 0–0.5)
INR PPP: 1.47 (ref 0.9–1.1)
LYMPHOCYTES # BLD AUTO: 0.91 10*3/MM3 (ref 0.7–3.1)
LYMPHOCYTES NFR BLD AUTO: 12.4 % (ref 19.6–45.3)
MCH RBC QN AUTO: 31.9 PG (ref 26.6–33)
MCHC RBC AUTO-ENTMCNC: 33.5 G/DL (ref 31.5–35.7)
MCV RBC AUTO: 95.2 FL (ref 79–97)
MONOCYTES # BLD AUTO: 0.73 10*3/MM3 (ref 0.1–0.9)
MONOCYTES NFR BLD AUTO: 10 % (ref 5–12)
NEUTROPHILS NFR BLD AUTO: 5.2 10*3/MM3 (ref 1.7–7)
NEUTROPHILS NFR BLD AUTO: 70.9 % (ref 42.7–76)
NRBC BLD AUTO-RTO: 0 /100 WBC (ref 0–0.2)
PLATELET # BLD AUTO: 345 10*3/MM3 (ref 140–450)
PMV BLD AUTO: 9.5 FL (ref 6–12)
POTASSIUM SERPL-SCNC: 4.8 MMOL/L (ref 3.5–5.2)
PROTHROMBIN TIME: 17.6 SECONDS (ref 11.7–14.2)
RBC # BLD AUTO: 2.73 10*6/MM3 (ref 4.14–5.8)
SODIUM SERPL-SCNC: 133 MMOL/L (ref 136–145)
WBC NRBC COR # BLD: 7.33 10*3/MM3 (ref 3.4–10.8)

## 2022-01-08 PROCEDURE — 85610 PROTHROMBIN TIME: CPT | Performed by: INTERNAL MEDICINE

## 2022-01-08 PROCEDURE — 80048 BASIC METABOLIC PNL TOTAL CA: CPT | Performed by: STUDENT IN AN ORGANIZED HEALTH CARE EDUCATION/TRAINING PROGRAM

## 2022-01-08 PROCEDURE — 25010000002 HEPARIN (PORCINE) PER 1000 UNITS: Performed by: NURSE PRACTITIONER

## 2022-01-08 PROCEDURE — 85025 COMPLETE CBC W/AUTO DIFF WBC: CPT | Performed by: NURSE PRACTITIONER

## 2022-01-08 PROCEDURE — 25010000002 HEPARIN (PORCINE) 25000-0.45 UT/250ML-% SOLUTION: Performed by: NURSE PRACTITIONER

## 2022-01-08 PROCEDURE — 85730 THROMBOPLASTIN TIME PARTIAL: CPT | Performed by: THORACIC SURGERY (CARDIOTHORACIC VASCULAR SURGERY)

## 2022-01-08 PROCEDURE — 85730 THROMBOPLASTIN TIME PARTIAL: CPT | Performed by: NURSE PRACTITIONER

## 2022-01-08 RX ORDER — POLYETHYLENE GLYCOL 3350 17 G/17G
17 POWDER, FOR SOLUTION ORAL DAILY
Status: DISCONTINUED | OUTPATIENT
Start: 2022-01-08 | End: 2022-01-19 | Stop reason: HOSPADM

## 2022-01-08 RX ORDER — WARFARIN SODIUM 7.5 MG/1
7.5 TABLET ORAL
Status: DISCONTINUED | OUTPATIENT
Start: 2022-01-08 | End: 2022-01-08

## 2022-01-08 RX ORDER — DOCUSATE SODIUM 100 MG/1
100 CAPSULE, LIQUID FILLED ORAL 2 TIMES DAILY
Status: DISCONTINUED | OUTPATIENT
Start: 2022-01-08 | End: 2022-01-19 | Stop reason: HOSPADM

## 2022-01-08 RX ORDER — WARFARIN SODIUM 7.5 MG/1
7.5 TABLET ORAL
Status: COMPLETED | OUTPATIENT
Start: 2022-01-08 | End: 2022-01-08

## 2022-01-08 RX ADMIN — DOCUSATE SODIUM 100 MG: 100 CAPSULE, LIQUID FILLED ORAL at 11:19

## 2022-01-08 RX ADMIN — HEPARIN SODIUM 18 UNITS/KG/HR: 10000 INJECTION, SOLUTION INTRAVENOUS at 00:25

## 2022-01-08 RX ADMIN — HEPARIN SODIUM 3100 UNITS: 5000 INJECTION INTRAVENOUS; SUBCUTANEOUS at 17:09

## 2022-01-08 RX ADMIN — ASPIRIN 81 MG: 81 TABLET, CHEWABLE ORAL at 08:39

## 2022-01-08 RX ADMIN — METOPROLOL SUCCINATE 100 MG: 100 TABLET, EXTENDED RELEASE ORAL at 08:39

## 2022-01-08 RX ADMIN — WARFARIN 7.5 MG: 7.5 TABLET ORAL at 17:10

## 2022-01-08 RX ADMIN — HEPARIN SODIUM 15 UNITS/KG/HR: 10000 INJECTION, SOLUTION INTRAVENOUS at 15:35

## 2022-01-08 RX ADMIN — ATORVASTATIN CALCIUM 10 MG: 20 TABLET, FILM COATED ORAL at 08:40

## 2022-01-08 NOTE — PROGRESS NOTES
Name: Claudio Krishna ADMIT: 2022   : 1955  PCP: Shayan Celeste MD    MRN: [id number] LOS: 6 days   AGE/SEX: 66 y.o. male  ROOM: [facility]     Subjective   Subjective   Patient seen this morning.  No acute events overnight.  Remains on heparin drip.  Hemoglobin slightly down today at 8.7, but no signs of bleeding.  Hematoma improving.  Denies chest pain, fevers, chills, shortness of breath.   Review of Systems   As above  Objective   Objective   Vital Signs  Temp:  [98 °F (36.7 °C)-98.6 °F (37 °C)] 98.3 °F (36.8 °C)  Heart Rate:  [58-73] 63  Resp:  [16-18] 16  BP: (124-143)/(63-79) 124/63  SpO2:  [94 %] 94 %  on   ;   Device (Oxygen Therapy): room air  Body mass index is 31.62 kg/m².  Physical Exam  Vitals and nursing note reviewed.   Constitutional:       Appearance: Normal appearance.   HENT:      Head: Normocephalic and atraumatic.   Cardiovascular:      Rate and Rhythm: Normal rate and regular rhythm.      Heart sounds Murmur heard.   Pulmonary:      Effort: Pulmonary effort is normal.      Breath sounds: Normal breath sounds.      Comments: CTA, no wheezing or rails.  Abdominal:      General: Bowel sounds are normal. There is no distension.      Palpations: Abdomen is soft.      Tenderness: There is no abdominal tenderness.      Comments: Significant bruising to his abdomen, worse on right side and extends to his right flank ( improving)  Skin:     General: Skin is warm and dry.   Neurological:      Mental Status: He is alert and oriented to person, place, and time. Mental status is at baseline.   Psychiatric:         Mood and Affect: Mood normal.         Behavior: Behavior normal.      Results Review     I reviewed the patient's new clinical results.  Results from last 7 days   Lab Units 22  0556 22  0558 22  0509 22  0439   WBC 10*3/mm3 7.33 8.64 9.74 9.29   HEMOGLOBIN g/dL 8.7* 9.6* 9.0* 8.5*   PLATELETS 10*3/mm3 345 366 323 239     Results from last 7 days   Lab Units  01/08/22  0556 01/07/22  0558 01/06/22  0509 01/05/22 0439   SODIUM mmol/L 133* 132* 131* 131*   POTASSIUM mmol/L 4.8 4.3 4.6 4.4   CHLORIDE mmol/L 97* 97* 94* 97*   CO2 mmol/L 27.9 25.8 28.9 24.8   BUN mg/dL 13 13 12 12   CREATININE mg/dL 0.68* 0.80 0.72* 0.64*   GLUCOSE mg/dL 105* 106* 119* 118*   Estimated Creatinine Clearance: 107.7 mL/min (A) (by C-G formula based on SCr of 0.68 mg/dL (L)).  Results from last 7 days   Lab Units 01/01/22  1446   ALBUMIN g/dL 3.80   BILIRUBIN mg/dL 0.7   ALK PHOS U/L 58   AST (SGOT) U/L 27   ALT (SGPT) U/L 21     Results from last 7 days   Lab Units 01/08/22  0556 01/07/22  0558 01/06/22  0509 01/05/22  0439 01/04/22  0619 01/03/22  0539 01/02/22 0427 01/01/22  1446   CALCIUM mg/dL 8.8 8.9 8.8 8.3*   < > 8.4*   < > 8.8   ALBUMIN g/dL  --   --   --   --   --   --   --  3.80   MAGNESIUM mg/dL  --   --   --   --   --  2.2  --   --    PHOSPHORUS mg/dL  --   --   --   --   --  3.2  --   --     < > = values in this interval not displayed.     Results from last 7 days   Lab Units 01/06/22  0509 01/05/22  0439 01/03/22 2323 01/02/22 0427   PROCALCITONIN ng/mL 0.06 0.04 0.06 0.04     COVID19   Date Value Ref Range Status   01/01/2022 Not Detected Not Detected - Ref. Range Final     No results found for: HGBA1C, POCGLU    XR Chest 1 View  ONE VIEW PORTABLE CHEST     HISTORY: Follow-up of right hemothorax.     FINDINGS: There is a small-to-moderate right pleural effusion with some  adjacent atelectasis and the effusion shows minimal enlargement since  yesterday's exam. There is no pneumothorax following thoracentesis. The  left lung is clear. The heart remains mildly enlarged with sternal wires  from previous cardiac surgery.     This report was finalized on 1/5/2022 9:24 AM by Dr. Ang Cool M.D.       Scheduled Medications  aspirin, 81 mg, Oral, Daily  atorvastatin, 10 mg, Oral, Daily  docusate sodium, 100 mg, Oral, BID  metoprolol succinate XL, 100 mg, Oral, Daily  polyethylene  glycol, 17 g, Oral, Daily  warfarin, 7.5 mg, Oral, Once    Infusions  heparin, 12 Units/kg/hr, Last Rate: 15 Units/kg/hr (01/08/22 0941)  hold, 1 each  Pharmacy to dose warfarin,     Diet  Diet Regular; Cardiac       Assessment/Plan     Active Hospital Problems    Diagnosis  POA   • **Hemothorax [J94.2]  Unknown   • Mechanical heart valve present [Z95.2]  Not Applicable   • Essential hypertension [I10]  Unknown   • Abdominal wall hematoma [S30.1XXA]  Yes      Resolved Hospital Problems   No resolved problems to display.     #Large abdominal wall hematoma/hemothorax  -Surgery consulted, no indications for interventions.    -Patient has underwent a thoracentesis with 500 mL off, fluid cultures remain negative.  Thoracic surgery has signed off and recommend incentive spirometry.  . Continue Coumadin bridge with goal INR 2-3. pharmacy dosing coumadin, daily inr  -Continue to trend H&H and transfuse as needed.  -PT/OT, encourage ambulation- has been up walking around nurses station.     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  Previously was sent home with Lovenox, however it it was administered by his sister-in-law who is a nurse, however she doesn't live close any longer able not be able to administer it now.  -Continue warfarin dosing per pharmacy  -Monitor daily INR  -INR 1.47 today.  -Home when INR is therapeutic.     #Essential hypertension  -Lisinopril being held, continue metoprolol.   blood pressure stable.     #Mild hyponatremia  -Monitor daily BMP  -Improving     · heparin gtt and coumadin for DVT prophylaxis.  · Full code.  · Discussed with patient  · Anticipate discharge to be  determined; home when INR therapeutic, likely in 1-2 days   ·       Taniya Nicholas MD  [facility]  01/08/22  11:33 EST

## 2022-01-08 NOTE — PLAN OF CARE
Goal Outcome Evaluation:  Plan of Care Reviewed With: patient        Progress: improving  Outcome Summary: Hep gtt at theraputic level yesterday, recheck in AM. VSS, will continue to monitor.

## 2022-01-08 NOTE — PROGRESS NOTES
[facility] Clinical Pharmacy Services: Warfarin Dosing/Monitoring Consult    Claudio Krishna is a 66 y.o. male, estimated creatinine clearance is 109.2 mL/min (A) (by C-G formula based on SCr of 0.72 mg/dL (L)). weighing 103 kg (226 lb 8 oz).    Results from last 7 days   Lab Units 01/07/22  0558 01/06/22  0509 01/05/22  1957 01/05/22  1226 01/05/22  0439 01/04/22  0619 01/04/22  0619 01/03/22  2323 01/03/22  2323 01/03/22  1837 01/03/22  1600   INR  1.26* 1.27*  --   --  1.31*  --  1.31*  --   --   --  1.41*   APTT seconds 51.1* 75.1* 59.9* 87.1* 45.4*   < > 80.0*   < > 56.2*   < > 35.4   HEMOGLOBIN g/dL 9.6* 9.0*  --   --  8.5*  --  9.0*  --  8.8*   < > 9.7*  9.7*   HEMATOCRIT % 28.3* 27.0*  --   --  24.6*  --  26.3*  --  26.1*   < > 28.2*  28.2*   PLATELETS 10*3/mm3 366 323  --   --  239  --  268  --   --   --  279    < > = values in this interval not displayed.     Prior to admission anticoagulation: 5mg WedSat, 7.5mg all other days. INR goal 2-3 managed by [facility] cardiology outpatient.     Hospital Anticoagulation:  Consulting provider: Yu  Start date: 1/4/22  Indication:  aortic valve  Target INR: 2-3   Expected duration: indefinite  Bridge Therapy: YES  with unfractionated heparin drip    Date 1/4/22 1/5 1/6 1/7 1/8        INR 1.31 1.31 1.27  1.26  1.47        Dose 5mg 7.5 mg 10mg 10mg  7.5mg          Potential food or drug interactions:   - Aspirin: May enhance the anticoagulant effect of warfarin.     Education complete?/Date: No; plan for follow up TBD    Assessment/Plan:  Will give warfarin 7.5mgx1 today.  Anticipate INR to start trending towards goal tomorrow, likely seeing result of warfarin being held 1/1 - 1/3  Monitor for any signs or symptoms of bleeding  Follow up daily INRs and dose adjustments    Pharmacy will continue to follow until discharge or discontinuation of warfarin.    Linda Wan, [facility]

## 2022-01-08 NOTE — PLAN OF CARE
Problem: Adult Inpatient Plan of Care  Goal: Plan of Care Review  1/8/2022 1459 by Lis Lira, RN  Outcome: Ongoing, Progressing   Goal Outcome Evaluation:   Vss,Remains on heparin gtt.Up ad scarlett, ambulated in [place].Up to chair. C/o constipation, received stool softener.No c/o pain or n/v.

## 2022-01-09 LAB
ANION GAP SERPL CALCULATED.3IONS-SCNC: 8 MMOL/L (ref 5–15)
APTT PPP: 62.7 SECONDS (ref 22.7–35.4)
APTT PPP: 76.9 SECONDS (ref 22.7–35.4)
APTT PPP: 90 SECONDS (ref 22.7–35.4)
APTT PPP: 94.3 SECONDS (ref 22.7–35.4)
BASOPHILS # BLD AUTO: 0.04 10*3/MM3 (ref 0–0.2)
BASOPHILS NFR BLD AUTO: 0.6 % (ref 0–1.5)
BUN SERPL-MCNC: 12 MG/DL (ref 8–23)
BUN/CREAT SERPL: 18.5 (ref 7–25)
CALCIUM SPEC-SCNC: 8.5 MG/DL (ref 8.6–10.5)
CHLORIDE SERPL-SCNC: 98 MMOL/L (ref 98–107)
CO2 SERPL-SCNC: 27 MMOL/L (ref 22–29)
CREAT SERPL-MCNC: 0.65 MG/DL (ref 0.76–1.27)
DEPRECATED RDW RBC AUTO: 45.8 FL (ref 37–54)
EOSINOPHIL # BLD AUTO: 0.32 10*3/MM3 (ref 0–0.4)
EOSINOPHIL NFR BLD AUTO: 4.5 % (ref 0.3–6.2)
ERYTHROCYTE [DISTWIDTH] IN BLOOD BY AUTOMATED COUNT: 13.1 % (ref 12.3–15.4)
GFR SERPL CREATININE-BSD FRML MDRD: 123 ML/MIN/1.73
GLUCOSE SERPL-MCNC: 107 MG/DL (ref 65–99)
HCT VFR BLD AUTO: 26.2 % (ref 37.5–51)
HGB BLD-MCNC: 8.6 G/DL (ref 13–17.7)
IMM GRANULOCYTES # BLD AUTO: 0.04 10*3/MM3 (ref 0–0.05)
IMM GRANULOCYTES NFR BLD AUTO: 0.6 % (ref 0–0.5)
INR PPP: 1.67 (ref 0.9–1.1)
LYMPHOCYTES # BLD AUTO: 0.88 10*3/MM3 (ref 0.7–3.1)
LYMPHOCYTES NFR BLD AUTO: 12.2 % (ref 19.6–45.3)
MCH RBC QN AUTO: 31.6 PG (ref 26.6–33)
MCHC RBC AUTO-ENTMCNC: 32.8 G/DL (ref 31.5–35.7)
MCV RBC AUTO: 96.3 FL (ref 79–97)
MONOCYTES # BLD AUTO: 0.66 10*3/MM3 (ref 0.1–0.9)
MONOCYTES NFR BLD AUTO: 9.2 % (ref 5–12)
NEUTROPHILS NFR BLD AUTO: 5.25 10*3/MM3 (ref 1.7–7)
NEUTROPHILS NFR BLD AUTO: 72.9 % (ref 42.7–76)
NRBC BLD AUTO-RTO: 0 /100 WBC (ref 0–0.2)
PLATELET # BLD AUTO: 338 10*3/MM3 (ref 140–450)
PMV BLD AUTO: 9.2 FL (ref 6–12)
POTASSIUM SERPL-SCNC: 4.7 MMOL/L (ref 3.5–5.2)
PROTHROMBIN TIME: 19.5 SECONDS (ref 11.7–14.2)
RBC # BLD AUTO: 2.72 10*6/MM3 (ref 4.14–5.8)
SODIUM SERPL-SCNC: 133 MMOL/L (ref 136–145)
WBC NRBC COR # BLD: 7.19 10*3/MM3 (ref 3.4–10.8)

## 2022-01-09 PROCEDURE — 25010000002 HEPARIN (PORCINE) PER 1000 UNITS: Performed by: NURSE PRACTITIONER

## 2022-01-09 PROCEDURE — 85610 PROTHROMBIN TIME: CPT | Performed by: INTERNAL MEDICINE

## 2022-01-09 PROCEDURE — 85730 THROMBOPLASTIN TIME PARTIAL: CPT | Performed by: STUDENT IN AN ORGANIZED HEALTH CARE EDUCATION/TRAINING PROGRAM

## 2022-01-09 PROCEDURE — 85730 THROMBOPLASTIN TIME PARTIAL: CPT | Performed by: THORACIC SURGERY (CARDIOTHORACIC VASCULAR SURGERY)

## 2022-01-09 PROCEDURE — 85025 COMPLETE CBC W/AUTO DIFF WBC: CPT | Performed by: NURSE PRACTITIONER

## 2022-01-09 PROCEDURE — 36415 COLL VENOUS BLD VENIPUNCTURE: CPT | Performed by: INTERNAL MEDICINE

## 2022-01-09 PROCEDURE — 80048 BASIC METABOLIC PNL TOTAL CA: CPT | Performed by: STUDENT IN AN ORGANIZED HEALTH CARE EDUCATION/TRAINING PROGRAM

## 2022-01-09 PROCEDURE — 25010000002 HEPARIN (PORCINE) 25000-0.45 UT/250ML-% SOLUTION: Performed by: NURSE PRACTITIONER

## 2022-01-09 RX ORDER — WARFARIN SODIUM 7.5 MG/1
7.5 TABLET ORAL
Status: COMPLETED | OUTPATIENT
Start: 2022-01-09 | End: 2022-01-09

## 2022-01-09 RX ADMIN — ATORVASTATIN CALCIUM 10 MG: 20 TABLET, FILM COATED ORAL at 08:15

## 2022-01-09 RX ADMIN — ASPIRIN 81 MG: 81 TABLET, CHEWABLE ORAL at 08:14

## 2022-01-09 RX ADMIN — METOPROLOL SUCCINATE 100 MG: 100 TABLET, EXTENDED RELEASE ORAL at 08:14

## 2022-01-09 RX ADMIN — WARFARIN 7.5 MG: 7.5 TABLET ORAL at 17:24

## 2022-01-09 RX ADMIN — DOCUSATE SODIUM 100 MG: 100 CAPSULE, LIQUID FILLED ORAL at 08:15

## 2022-01-09 RX ADMIN — HEPARIN SODIUM 14 UNITS/KG/HR: 10000 INJECTION, SOLUTION INTRAVENOUS at 09:33

## 2022-01-09 RX ADMIN — HEPARIN SODIUM 3100 UNITS: 5000 INJECTION INTRAVENOUS; SUBCUTANEOUS at 17:22

## 2022-01-09 RX ADMIN — POLYETHYLENE GLYCOL 3350 17 G: 17 POWDER, FOR SOLUTION ORAL at 08:15

## 2022-01-09 NOTE — PROGRESS NOTES
[facility] Clinical Pharmacy Services: Warfarin Dosing/Monitoring Consult    Claudio Krishna is a 66 y.o. male, estimated creatinine clearance is 109.2 mL/min (A) (by C-G formula based on SCr of 0.72 mg/dL (L)). weighing 103 kg (226 lb 8 oz).    Results from last 7 days   Lab Units 01/09/22  0601 01/09/22  0023 01/08/22  1559 01/08/22  0556 01/07/22  1605 01/07/22  0558 01/07/22  0558 01/06/22  0509 01/06/22  0509 01/05/22  1226 01/05/22  0439   INR  1.67*  --   --  1.47*  --   --  1.26*  --  1.27*  --  1.31*   APTT seconds 94.3* 76.9* 56.9* 117.2* 82.1*   < > 51.1*   < > 75.1*   < > 45.4*   HEMOGLOBIN g/dL 8.6*  --   --  8.7*  --   --  9.6*  --  9.0*  --  8.5*   HEMATOCRIT % 26.2*  --   --  26.0*  --   --  28.3*  --  27.0*  --  24.6*   PLATELETS 10*3/mm3 338  --   --  345  --   --  366  --  323  --  239    < > = values in this interval not displayed.     Prior to admission anticoagulation: 5mg WedSat, 7.5mg all other days. INR goal 2-3 managed by [facility] cardiology outpatient.     Hospital Anticoagulation:  Consulting provider: Yu  Start date: 1/4/22  Indication:  aortic valve  Target INR: 2-3   Expected duration: indefinite  Bridge Therapy: YES  with unfractionated heparin drip    Date 1/4/22 1/5 1/6 1/7 1/8 1/9       INR 1.31 1.31 1.27  1.26  1.47  1.67       Dose 5mg 7.5 mg 10mg 10mg  7.5mg  7.5mg         Potential food or drug interactions:   - Aspirin: May enhance the anticoagulant effect of warfarin.     Education complete?/Date: No; plan for follow up TBD    Assessment/Plan:  Will give  another dose of warfarin 7.5mgx1 today.  Anticipate INR to be almost at goal tomorrow, likely seeing result of warfarin being held 1/1 - 1/3  Monitor for any signs or symptoms of bleeding  Follow up daily INRs and dose adjustments    Pharmacy will continue to follow until discharge or discontinuation of warfarin.    Linda Wan, [facility]

## 2022-01-09 NOTE — PLAN OF CARE
Problem: Adult Inpatient Plan of Care  Goal: Plan of Care Review  Outcome: Ongoing, Progressing  Flowsheets (Taken 1/9/2022 3842)  Progress: improving  Plan of Care Reviewed With: patient  Outcome Summary: VSS. Heparin gtt continued, adjusted per order. +ambulation, up ad scarlett. No complaints at this time. Will continue to monitor.

## 2022-01-09 NOTE — PROGRESS NOTES
Name: Claudio Krishna ADMIT: 2022   : 1955  PCP: Shayan Celeste MD    MRN: [id number] LOS: 7 days   AGE/SEX: 66 y.o. male  ROOM: [facility]     Subjective   Subjective   Patient seen this morning.  No acute events overnight.  remains on Heparin drip, INR slowly trending up, 1.67 this morning, up from 1.47 yesterday.  Denies shortness of breath, chest pain, fevers, chills.  Had 2 small bowel movements since yesterday.     Review of Systems   As above  Objective   Objective   Vital Signs  Temp:  [98.1 °F (36.7 °C)-98.5 °F (36.9 °C)] 98.1 °F (36.7 °C)  Heart Rate:  [60-78] 62  Resp:  [16-18] 16  BP: (114-144)/(63-77) 125/63     on   ;   Device (Oxygen Therapy): room air  Body mass index is 32.14 kg/m².  Physical Exam  Vitals and nursing note reviewed.   Constitutional:       Appearance: Normal appearance.   HENT:      Head: Normocephalic and atraumatic.   Cardiovascular:      Rate and Rhythm: Normal rate and regular rhythm.      Heart sounds Murmur heard.   Pulmonary:      Effort: Pulmonary effort is normal.      Breath sounds: Normal breath sounds.      Comments: CTA, no wheezing or rails.  Abdominal:      General: Bowel sounds are normal. There is no distension.      Palpations: Abdomen is soft.      Tenderness: There is no abdominal tenderness.      Comments: Significant bruising to his abdomen, worse on right side and extends to his right flank (slowly improving)  Skin:     General: Skin is warm and dry.   Neurological:      Mental Status: He is alert and oriented to person, place, and time. Mental status is at baseline.   Psychiatric:         Mood and Affect: Mood normal.         Behavior: Behavior normal.      Results Review     I reviewed the patient's new clinical results.  Results from last 7 days   Lab Units 22  0601 22  0556 22  0558 22  0509   WBC 10*3/mm3 7.19 7.33 8.64 9.74   HEMOGLOBIN g/dL 8.6* 8.7* 9.6* 9.0*   PLATELETS 10*3/mm3 338 345 366 323     Results from  last 7 days   Lab Units 01/09/22  0601 01/08/22  0556 01/07/22  0558 01/06/22  0509   SODIUM mmol/L 133* 133* 132* 131*   POTASSIUM mmol/L 4.7 4.8 4.3 4.6   CHLORIDE mmol/L 98 97* 97* 94*   CO2 mmol/L 27.0 27.9 25.8 28.9   BUN mg/dL 12 13 13 12   CREATININE mg/dL 0.65* 0.68* 0.80 0.72*   GLUCOSE mg/dL 107* 105* 106* 119*   Estimated Creatinine Clearance: 108.7 mL/min (A) (by C-G formula based on SCr of 0.65 mg/dL (L)).      Results from last 7 days   Lab Units 01/09/22  0601 01/08/22  0556 01/07/22  0558 01/06/22  0509 01/04/22  0619 01/03/22  0539   CALCIUM mg/dL 8.5* 8.8 8.9 8.8   < > 8.4*   MAGNESIUM mg/dL  --   --   --   --   --  2.2   PHOSPHORUS mg/dL  --   --   --   --   --  3.2    < > = values in this interval not displayed.     Results from last 7 days   Lab Units 01/06/22  0509 01/05/22  0439 01/03/22  2323   PROCALCITONIN ng/mL 0.06 0.04 0.06     COVID19   Date Value Ref Range Status   01/01/2022 Not Detected Not Detected - Ref. Range Final     No results found for: HGBA1C, POCGLU    XR Chest 1 View  ONE VIEW PORTABLE CHEST     HISTORY: Follow-up of right hemothorax.     FINDINGS: There is a small-to-moderate right pleural effusion with some  adjacent atelectasis and the effusion shows minimal enlargement since  yesterday's exam. There is no pneumothorax following thoracentesis. The  left lung is clear. The heart remains mildly enlarged with sternal wires  from previous cardiac surgery.     This report was finalized on 1/5/2022 9:24 AM by Dr. Ang Cool M.D.       Scheduled Medications  aspirin, 81 mg, Oral, Daily  atorvastatin, 10 mg, Oral, Daily  docusate sodium, 100 mg, Oral, BID  metoprolol succinate XL, 100 mg, Oral, Daily  polyethylene glycol, 17 g, Oral, Daily    Infusions  heparin, 12 Units/kg/hr, Last Rate: 14 Units/kg/hr (01/09/22 0933)  hold, 1 each  Pharmacy to dose warfarin,     Diet  Diet Regular; Cardiac       Assessment/Plan     Active Hospital Problems    Diagnosis  POA   •  **Hemothorax [J94.2]  Unknown   • Mechanical heart valve present [Z95.2]  Not Applicable   • Essential hypertension [I10]  Unknown   • Abdominal wall hematoma [S30.1XXA]  Yes      Resolved Hospital Problems   No resolved problems to display.     66 year old gentleman who presented to the emergency room with worsening bruising of his abdomen, he denied any injury but recently had a cough that persisted for several days;    #Large abdominal wall hematoma/hemothorax  -Surgery consulted, no indications for interventions.    -Patient has underwent a thoracentesis with 500 mL off, fluid cultures remain negative.  Thoracic surgery has signed off and recommend incentive spirometry.  . Continue Coumadin bridge with goal INR 2-3. pharmacy dosing coumadin, daily inr  -Continue to trend H&H and transfuse as needed.  -PT/OT, encourage ambulation- has been up walking around nurses station.     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  Previously was sent home with Lovenox, however it it was administered by his sister-in-law who is a nurse, however she doesn't live close any longer able not be able to administer it now.  -Continue warfarin dosing per pharmacy  -Monitor daily INR  -INR 1.67 today.  -Home when INR is therapeutic.     #Essential hypertension  -Lisinopril being held, continue metoprolol.   blood pressure stable.     #Mild hyponatremia  -Monitor daily BMP  -Improving     · heparin gtt and coumadin for DVT prophylaxis.  · Full code.  · Discussed with patient  · Anticipate discharge to be determined; home when INR therapeutic, likely home tomorrow if INR is therapeutic.        Taniya ABDULLAHI  MD Yu  [facility]  01/09/22  10:32 EST

## 2022-01-09 NOTE — PLAN OF CARE
Goal Outcome Evaluation:  Plan of Care Reviewed With: patient        Progress: improving  Outcome Summary: VSS. Continues on Heparin gtt, PTT therapeutic. Pt ambulates ad scarlett around unit. No acute changes over night. Pt eager to get to go home.      Problem: Adult Inpatient Plan of Care  Goal: Absence of Hospital-Acquired Illness or Injury  Intervention: Prevent and Manage VTE (venous thromboembolism) Risk  Recent Flowsheet Documentation  Taken 1/9/2022 0030 by Merle Oconnor RN  VTE Prevention/Management:   bilateral   dorsiflexion/plantar flexion performed  Taken 1/8/2022 2120 by Merle Oconnor RN  VTE Prevention/Management: (Heparin, Coumadin)   bilateral   dorsiflexion/plantar flexion performed     Problem: Airway Clearance Ineffective  Goal: Effective Airway Clearance  Outcome: Ongoing, Progressing  Intervention: Promote Airway Secretion Clearance  Recent Flowsheet Documentation  Taken 1/9/2022 0030 by Merle Oconnor, RN  Activity Management: up ad scarlett  Cough And Deep Breathing: done independently per patient  Taken 1/8/2022 2120 by Merle cOonnor, RN  Activity Management: up ad scarlett  Cough And Deep Breathing: done independently per patient  Taken 1/8/2022 2040 by Merle Oconnor, RN  Activity Management: ambulated in [place]  Taken 1/8/2022 1920 by Merle Oconnor, RN  Activity Management: ambulated in [place]

## 2022-01-10 ENCOUNTER — APPOINTMENT (OUTPATIENT)
Dept: GENERAL RADIOLOGY | Facility: HOSPITAL | Age: 67
End: 2022-01-10

## 2022-01-10 ENCOUNTER — APPOINTMENT (OUTPATIENT)
Dept: CT IMAGING | Facility: HOSPITAL | Age: 67
End: 2022-01-10

## 2022-01-10 LAB
ABO GROUP BLD: NORMAL
ALBUMIN SERPL-MCNC: 2.9 G/DL (ref 3.5–5.2)
ALBUMIN/GLOB SERPL: 1.2 G/DL
ALP SERPL-CCNC: 58 U/L (ref 39–117)
ALT SERPL W P-5'-P-CCNC: 22 U/L (ref 1–41)
ANION GAP SERPL CALCULATED.3IONS-SCNC: 8.6 MMOL/L (ref 5–15)
APTT PPP: 37.6 SECONDS (ref 22.7–35.4)
APTT PPP: 39.3 SECONDS (ref 22.7–35.4)
AST SERPL-CCNC: 22 U/L (ref 1–40)
BASOPHILS # BLD AUTO: 0.05 10*3/MM3 (ref 0–0.2)
BASOPHILS NFR BLD AUTO: 0.3 % (ref 0–1.5)
BILIRUB SERPL-MCNC: 0.9 MG/DL (ref 0–1.2)
BLD GP AB SCN SERPL QL: NEGATIVE
BUN SERPL-MCNC: 14 MG/DL (ref 8–23)
BUN/CREAT SERPL: 16.1 (ref 7–25)
CALCIUM SPEC-SCNC: 8.1 MG/DL (ref 8.6–10.5)
CHLORIDE SERPL-SCNC: 96 MMOL/L (ref 98–107)
CO2 SERPL-SCNC: 24.4 MMOL/L (ref 22–29)
CREAT SERPL-MCNC: 0.87 MG/DL (ref 0.76–1.27)
DEPRECATED RDW RBC AUTO: 44.7 FL (ref 37–54)
DEPRECATED RDW RBC AUTO: 44.7 FL (ref 37–54)
EOSINOPHIL # BLD AUTO: 0.25 10*3/MM3 (ref 0–0.4)
EOSINOPHIL NFR BLD AUTO: 1.7 % (ref 0.3–6.2)
ERYTHROCYTE [DISTWIDTH] IN BLOOD BY AUTOMATED COUNT: 13 % (ref 12.3–15.4)
ERYTHROCYTE [DISTWIDTH] IN BLOOD BY AUTOMATED COUNT: 13.1 % (ref 12.3–15.4)
GFR SERPL CREATININE-BSD FRML MDRD: 88 ML/MIN/1.73
GLOBULIN UR ELPH-MCNC: 2.5 GM/DL
GLUCOSE BLDC GLUCOMTR-MCNC: 157 MG/DL (ref 70–130)
GLUCOSE BLDC GLUCOMTR-MCNC: 179 MG/DL (ref 70–130)
GLUCOSE BLDC GLUCOMTR-MCNC: 195 MG/DL (ref 70–130)
GLUCOSE SERPL-MCNC: 173 MG/DL (ref 65–99)
HCT VFR BLD AUTO: 22.5 % (ref 37.5–51)
HCT VFR BLD AUTO: 23.8 % (ref 37.5–51)
HCT VFR BLD AUTO: 24.5 % (ref 37.5–51)
HGB BLD-MCNC: 7.5 G/DL (ref 13–17.7)
HGB BLD-MCNC: 7.9 G/DL (ref 13–17.7)
HGB BLD-MCNC: 8.1 G/DL (ref 13–17.7)
IMM GRANULOCYTES # BLD AUTO: 0.1 10*3/MM3 (ref 0–0.05)
IMM GRANULOCYTES NFR BLD AUTO: 0.7 % (ref 0–0.5)
INR PPP: 1.98 (ref 0.9–1.1)
LYMPHOCYTES # BLD AUTO: 0.77 10*3/MM3 (ref 0.7–3.1)
LYMPHOCYTES NFR BLD AUTO: 5.3 % (ref 19.6–45.3)
MAGNESIUM SERPL-MCNC: 1.9 MG/DL (ref 1.6–2.4)
MCH RBC QN AUTO: 31.6 PG (ref 26.6–33)
MCH RBC QN AUTO: 32.4 PG (ref 26.6–33)
MCHC RBC AUTO-ENTMCNC: 33.3 G/DL (ref 31.5–35.7)
MCHC RBC AUTO-ENTMCNC: 34 G/DL (ref 31.5–35.7)
MCV RBC AUTO: 94.9 FL (ref 79–97)
MCV RBC AUTO: 95.2 FL (ref 79–97)
MONOCYTES # BLD AUTO: 0.94 10*3/MM3 (ref 0.1–0.9)
MONOCYTES NFR BLD AUTO: 6.5 % (ref 5–12)
NEUTROPHILS NFR BLD AUTO: 12.35 10*3/MM3 (ref 1.7–7)
NEUTROPHILS NFR BLD AUTO: 85.5 % (ref 42.7–76)
NRBC BLD AUTO-RTO: 0.1 /100 WBC (ref 0–0.2)
PLATELET # BLD AUTO: 360 10*3/MM3 (ref 140–450)
PLATELET # BLD AUTO: 392 10*3/MM3 (ref 140–450)
PMV BLD AUTO: 8.8 FL (ref 6–12)
PMV BLD AUTO: 9 FL (ref 6–12)
POTASSIUM SERPL-SCNC: 4.7 MMOL/L (ref 3.5–5.2)
PROT SERPL-MCNC: 5.4 G/DL (ref 6–8.5)
PROTHROMBIN TIME: 22.3 SECONDS (ref 11.7–14.2)
RBC # BLD AUTO: 2.37 10*6/MM3 (ref 4.14–5.8)
RBC # BLD AUTO: 2.5 10*6/MM3 (ref 4.14–5.8)
RH BLD: POSITIVE
SODIUM SERPL-SCNC: 129 MMOL/L (ref 136–145)
T&S EXPIRATION DATE: NORMAL
TROPONIN T SERPL-MCNC: <0.01 NG/ML (ref 0–0.03)
WBC NRBC COR # BLD: 14.46 10*3/MM3 (ref 3.4–10.8)
WBC NRBC COR # BLD: 16.13 10*3/MM3 (ref 3.4–10.8)

## 2022-01-10 PROCEDURE — 86900 BLOOD TYPING SEROLOGIC ABO: CPT

## 2022-01-10 PROCEDURE — 86900 BLOOD TYPING SEROLOGIC ABO: CPT | Performed by: INTERNAL MEDICINE

## 2022-01-10 PROCEDURE — 86850 RBC ANTIBODY SCREEN: CPT | Performed by: INTERNAL MEDICINE

## 2022-01-10 PROCEDURE — 82962 GLUCOSE BLOOD TEST: CPT

## 2022-01-10 PROCEDURE — 92950 HEART/LUNG RESUSCITATION CPR: CPT

## 2022-01-10 PROCEDURE — 71045 X-RAY EXAM CHEST 1 VIEW: CPT

## 2022-01-10 PROCEDURE — 85610 PROTHROMBIN TIME: CPT | Performed by: INTERNAL MEDICINE

## 2022-01-10 PROCEDURE — 83735 ASSAY OF MAGNESIUM: CPT | Performed by: INTERNAL MEDICINE

## 2022-01-10 PROCEDURE — P9016 RBC LEUKOCYTES REDUCED: HCPCS

## 2022-01-10 PROCEDURE — 80053 COMPREHEN METABOLIC PANEL: CPT | Performed by: INTERNAL MEDICINE

## 2022-01-10 PROCEDURE — 85025 COMPLETE CBC W/AUTO DIFF WBC: CPT | Performed by: INTERNAL MEDICINE

## 2022-01-10 PROCEDURE — 32551 INSERTION OF CHEST TUBE: CPT | Performed by: THORACIC SURGERY (CARDIOTHORACIC VASCULAR SURGERY)

## 2022-01-10 PROCEDURE — 85018 HEMOGLOBIN: CPT | Performed by: THORACIC SURGERY (CARDIOTHORACIC VASCULAR SURGERY)

## 2022-01-10 PROCEDURE — 0W9930Z DRAINAGE OF RIGHT PLEURAL CAVITY WITH DRAINAGE DEVICE, PERCUTANEOUS APPROACH: ICD-10-PCS | Performed by: THORACIC SURGERY (CARDIOTHORACIC VASCULAR SURGERY)

## 2022-01-10 PROCEDURE — 94799 UNLISTED PULMONARY SVC/PX: CPT

## 2022-01-10 PROCEDURE — 36430 TRANSFUSION BLD/BLD COMPNT: CPT

## 2022-01-10 PROCEDURE — 86901 BLOOD TYPING SEROLOGIC RH(D): CPT

## 2022-01-10 PROCEDURE — 86901 BLOOD TYPING SEROLOGIC RH(D): CPT | Performed by: INTERNAL MEDICINE

## 2022-01-10 PROCEDURE — 93005 ELECTROCARDIOGRAM TRACING: CPT | Performed by: INTERNAL MEDICINE

## 2022-01-10 PROCEDURE — 86923 COMPATIBILITY TEST ELECTRIC: CPT

## 2022-01-10 PROCEDURE — 74176 CT ABD & PELVIS W/O CONTRAST: CPT

## 2022-01-10 PROCEDURE — 71250 CT THORAX DX C-: CPT

## 2022-01-10 PROCEDURE — 94761 N-INVAS EAR/PLS OXIMETRY MLT: CPT

## 2022-01-10 PROCEDURE — 85730 THROMBOPLASTIN TIME PARTIAL: CPT | Performed by: INTERNAL MEDICINE

## 2022-01-10 PROCEDURE — 86927 PLASMA FRESH FROZEN: CPT

## 2022-01-10 PROCEDURE — 99024 POSTOP FOLLOW-UP VISIT: CPT | Performed by: NURSE PRACTITIONER

## 2022-01-10 PROCEDURE — 85730 THROMBOPLASTIN TIME PARTIAL: CPT | Performed by: STUDENT IN AN ORGANIZED HEALTH CARE EDUCATION/TRAINING PROGRAM

## 2022-01-10 PROCEDURE — 84484 ASSAY OF TROPONIN QUANT: CPT | Performed by: INTERNAL MEDICINE

## 2022-01-10 PROCEDURE — 93005 ELECTROCARDIOGRAM TRACING: CPT | Performed by: STUDENT IN AN ORGANIZED HEALTH CARE EDUCATION/TRAINING PROGRAM

## 2022-01-10 PROCEDURE — P9017 PLASMA 1 DONOR FRZ W/IN 8 HR: HCPCS

## 2022-01-10 PROCEDURE — 85027 COMPLETE CBC AUTOMATED: CPT | Performed by: INTERNAL MEDICINE

## 2022-01-10 PROCEDURE — P9040 RBC LEUKOREDUCED IRRADIATED: HCPCS

## 2022-01-10 PROCEDURE — 85014 HEMATOCRIT: CPT | Performed by: THORACIC SURGERY (CARDIOTHORACIC VASCULAR SURGERY)

## 2022-01-10 PROCEDURE — 5A12012 PERFORMANCE OF CARDIAC OUTPUT, SINGLE, MANUAL: ICD-10-PCS | Performed by: INTERNAL MEDICINE

## 2022-01-10 RX ORDER — MORPHINE SULFATE 2 MG/ML
2 INJECTION, SOLUTION INTRAMUSCULAR; INTRAVENOUS EVERY 4 HOURS PRN
Status: DISCONTINUED | OUTPATIENT
Start: 2022-01-10 | End: 2022-01-19 | Stop reason: HOSPADM

## 2022-01-10 RX ORDER — SODIUM CHLORIDE, SODIUM LACTATE, POTASSIUM CHLORIDE, CALCIUM CHLORIDE 600; 310; 30; 20 MG/100ML; MG/100ML; MG/100ML; MG/100ML
75 INJECTION, SOLUTION INTRAVENOUS CONTINUOUS
Status: DISCONTINUED | OUTPATIENT
Start: 2022-01-10 | End: 2022-01-11

## 2022-01-10 RX ADMIN — Medication 3 MG: at 01:20

## 2022-01-10 RX ADMIN — SODIUM CHLORIDE, POTASSIUM CHLORIDE, SODIUM LACTATE AND CALCIUM CHLORIDE 1000 ML: 600; 310; 30; 20 INJECTION, SOLUTION INTRAVENOUS at 11:41

## 2022-01-10 RX ADMIN — SODIUM CHLORIDE, POTASSIUM CHLORIDE, SODIUM LACTATE AND CALCIUM CHLORIDE 75 ML/HR: 600; 310; 30; 20 INJECTION, SOLUTION INTRAVENOUS at 12:45

## 2022-01-10 RX ADMIN — SODIUM CHLORIDE 250 ML: 9 INJECTION, SOLUTION INTRAVENOUS at 02:24

## 2022-01-10 RX ADMIN — ACETAMINOPHEN 650 MG: 325 TABLET, FILM COATED ORAL at 16:40

## 2022-01-10 NOTE — NURSING NOTE
"Paged cardio to report CXR results. Pt still symptomatic reporting no relief in SOA and chest tightness, O2 applied for comfort, pt \"feels so weak\", BP trending down again, RRT called. Pt reported sudden nausea, HOB raised and pt had brief seizure like activity, HR down to 40s and became pulseless, code called- refer to code notes. Pt regained consciousness and asked this nurse to notify spouse. Spouse updated on situation. Pt being transferred to higher level of care. Bedside report given in CCU.   "

## 2022-01-10 NOTE — NURSING NOTE
"Pt called out to nurse reporting feeling increasingly weak, chest tightness, SOA, dizzy and \"not well\". Nurse found pt sitting up in chair by bed, BP 84/64 HR 57. Pt normally up ad scarlett, assisted back to bed x2 assist for safety, placed supine and legs elevated. Pt diaphoretic, pale and very fatigued. S/w Pilar Arenas (cardio) about pt status, see orders. Pt tolerated 250mL NS bolus, BP trending up.   "

## 2022-01-10 NOTE — PROGRESS NOTES
"    Chief Complaint: hemothorax  S/P: emergent chest tube insertion  POD # 0    Subjective:  Symptoms:  He reports chest pain, weakness and anorexia.    Diet:  NPO.  He reports  nausea.  No vomiting.    Activity level: Impaired due to weakness.    Pain:  He complains of pain that is mild.        Vital Signs:  Temp:  [97.8 °F (36.6 °C)-99.5 °F (37.5 °C)] 97.8 °F (36.6 °C)  Heart Rate:  [] 85  Resp:  [10-20] 10  BP: ()/(52-76) 115/62    Intake & Output (last day)       01/09 0701  01/10 0700 01/10 0701  01/11 0700    P.O. 370     Total Intake(mL/kg) 370 (3.6)     Stool 0     Chest Tube 2300 400    Total Output 2300 400    Net -1930 -400          Stool Unmeasured Occurrence 3 x           Objective:  General Appearance:  Ill-appearing and in no acute distress.    Vital signs: (most recent): Blood pressure 115/62, pulse 85, temperature 97.8 °F (36.6 °C), temperature source Oral, resp. rate 10, height 177.8 cm (70\"), weight 104 kg (228 lb 4.8 oz), SpO2 98 %.  No fever.  (Hypotensive).    Output: Producing urine.    HEENT: (Patient is on 4 L nasal cannula)    Lungs:  Normal effort and normal respiratory rate.  He is not in respiratory distress.  There are decreased breath sounds.    Heart: Tachycardia.  Regular rhythm.    Chest: Chest wall tenderness present.    Abdomen: (Right flank ecchymosis is improving).  Hypoactive bowel sounds.   There is generalized tenderness.     Pulses: Distal pulses are intact.    Neurological: Patient is alert and oriented to person, place and time.    Skin:  Warm and dry.              Chest tube:   Site: Right, Clean, Dry and Securement device intact  Suction: -20 cm  Air Leak: negative  Level: 730cc  24 Hour Total: 2830cc    Results Review:     I reviewed the patient's new clinical results.  I reviewed the patient's new imaging results and agree with the interpretation.  I reviewed the patient's other test results and agree with the interpretation  Discussed with patient, RN, Dr. " Urszula and Dr. Crowe.    Imaging Results (Last 24 Hours)     Procedure Component Value Units Date/Time    XR Chest 1 View [[id number]] Collected: 01/10/22 0803     Updated: 01/10/22 0809    Narrative:      PORTABLE CHEST X-RAY     HISTORY: Chest tube management.     TECHNIQUE: Portable chest x-ray is provided and correlated with multiple  recent prior chest x-rays and CT scans.     FINDINGS: There are sternal wires and new external cardiac pacing pads  as well as a new right chest tube. The right hemothorax is significantly  smaller in volume, with residual pleural fluid filling the caudad 25-30%  of the volume of the right hemithorax. There is no midline shift or  pneumothorax. The left costophrenic sulcus is dry. Vascular volume is  normal.       Impression:      New right chest tube with diminished volume of the  previously seen right pleural effusion.     This report was finalized on 1/10/2022 8:05 AM by Dr. Randy Singh M.D.       CT Chest Without Contrast Diagnostic [[id number]] Collected: 01/10/22 0508     Updated: 01/10/22 0528    Narrative:      CT OF THE CHEST WITHOUT CONTRAST; CT OF THE ABDOMEN AND PELVIS WITHOUT  CONTRAST     HISTORY: Worsening hemothorax     COMPARISON: 01/01/2022     TECHNIQUE: Axial CT imaging was obtained from the thoracic inlet to the  symphysis pubis. No IV contrast was administered.     FINDINGS:  CT OF THE CHEST: Previously identified loculated right pleural effusion  has significantly increased in size. Multifocal areas of increased  attenuation are seen within it, as this is for areas of clot. The amount  of higher attenuation material has increased when compared to the prior  study. There is some mediastinal shift to the left, as well as some  narrowing of the right mainstem bronchus and compared to prior study.  There is some patchy consolidation within the right upper lobe, as well  as significant atelectasis of the right lower lobe and right middle  lobe. Thyroid  gland and esophagus are within normal limits. The patient  is status post aortic valve replacement. There are coronary artery  calcifications. Thoracic aorta is normal in caliber. There is no  evidence of dissection. There is some mild atelectasis noted on the  left. No acute rib fractures are seen.     CT OF THE ABDOMEN AND PELVIS: On prior study, patient was noted to have  a large amount of hemorrhage into the right lateral abdominal wall.  Similar findings are present on the current study. This hemorrhage  appears mildly improved when compared to prior study. No focal hepatic  lesions are seen. Stomach, duodenum, adrenal glands, and spleen appear  unremarkable. Pancreas is atrophic. Gallbladder is within normal limits.  No hydronephrosis is seen on either side. There is a left renal cyst.  There is calcification of the aorta. There is no evidence of bowel  obstruction. Urinary bladder and prostate gland are within normal  limits. Patient does have increased stool burden within the colon, which  may reflect some constipation. There is diverticulosis. There is a  fat-containing umbilical hernia. There is no evidence of appendicitis.  No acute osseous abnormalities are seen.       Impression:         1. Previously identified right-sided hemothorax has increased  significantly in size. Areas of acute appearing hemorrhage within it  have also increased. It is associated with some mediastinal shift to the  left. There is mass effect upon the right mainstem bronchus. There is  significant atelectasis throughout the right lung. There is some  consolidation within the right upper lobe, and correlation with any  evidence of pneumonia is recommended.  2. On prior exam, the patient was noted to have extensive hemorrhage  throughout the right lateral abdominal wall. This has improved when  compared to the prior study. No intra-abdominal hemorrhage is seen.     Radiation dose reduction techniques were utilized, including  automated  exposure control and exposure modulation based on body size.     This report was finalized on 1/10/2022 5:25 AM by Dr. Winifred Woods M.D.       CT Abdomen Pelvis Without Contrast [[id number]] Collected: 01/10/22 0508     Updated: 01/10/22 0528    Narrative:      CT OF THE CHEST WITHOUT CONTRAST; CT OF THE ABDOMEN AND PELVIS WITHOUT  CONTRAST     HISTORY: Worsening hemothorax     COMPARISON: 01/01/2022     TECHNIQUE: Axial CT imaging was obtained from the thoracic inlet to the  symphysis pubis. No IV contrast was administered.     FINDINGS:  CT OF THE CHEST: Previously identified loculated right pleural effusion  has significantly increased in size. Multifocal areas of increased  attenuation are seen within it, as this is for areas of clot. The amount  of higher attenuation material has increased when compared to the prior  study. There is some mediastinal shift to the left, as well as some  narrowing of the right mainstem bronchus and compared to prior study.  There is some patchy consolidation within the right upper lobe, as well  as significant atelectasis of the right lower lobe and right middle  lobe. Thyroid gland and esophagus are within normal limits. The patient  is status post aortic valve replacement. There are coronary artery  calcifications. Thoracic aorta is normal in caliber. There is no  evidence of dissection. There is some mild atelectasis noted on the  left. No acute rib fractures are seen.     CT OF THE ABDOMEN AND PELVIS: On prior study, patient was noted to have  a large amount of hemorrhage into the right lateral abdominal wall.  Similar findings are present on the current study. This hemorrhage  appears mildly improved when compared to prior study. No focal hepatic  lesions are seen. Stomach, duodenum, adrenal glands, and spleen appear  unremarkable. Pancreas is atrophic. Gallbladder is within normal limits.  No hydronephrosis is seen on either side. There is a left renal  cyst.  There is calcification of the aorta. There is no evidence of bowel  obstruction. Urinary bladder and prostate gland are within normal  limits. Patient does have increased stool burden within the colon, which  may reflect some constipation. There is diverticulosis. There is a  fat-containing umbilical hernia. There is no evidence of appendicitis.  No acute osseous abnormalities are seen.       Impression:         1. Previously identified right-sided hemothorax has increased  significantly in size. Areas of acute appearing hemorrhage within it  have also increased. It is associated with some mediastinal shift to the  left. There is mass effect upon the right mainstem bronchus. There is  significant atelectasis throughout the right lung. There is some  consolidation within the right upper lobe, and correlation with any  evidence of pneumonia is recommended.  2. On prior exam, the patient was noted to have extensive hemorrhage  throughout the right lateral abdominal wall. This has improved when  compared to the prior study. No intra-abdominal hemorrhage is seen.     Radiation dose reduction techniques were utilized, including automated  exposure control and exposure modulation based on body size.     This report was finalized on 1/10/2022 5:25 AM by Dr. Winifred Woods M.D.       XR Chest 1 View [[id number]] Collected: 01/10/22 0326     Updated: 01/10/22 0334    Narrative:      SINGLE VIEW OF THE CHEST     HISTORY: Chest tightness and shortness of air     COMPARISON: June 2022     FINDINGS:  There is been significant worsening of opacification of the right  hemithorax when compared to prior study. There is some mild deviation of  trachea to the left, suggesting that this is due to increasing pleural  fluid, which appears to be loculated. There may also be some increasing  atelectasis at the right lung base. Left lung remains clear. No  pneumothorax is seen.       Impression:      Near complete opacification of  the right hemithorax, favored to be  secondary to a large, likely loculated right pleural effusion. I cannot  exclude some increasing atelectasis at the right lung base.     FINDINGS were called to the patient's nurse at 3:29 AM.     This report was finalized on 1/10/2022 3:31 AM by Dr. Winifred Woods M.D.             Lab Results:     Lab Results (last 24 hours)     Procedure Component Value Units Date/Time    POC Glucose Once [[id number]]  (Abnormal) Collected: 01/10/22 0841    Specimen: Blood Updated: 01/10/22 0855     Glucose 179 mg/dL      Comment: Meter: [id number] : [id number] Talat CORNEJO       aPTT [[id number]]  (Abnormal) Collected: 01/10/22 0537    Specimen: Blood Updated: 01/10/22 0653     PTT 39.3 seconds     Protime-INR [[id number]]  (Abnormal) Collected: 01/10/22 0537    Specimen: Blood Updated: 01/10/22 0653     Protime 22.3 Seconds      INR 1.98    aPTT [[id number]]  (Abnormal) Collected: 01/10/22 0537    Specimen: Blood from Arm, Left Updated: 01/10/22 0649     PTT 37.6 seconds     CBC (No Diff) [[id number]]  (Abnormal) Collected: 01/10/22 0537    Specimen: Blood Updated: 01/10/22 0558     WBC 16.13 10*3/mm3      RBC 2.37 10*6/mm3      Hemoglobin 7.5 g/dL      Hematocrit 22.5 %      MCV 94.9 fL      MCH 31.6 pg      MCHC 33.3 g/dL      RDW 13.1 %      RDW-SD 44.7 fl      MPV 9.0 fL      Platelets 392 10*3/mm3     Comprehensive Metabolic Panel [[id number]]  (Abnormal) Collected: 01/10/22 0257    Specimen: Blood Updated: 01/10/22 0333     Glucose 173 mg/dL      BUN 14 mg/dL      Creatinine 0.87 mg/dL      Sodium 129 mmol/L      Potassium 4.7 mmol/L      Chloride 96 mmol/L      CO2 24.4 mmol/L      Calcium 8.1 mg/dL      Total Protein 5.4 g/dL      Albumin 2.90 g/dL      ALT (SGPT) 22 U/L      AST (SGOT) 22 U/L      Alkaline Phosphatase 58 U/L      Total Bilirubin 0.9 mg/dL      eGFR Non African Amer 88 mL/min/1.73      Globulin 2.5 gm/dL      A/G Ratio 1.2 g/dL      BUN/Creatinine Ratio 16.1      Anion Gap 8.6 mmol/L     Narrative:      GFR Normal >60  Chronic Kidney Disease <60  Kidney Failure <15      Troponin [[id number]]  (Normal) Collected: 01/10/22 0257    Specimen: Blood Updated: 01/10/22 0333     Troponin T <0.010 ng/mL     Narrative:      Troponin T Reference Range:  <= 0.03 ng/mL-   Negative for AMI  >0.03 ng/mL-     Abnormal for myocardial necrosis.  Clinicians would have to utilize clinical acumen, EKG, Troponin and serial changes to determine if it is an Acute Myocardial Infarction or myocardial injury due to an underlying chronic condition.       Results may be falsely decreased if patient taking Biotin.      Magnesium [[id number]]  (Normal) Collected: 01/10/22 0257    Specimen: Blood Updated: 01/10/22 0333     Magnesium 1.9 mg/dL     CBC & Differential [[id number]]  (Abnormal) Collected: 01/10/22 0257    Specimen: Blood Updated: 01/10/22 0308    Narrative:      The following orders were created for panel order CBC & Differential.  Procedure                               Abnormality         Status                     ---------                               -----------         ------                     CBC Auto Differential[[id number]]        Abnormal            Final result                 Please view results for these tests on the individual orders.    CBC Auto Differential [[id number]]  (Abnormal) Collected: 01/10/22 0257    Specimen: Blood Updated: 01/10/22 0308     WBC 14.46 10*3/mm3      RBC 2.50 10*6/mm3      Hemoglobin 8.1 g/dL      Hematocrit 23.8 %      MCV 95.2 fL      MCH 32.4 pg      MCHC 34.0 g/dL      RDW 13.0 %      RDW-SD 44.7 fl      MPV 8.8 fL      Platelets 360 10*3/mm3      Neutrophil % 85.5 %      Lymphocyte % 5.3 %      Monocyte % 6.5 %      Eosinophil % 1.7 %      Basophil % 0.3 %      Immature Grans % 0.7 %      Neutrophils, Absolute 12.35 10*3/mm3      Lymphocytes, Absolute 0.77 10*3/mm3      Monocytes, Absolute 0.94 10*3/mm3      Eosinophils, Absolute 0.25 10*3/mm3       Basophils, Absolute 0.05 10*3/mm3      Immature Grans, Absolute 0.10 10*3/mm3      nRBC 0.1 /100 WBC     POC Glucose Once [[id number]]  (Abnormal) Collected: 01/10/22 0205    Specimen: Blood Updated: 01/10/22 0206     Glucose 195 mg/dL      Comment: Meter: [id number] : [id number] Vero Tan JAME       aPTT [[id number]]  (Abnormal) Collected: 01/09/22 2314    Specimen: Blood from Arm, Left Updated: 01/09/22 2353     PTT 90.0 seconds     aPTT [[id number]]  (Abnormal) Collected: 01/09/22 1526    Specimen: Blood from Arm, Left Updated: 01/09/22 1652     PTT 62.7 seconds            Assessment/Plan       Hemothorax    Abdominal wall hematoma    Mechanical heart valve present    Essential hypertension       Assessment & Plan     Called back to see patient due to hemothorax, s/p code blue with severe bradycardia and loss of consciousness requiring two rounds of CPR.    I independently reviewed CT of the chest performed earlier this morning which demonstrates significant increase in right hemothorax with some mediastinal shift to the left.  There is consolidation within the right upper lobe.  There is improvement in abdominal wall hemorrhage compared to previous imaging.  No intra-abdominal hemorrhage is present.    Patient is alert and oriented.  He had abdominal wall hematoma on admission and a hemothorax. He underwent thoracentesis on 1/3/21 with 500cc bloody fluid drained from his right chest. Patient has a mechanical valve and was being transitioned from heparin gtt back to warfarin after pleural fluid did not reaccumlate.  Dr. Crowe placed a chest tube emergently at bedside today. He's already had over 2800cc of bloody fluid drained from this. Most recent Hgb is 7.5. I've ordered a stat H&H and have ordered two units PRBCs stat. We will give 2 units FFP and order serial H&H q 6.  Blood appears old and likely just evacuation of the large hemothorax as the output has slowed.  Keep NPO for now.  Will ask nurse to  document chest tube output hourly and call if > 150cc/hr over two consecutive hours.    DWAYNE Martin  [facility]  01/10/22  11:21 EST    Patient was seen and assessed while wearing personal protective equipment including facemask, protective eyewear and gloves.  Hand hygiene performed prior to entering the room and upon exiting with doffing of gloves.

## 2022-01-10 NOTE — PROGRESS NOTES
Han Seaman MD                          [phone]      Patient ID:    Name:  Claudio Krishna    MRN:  [id number]    1955   66 y.o.  male            Patient Care Team:  Shayan Celeste MD as PCP - General (Family Medicine)    CC/ Reason for visit: Acute respiratory failure, spontaneous right abdominal wall hematoma thought to be from coughing, worsening right hemothorax, cardiac arrest    Subjective: Pt seen and examined this AM.  Acute overnight events noted.  Patient had to be resuscitated after having sudden onset bradycardia and loss of consciousness and cardiac arrest.  He states that he did not have any worsening shortness of breath prior and just took a sleeping pill and felt overall normal.  Patient now has large right hemothorax and underwent a right-sided chest tube placement and had more than 2 L of blood and now has dark blood in the chest.  Thoracic surgery is on board and I discussed with Dr. Crowe and the plan to hold off on intervention for now and continue to monitor closely    ROS: Complains of some chest soreness with CPR but otherwise no nausea vomiting or diarrhea.  Denies any palpitations    Objective     Vital Signs past 24hrs    BP range: BP: ()/(52-76) 115/62  Pulse range: Heart Rate:  [] 85  Resp rate range: Resp:  [10-20] 10  Temp range: Temp (24hrs), Av.4 °F (36.9 °C), Min:97.8 °F (36.6 °C), Max:99.5 °F (37.5 °C)      Ventilator/Non-Invasive Ventilation Settings (From admission, onward)            None          Device (Oxygen Therapy): nonrebreather mask       104 kg (228 lb 4.8 oz); Body mass index is 32.76 kg/m².      Intake/Output Summary (Last 24 hours) at 1/10/2022 1139  Last data filed at 1/10/2022 0800  Gross per 24 hour   Intake 370 ml   Output 2700 ml   Net -2330 ml       PHYSICAL EXAM   Constitutional: Middle aged pt in bed, No acute respiratory distress, + accessory muscle use  Head: - NCAT  Eyes: No pallor.   Anicteric sclerae, EOMI.  ENMT:  Mallampati 3, no oral thrush. Moist MM.   NECK: Trachea midline, No thyromegaly, no palpable cervical lymphadenopathy  Heart: RRR, no murmur. No pedal edema   Lungs: MAYE +, right-sided chest tube with bloody drainage-copious, decreased breath sounds at the right. no wheezes/ crackles heard    Abdomen: Soft. No tenderness, guarding or rigidity. No palpable masses  Extremities: Extremities warm and well perfused. No cyanosis/ clubbing  Neuro: Conscious, answers appropriately, no gross focal neuro deficits  Psych: Mood and affect appropriate    PPE recommended per [facility] infectious disease Isolation protocol for the current clinical scenario(as mentioned below) was followed.     Scheduled meds:  atorvastatin, 10 mg, Oral, Daily  docusate sodium, 100 mg, Oral, BID  lactated ringers, 1,000 mL, Intravenous, Once  polyethylene glycol, 17 g, Oral, Daily        IV meds:                      heparin, 12 Units/kg/hr, Last Rate: 13 Units/kg/hr (01/10/22 0001)  hold, 1 each        Data Review:      Results from last 7 days   Lab Units 01/10/22  0537 01/10/22  0257 01/09/22  0601 01/08/22  0556 01/08/22  0556 01/07/22  0558 01/06/22  0509 01/05/22  0439 01/05/22  0439 01/04/22  0619 01/03/22  2323   SODIUM mmol/L  --  129* 133*  --  133*   < > 131*   < > 131*   < >  --    POTASSIUM mmol/L  --  4.7 4.7  --  4.8   < > 4.6   < > 4.4   < >  --    CHLORIDE mmol/L  --  96* 98  --  97*   < > 94*   < > 97*   < >  --    CO2 mmol/L  --  24.4 27.0  --  27.9   < > 28.9   < > 24.8   < >  --    BUN mg/dL  --  14 12  --  13   < > 12   < > 12   < >  --    CREATININE mg/dL  --  0.87 0.65*  --  0.68*   < > 0.72*   < > 0.64*   < >  --    CALCIUM mg/dL  --  8.1* 8.5*  --  8.8   < > 8.8   < > 8.3*   < >  --    BILIRUBIN mg/dL  --  0.9  --   --   --   --   --   --   --   --   --    ALK PHOS U/L  --  58  --   --   --   --   --   --   --   --   --    ALT (SGPT) U/L  --  22  --   --   --   --   --   --   --   --    --    AST (SGOT) U/L  --  22  --   --   --   --   --   --   --   --   --    GLUCOSE mg/dL  --  173* 107*  --  105*   < > 119*   < > 118*   < >  --    WBC 10*3/mm3 16.13* 14.46* 7.19   < > 7.33   < > 9.74   < > 9.29   < >  --    HEMOGLOBIN g/dL 7.5* 8.1* 8.6*   < > 8.7*   < > 9.0*   < > 8.5*   < > 8.8*   PLATELETS 10*3/mm3 392 360 338   < > 345   < > 323   < > 239   < >  --    INR  1.98*  --  1.67*  --  1.47*   < > 1.27*   < > 1.31*   < >  --    PROCALCITONIN ng/mL  --   --   --   --   --   --  0.06  --  0.04  --  0.06    < > = values in this interval not displayed.       Lab Results   Component Value Date    CALCIUM 8.1 (L) 01/10/2022    PHOS 3.2 01/03/2022       Results from last 7 days   Lab Units 01/03/22  1148   BODYFLDCX  No growth at 5 days              Results Review:    I have reviewed the relevant laboratory results and independently reviewed the chest imaging from this hospitalization including the available echocardiogram reports personally and summarized it if/ when appropriate below    Assessment    Resuscitated PEA arrest; 1/10  Acute hypoxic respiratory failure  Right hemothorax s/p emergent Rt CT 1/10    Acute blood loss anemia s/p PRBC  Hypovolemic hypotension  Abdominal wall hematoma; improved -spontaneous  Coumadin coagulopathy  Mechanical aortic valve s/p full AC; held now  Hyponatremia    PLAN:  Events from overnight noted.  Patient had to be transferred to ICU post cardiac arrest after having sudden onset weakness and eventual bradycardia.  Denies any worsening shortness of breath.  Noted to have large right hemothorax requiring urgent chest tube placement with more than 2 L of bloody drainage.  Awaiting further input from thoracic surgery regarding need for surgical intervention.  Continue with close monitoring hemodynamically.  We will continue with volume resuscitation for now  Repeat hemoglobin check and transfuse to keep hemoglobin greater than 7  Discussed with Dr. Posada and the  recommend to hold all anticoagulation.  Did not want us to use vitamin K.  Risk of mechanical valve thrombosis explained to the patient.  Continue to wean supplemental oxygen suspect atelectasis playing a role  Hyponatremia noted likely from above and will continue to monitor closely  Guarded prognosis  Full code    Discussed with the patient, wife and consultants a few times as well as nurse and and multidisciplinary round    Cc - 56 mins    Han Seaman MD  1/10/2022

## 2022-01-10 NOTE — PLAN OF CARE
Goal Outcome Evaluation:  Plan of Care Reviewed With: patient, spouse        Progress: declining       Pt in CCU S/P code and hemothorax. Pt brought down at 0413 after ROSC and CPR given for 2 minutes. Pt alert and oriented X4. VSS with pt on 10L NRB. PT taken for stat CT scan. Dr Prado and Dr. Crowe updated on results. Dr. Crowe inserted CT at bedside. 2300 ml removed. Pt and wife updated on plan of care. Continue to monitor.

## 2022-01-10 NOTE — CONSULTS
[facility]  [phone]  Hubert Prado MD      Subjective   LOS: 8 days     Thank you for this consultation.  I was called by the rapid response team as the patient suffered a code event with severe bradycardia and loss of pulse requiring 2 rounds of CPR.  After return of pulse patient is on nonrebreather to maintain sats about 94%.  Is currently awake and alert and now transferred down to the critical care unit.  He had excessive cough just prior to this admission and developed abdominal wall hematoma and hemothorax.  This was treated nonsurgically though he did have a thoracentesis with 500 cc fluid off.  He had been apparently improving.  He will start a heparin drip and is in the process of getting warfarin.  He has underlying mechanical aortic valve.  Chest x-ray during the code shows large loculated right effusion that is much worse than previous.    Claudio Krishna  reports no history of alcohol use.,  reports that he has never smoked. He does not have any smokeless tobacco history on file.     Past Hx:  has a past medical history of Coronary artery disease and Hypertension.  Surg Hx:  has a past surgical history that includes Aorta surgery (2011); Knee Mini Revision (); and Hernia repair.  FH: family history is not on file.  SH:  reports that he has never smoked. He does not have any smokeless tobacco history on file. He reports that he does not drink alcohol and does not use drugs.    Medications Prior to Admission   Medication Sig Dispense Refill Last Dose   • amoxicillin-clavulanate (AUGMENTIN) 875-125 MG per tablet Take 1 tablet by mouth 2 (Two) Times a Day.      • aspirin 81 MG chewable tablet Chew 81 mg Daily.      • [] azithromycin (Zithromax Z-Ben) 250 MG tablet Take 250 mg by mouth Daily.      • [] benzonatate (Tessalon Perles) 100 MG capsule Take 100 mg by mouth Daily.      • lisinopril (PRINIVIL,ZESTRIL) 10 MG tablet Take 10 mg by mouth Daily.      •  lovastatin (MEVACOR) 40 MG tablet Take 40 mg by mouth Every Night.      • metoprolol succinate XL (TOPROL-XL) 100 MG 24 hr tablet Take 100 mg by mouth Daily.      • Misc Natural Products (OSTEO BI-FLEX/5-LOXIN ADVANCED PO) Take  by mouth.      • warfarin (COUMADIN) 5 MG tablet Take 5 mg by mouth Daily. 5mg WedSat, 7.5mg all other days        No Known Allergies    Review of Systems   Constitutional: Negative for chills and fever.   HENT: Negative for congestion and sore throat.    Respiratory: Positive for shortness of breath. Negative for cough and wheezing.    Gastrointestinal: Negative for abdominal pain, nausea and vomiting.   Genitourinary: Negative for dysuria and hematuria.   Musculoskeletal: Negative for arthralgias and back pain.   Skin: Negative for pallor and rash.   Neurological: Negative for seizures and headaches.   Psychiatric/Behavioral: Negative for agitation and confusion.     Vital Signs past 24hrs  BP range: BP: ()/(57-76) 95/57  Pulse range: Heart Rate:  [] 104  Resp rate range: Resp:  [16-20] 20  Temp range: Temp (24hrs), Av.4 °F (36.9 °C), Min:97.8 °F (36.6 °C), Max:99.5 °F (37.5 °C)    Oxygen range: SpO2:  [95 %-100 %] 100 %; Flow (L/min):  [2-15] 15;   Device (Oxygen Therapy): nonrebreather mask  104 kg (228 lb 4.8 oz); Body mass index is 32.76 kg/m².  I/O this shift:  In: 120 [P.O.:120]  Out: -     Adult male laying in bed.  Looks somewhat anxious.  On nonrebreather.  Not in any severe distress.  Pupils equal and react light.  Oropharynx dry.  JVP not elevated trachea midline thyroid not enlarged.  Lungs reveal bilateral air entry.  Diminished on the right side.  Dull to percussion on the right side and resonant on the left.  No chest wall tenderness.  Heart examination S1-S2 present rhythm regular and currently tachycardic.  No murmurs.  No edema lower extremities.  Mechanical heart valve sounds heard.  Abdomen is soft nontender bowel sounds present no liver spleen  enlargement.  No peripheral cyanosis clubbing.  Abdominal wall hematoma noted.  Moves all 4 extremities sensorimotor intact.  No cervical, axillary, inguinal adenopathy.    Results Review:    I have reviewed the laboratory and imaging data from current admission. My annotations are as noted in assessment and plan.    Medication Review:  I have reviewed the current MAR. My annotations are as noted in assessment and plan.    Plan   PCCM Problems  Acute hypoxic respiratory failure  Right hemothorax with significant enlargement  Abdominal wall hematoma  Anticoagulation for mechanical aortic valve  Currently heparin and warfarin drip  Resuscitated cardiac arrest with bradycardia    Plan of Treatment    Continue supplemental oxygen.  Currently requiring nonrebreather.  See below and likely cause is worsening pleural effusion.    Right hemothorax appears to be significantly enlarged on chest x-ray.  Will send for a stat CT chest and then discussed with thoracic surgery for further action.  I have discontinued his heparin drip, aspirin, warfarin dosing by pharmacy for now.    Resuscitated cardiac arrest with bradycardia.  Currently slightly tachycardic.  I have discontinued his metoprolol.  Cardiology has been consulted and will await their input.  Likely causes worsening hemothorax but may need to be cautious with additional beta-blockers.    Stat repeat CBC has been ordered for 5 AM.  Type and screen sent as a precaution.  3 AM labs reviewed.    Need to monitor very closely for worsening pneumothorax and need for definitive intervention.    I spent 60 mins critical care time in care of this patient outside of any procedures.     Electronically signed by Hubert Prado MD, 01/10/22, 4:32 AM EST.      Part of this note may be an electronic transcription/translation of spoken language to printed text using the Dragon Dictation System.

## 2022-01-10 NOTE — PROGRESS NOTES
Claudio Tomi   66 y.o.  male    LOS: 8 days   Patient Care Team:  Shayan Celeste MD as PCP - General (Family Medicine)      Subjective   Awake and alert today, in ICU  Review of Systems:   The following systems were reviewed and negative;  respiratory and cardiovascular    Medication Review:   Current Facility-Administered Medications:   •  acetaminophen (TYLENOL) tablet 650 mg, 650 mg, Oral, Q4H PRN, Falguni Renee MD  •  aspirin chewable tablet 81 mg, 81 mg, Oral, Daily, Falguni Renee MD, 81 mg at 01/04/22 0841  •  atorvastatin (LIPITOR) tablet 10 mg, 10 mg, Oral, Daily, Falguni Renee MD, 10 mg at 01/04/22 0841  •  benzonatate (TESSALON) capsule 100 mg, 100 mg, Oral, Daily, Falguni Renee MD, 100 mg at 01/04/22 0841  •  heparin (porcine) 5000 UNIT/ML injection 3,100-6,200 Units, 30-60 Units/kg, Intravenous, Q6H PRN, Leyda Rodriguez APRN, 5,000 Units at 01/04/22 0025  •  heparin 30587 units/250 mL (100 units/mL) in 0.45 % NaCl infusion, 12 Units/kg/hr, Intravenous, Titrated, Leyda Rodriguez APRN, Last Rate: 13.39 mL/hr at 01/04/22 0026, 13 Units/kg/hr at 01/04/22 0026  •  Hold medication, 1 each, Does not apply, Continuous PRN, Rafaela Stout, ELI, APRN  •  melatonin tablet 3 mg, 3 mg, Oral, Nightly PRN, Falguni Renee MD, 3 mg at 01/04/22 0029  •  metoprolol succinate XL (TOPROL-XL) 24 hr tablet 100 mg, 100 mg, Oral, Daily, Falguni Renee MD, 100 mg at 01/04/22 0841  •  nitroglycerin (NITROSTAT) SL tablet 0.4 mg, 0.4 mg, Sublingual, Q5 Min PRN, Falguni Renee MD  •  ondansetron (ZOFRAN) tablet 4 mg, 4 mg, Oral, Q6H PRN **OR** ondansetron (ZOFRAN) injection 4 mg, 4 mg, Intravenous, Q6H PRN, Falguni Renee MD  •  [COMPLETED] Insert peripheral IV, , , Once **AND** sodium chloride 0.9 % flush 10 mL, 10 mL, Intravenous, PRN, Dylan Barnhart MD      Objective     Vital Sign Min/Max for last 24 hours  Temp  Min: 97.8 °F (36.6 °C)  Max: 99.5  °F (37.5 °C)   BP  Min: 84/64  Max: 136/69    Pulse  Min: 57  Max: 133         01/08/22  0634 01/09/22  0500 01/10/22  0300   Weight: 100 kg (220 lb 6.4 oz) 102 kg (224 lb) 104 kg (228 lb 4.8 oz)        Intake/Output Summary (Last 24 hours) at 1/10/2022 0832  Last data filed at 1/10/2022 0700  Gross per 24 hour   Intake 370 ml   Output 2300 ml   Net -1930 ml     Physical Exam:      General Appearance:    Well developed and well nourished in no acute distress   Neck:    no JVD   Lungs:     Clear to auscultation bilaterally. No wheezes, rhonchi or rales. No accessory muscle use.     Heart:   Regular rate and rhythm, with normal aortic valve clicks and grade 1/6 aortic murmur.   Abdomen:     Normal bowel sounds, soft non-tender, non-distended   Extremities:   No edema in BLE. BLE warm with no cyanosis   Skin:   Warm and dry   Neurologic:   awake alert and oriented x3, speech clear and approp      Monitor: Reviewed.  Results Review:     I reviewed the patient's new clinical results.    Sodium Sodium   Date Value Ref Range Status   01/10/2022 129 (L) 136 - 145 mmol/L Final   01/09/2022 133 (L) 136 - 145 mmol/L Final   01/08/2022 133 (L) 136 - 145 mmol/L Final      Potassium Potassium   Date Value Ref Range Status   01/10/2022 4.7 3.5 - 5.2 mmol/L Final   01/09/2022 4.7 3.5 - 5.2 mmol/L Final   01/08/2022 4.8 3.5 - 5.2 mmol/L Final      Chloride Chloride   Date Value Ref Range Status   01/10/2022 96 (L) 98 - 107 mmol/L Final   01/09/2022 98 98 - 107 mmol/L Final   01/08/2022 97 (L) 98 - 107 mmol/L Final      Bicarbonate No results found for: PLASMABICARB   BUN BUN   Date Value Ref Range Status   01/10/2022 14 8 - 23 mg/dL Final   01/09/2022 12 8 - 23 mg/dL Final   01/08/2022 13 8 - 23 mg/dL Final      Creatinine Creatinine   Date Value Ref Range Status   01/10/2022 0.87 0.76 - 1.27 mg/dL Final   01/09/2022 0.65 (L) 0.76 - 1.27 mg/dL Final   01/08/2022 0.68 (L) 0.76 - 1.27 mg/dL Final      Calcium Calcium   Date Value Ref  Range Status   01/10/2022 8.1 (L) 8.6 - 10.5 mg/dL Final   01/09/2022 8.5 (L) 8.6 - 10.5 mg/dL Final   01/08/2022 8.8 8.6 - 10.5 mg/dL Final      Magnesium Magnesium   Date Value Ref Range Status   01/10/2022 1.9 1.6 - 2.4 mg/dL Final        Results from last 7 days   Lab Units 01/10/22  0537   WBC 10*3/mm3 16.13*   HEMOGLOBIN g/dL 7.5*   HEMATOCRIT % 22.5*   PLATELETS 10*3/mm3 392       Results from last 7 days   Lab Units 01/10/22  0537 01/09/22  0601 01/08/22  0556   INR  1.98* 1.67* 1.47*       Assessment/Plan   Assessment/ Plan  1. Hemothorax; code on 1/9/2022.  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 right-sided pleural effusion        -s/p R thoracentesis 1/3/21:500cc of bloody fluid removed  4. HTN    Sudden decompensation yesterday, unknown etiology.  INR was 1.98 this morning (1.67).  Discussed with nurse, and with patient's wife.  No clear reason for the decompensation.  Probably a small bleed in the right lung.  Had 2300 cc out.  Hemoglobin is dropped from 8.6 down to 7.5.    Cardiac status is stable, with excellent aortic valve sounds.  Recommend holding all anticoagulation.  Fresh frozen plasma would be okay, but no vitamin K.  Discussed in detail.    Rickie Posada MD  01/10/22  08:32 EST      Time: 19    Dictated portions using Dragon dictation software.     During the entire encounter, I was wearing recommended PPE including face mask and eye protection. Hand sanitization was performed prior to entering room and upon exit.

## 2022-01-10 NOTE — NURSING NOTE
"Pt still c/o \"difficulty catching breath or taking a deep breath\", continues with chest tightness, sinus rhythm on monitor. EKG results given to Pilar Arenas (cardio), new orders for STAT CXR and labs.  "

## 2022-01-10 NOTE — PROCEDURES
Chest Tube Insertion    Date/Time: 1/10/2022 7:22 AM  Performed by: Vishal Crowe III, MD  Authorized by: Vishal Crowe III, MD   Consent: Verbal consent obtained.  Risks and benefits: risks, benefits and alternatives were discussed  Consent given by: patient  Patient understanding: patient states understanding of the procedure being performed  Patient consent: the patient's understanding of the procedure matches consent given  Procedure consent: procedure consent matches procedure scheduled  Relevant documents: relevant documents present and verified  Test results: test results available and properly labeled  Site marked: the operative site was marked  Imaging studies: imaging studies available  Required items: required blood products, implants, devices, and special equipment available  Patient identity confirmed: arm band and verbally with patient  Indications: hemothorax    Sedation:  Patient sedated: no    Anesthesia: local infiltration    Anesthesia:  Local Anesthetic: lidocaine 1% without epinephrine  Anesthetic total: 20 mL  Preparation: skin prepped with Betadine  Placement location: right lateral  Scalpel size: 15  Tube size: 28 [place]  Dissection instrument: Annia clamp  Ultrasound guidance: no  Tension pneumothorax heard: no  Tube connected to: suction  Drainage characteristics: bloody  Drainage amount: 1800 ml  Suture material: 0 silk  Dressing: 4x4 sterile gauze  Post-insertion x-ray findings: tube in good position  Patient tolerance: patient tolerated the procedure well with no immediate complications

## 2022-01-11 ENCOUNTER — ANESTHESIA EVENT (OUTPATIENT)
Dept: PERIOP | Facility: HOSPITAL | Age: 67
End: 2022-01-11

## 2022-01-11 ENCOUNTER — APPOINTMENT (OUTPATIENT)
Dept: GENERAL RADIOLOGY | Facility: HOSPITAL | Age: 67
End: 2022-01-11

## 2022-01-11 LAB
ANION GAP SERPL CALCULATED.3IONS-SCNC: 7.1 MMOL/L (ref 5–15)
BH BB BLOOD EXPIRATION DATE: NORMAL
BH BB BLOOD TYPE BARCODE: 5100
BH BB DISPENSE STATUS: NORMAL
BH BB PRODUCT CODE: NORMAL
BH BB UNIT NUMBER: NORMAL
BUN SERPL-MCNC: 15 MG/DL (ref 8–23)
BUN/CREAT SERPL: 23.4 (ref 7–25)
CALCIUM SPEC-SCNC: 8.5 MG/DL (ref 8.6–10.5)
CHLORIDE SERPL-SCNC: 97 MMOL/L (ref 98–107)
CO2 SERPL-SCNC: 25.9 MMOL/L (ref 22–29)
CREAT SERPL-MCNC: 0.64 MG/DL (ref 0.76–1.27)
CROSSMATCH INTERPRETATION: NORMAL
CROSSMATCH INTERPRETATION: NORMAL
GFR SERPL CREATININE-BSD FRML MDRD: 125 ML/MIN/1.73
GLUCOSE BLDC GLUCOMTR-MCNC: 115 MG/DL (ref 70–130)
GLUCOSE BLDC GLUCOMTR-MCNC: 134 MG/DL (ref 70–130)
GLUCOSE SERPL-MCNC: 124 MG/DL (ref 65–99)
HCT VFR BLD AUTO: 23 % (ref 37.5–51)
HCT VFR BLD AUTO: 23.1 % (ref 37.5–51)
HCT VFR BLD AUTO: 23.3 % (ref 37.5–51)
HCT VFR BLD AUTO: 24.6 % (ref 37.5–51)
HGB BLD-MCNC: 7.7 G/DL (ref 13–17.7)
HGB BLD-MCNC: 7.8 G/DL (ref 13–17.7)
HGB BLD-MCNC: 7.9 G/DL (ref 13–17.7)
HGB BLD-MCNC: 8.2 G/DL (ref 13–17.7)
INR PPP: 2.09 (ref 0.9–1.1)
POTASSIUM SERPL-SCNC: 4.3 MMOL/L (ref 3.5–5.2)
PROTHROMBIN TIME: 23.2 SECONDS (ref 11.7–14.2)
SARS-COV-2 ORF1AB RESP QL NAA+PROBE: NOT DETECTED
SODIUM SERPL-SCNC: 130 MMOL/L (ref 136–145)
UNIT  ABO: NORMAL
UNIT  RH: NORMAL

## 2022-01-11 PROCEDURE — 25010000002 MORPHINE PER 10 MG: Performed by: INTERNAL MEDICINE

## 2022-01-11 PROCEDURE — 85014 HEMATOCRIT: CPT | Performed by: NURSE PRACTITIONER

## 2022-01-11 PROCEDURE — 80048 BASIC METABOLIC PNL TOTAL CA: CPT | Performed by: STUDENT IN AN ORGANIZED HEALTH CARE EDUCATION/TRAINING PROGRAM

## 2022-01-11 PROCEDURE — 85018 HEMOGLOBIN: CPT | Performed by: NURSE PRACTITIONER

## 2022-01-11 PROCEDURE — 99024 POSTOP FOLLOW-UP VISIT: CPT | Performed by: NURSE PRACTITIONER

## 2022-01-11 PROCEDURE — U0004 COV-19 TEST NON-CDC HGH THRU: HCPCS | Performed by: THORACIC SURGERY (CARDIOTHORACIC VASCULAR SURGERY)

## 2022-01-11 PROCEDURE — 71045 X-RAY EXAM CHEST 1 VIEW: CPT

## 2022-01-11 PROCEDURE — 82962 GLUCOSE BLOOD TEST: CPT

## 2022-01-11 PROCEDURE — 85610 PROTHROMBIN TIME: CPT | Performed by: INTERNAL MEDICINE

## 2022-01-11 RX ORDER — HYDROCODONE BITARTRATE AND ACETAMINOPHEN 5; 325 MG/1; MG/1
1 TABLET ORAL EVERY 6 HOURS PRN
Status: ACTIVE | OUTPATIENT
Start: 2022-01-11 | End: 2022-01-18

## 2022-01-11 RX ORDER — SODIUM CHLORIDE 9 MG/ML
75 INJECTION, SOLUTION INTRAVENOUS ONCE
Status: COMPLETED | OUTPATIENT
Start: 2022-01-12 | End: 2022-01-12

## 2022-01-11 RX ADMIN — DOCUSATE SODIUM 100 MG: 100 CAPSULE, LIQUID FILLED ORAL at 08:39

## 2022-01-11 RX ADMIN — SODIUM CHLORIDE, POTASSIUM CHLORIDE, SODIUM LACTATE AND CALCIUM CHLORIDE 75 ML/HR: 600; 310; 30; 20 INJECTION, SOLUTION INTRAVENOUS at 04:10

## 2022-01-11 RX ADMIN — POLYETHYLENE GLYCOL 3350 17 G: 17 POWDER, FOR SOLUTION ORAL at 08:39

## 2022-01-11 RX ADMIN — MORPHINE SULFATE 2 MG: 2 INJECTION, SOLUTION INTRAMUSCULAR; INTRAVENOUS at 23:17

## 2022-01-11 RX ADMIN — ATORVASTATIN CALCIUM 10 MG: 20 TABLET, FILM COATED ORAL at 08:39

## 2022-01-11 NOTE — PROGRESS NOTES
Claudio Tomi   66 y.o.  male    LOS: 9 days   Patient Care Team:  Shayan Celeste MD as PCP - General (Family Medicine)      Subjective   Awake and alert today, in ICU  Review of Systems:   The following systems were reviewed and negative;  respiratory and cardiovascular    Medication Review:   Current Facility-Administered Medications:   •  acetaminophen (TYLENOL) tablet 650 mg, 650 mg, Oral, Q4H PRN, Falguni Renee MD  •  aspirin chewable tablet 81 mg, 81 mg, Oral, Daily, Falguni Renee MD, 81 mg at 01/04/22 0841  •  atorvastatin (LIPITOR) tablet 10 mg, 10 mg, Oral, Daily, Falguni Renee MD, 10 mg at 01/04/22 0841  •  benzonatate (TESSALON) capsule 100 mg, 100 mg, Oral, Daily, Falguni Renee MD, 100 mg at 01/04/22 0841  •  heparin (porcine) 5000 UNIT/ML injection 3,100-6,200 Units, 30-60 Units/kg, Intravenous, Q6H PRN, Leyda Rodriguez APRN, 5,000 Units at 01/04/22 0025  •  heparin 36877 units/250 mL (100 units/mL) in 0.45 % NaCl infusion, 12 Units/kg/hr, Intravenous, Titrated, Leyda Rdoriguez APRN, Last Rate: 13.39 mL/hr at 01/04/22 0026, 13 Units/kg/hr at 01/04/22 0026  •  Hold medication, 1 each, Does not apply, Continuous PRN, Rafaela Stout, ELI, APRN  •  melatonin tablet 3 mg, 3 mg, Oral, Nightly PRN, Falguni Renee MD, 3 mg at 01/04/22 0029  •  metoprolol succinate XL (TOPROL-XL) 24 hr tablet 100 mg, 100 mg, Oral, Daily, Falguni Renee MD, 100 mg at 01/04/22 0841  •  nitroglycerin (NITROSTAT) SL tablet 0.4 mg, 0.4 mg, Sublingual, Q5 Min PRN, Falguni Renee MD  •  ondansetron (ZOFRAN) tablet 4 mg, 4 mg, Oral, Q6H PRN **OR** ondansetron (ZOFRAN) injection 4 mg, 4 mg, Intravenous, Q6H PRN, Falguni Renee MD  •  [COMPLETED] Insert peripheral IV, , , Once **AND** sodium chloride 0.9 % flush 10 mL, 10 mL, Intravenous, PRN, Dylan Barnhart MD      Objective     Vital Sign Min/Max for last 24 hours  Temp  Min: 97.8 °F (36.6 °C)  Max: 99.1  °F (37.3 °C)   BP  Min: 85/64  Max: 152/72    Pulse  Min: 66  Max: 90         01/09/22  0500 01/10/22  0300 01/11/22  0400   Weight: 102 kg (224 lb) 104 kg (228 lb 4.8 oz) 103 kg (226 lb 13.7 oz)        Intake/Output Summary (Last 24 hours) at 1/11/2022 0743  Last data filed at 1/11/2022 0410  Gross per 24 hour   Intake 3237 ml   Output 1320 ml   Net 1917 ml     Physical Exam:      General Appearance:    Well developed and well nourished in no acute distress   Neck:    no JVD   Lungs:    Right chest tube in place, with less dullness on the right side today. Has breath sounds approximately 2/3 up. No rales or wheezes bilaterally.  No accessory muscle use.     Heart:   Regular rate and rhythm, with normal mechanical aortic valve clicks and grade 1/6 aortic murmur.   Abdomen:     Normal bowel sounds, soft non-tender, non-distended   Extremities:   No edema in BLE. BLE warm with no cyanosis   Skin:   Warm and dry   Neurologic:   awake alert and oriented x3, speech clear and approp      Monitor: Reviewed.  Results Review:     I reviewed the patient's new clinical results.    Sodium Sodium   Date Value Ref Range Status   01/11/2022 130 (L) 136 - 145 mmol/L Final   01/10/2022 129 (L) 136 - 145 mmol/L Final   01/09/2022 133 (L) 136 - 145 mmol/L Final      Potassium Potassium   Date Value Ref Range Status   01/11/2022 4.3 3.5 - 5.2 mmol/L Final   01/10/2022 4.7 3.5 - 5.2 mmol/L Final   01/09/2022 4.7 3.5 - 5.2 mmol/L Final      Chloride Chloride   Date Value Ref Range Status   01/11/2022 97 (L) 98 - 107 mmol/L Final   01/10/2022 96 (L) 98 - 107 mmol/L Final   01/09/2022 98 98 - 107 mmol/L Final      Bicarbonate No results found for: PLASMABICARB   BUN BUN   Date Value Ref Range Status   01/11/2022 15 8 - 23 mg/dL Final   01/10/2022 14 8 - 23 mg/dL Final   01/09/2022 12 8 - 23 mg/dL Final      Creatinine Creatinine   Date Value Ref Range Status   01/11/2022 0.64 (L) 0.76 - 1.27 mg/dL Final   01/10/2022 0.87 0.76 - 1.27 mg/dL  Final   01/09/2022 0.65 (L) 0.76 - 1.27 mg/dL Final      Calcium Calcium   Date Value Ref Range Status   01/11/2022 8.5 (L) 8.6 - 10.5 mg/dL Final   01/10/2022 8.1 (L) 8.6 - 10.5 mg/dL Final   01/09/2022 8.5 (L) 8.6 - 10.5 mg/dL Final      Magnesium Magnesium   Date Value Ref Range Status   01/10/2022 1.9 1.6 - 2.4 mg/dL Final        Results from last 7 days   Lab Units 01/11/22  0410 01/10/22  1140 01/10/22  0537   WBC 10*3/mm3  --   --  16.13*   HEMOGLOBIN g/dL 7.8*   < > 7.5*   HEMATOCRIT % 23.1*   < > 22.5*   PLATELETS 10*3/mm3  --   --  392    < > = values in this interval not displayed.       Results from last 7 days   Lab Units 01/11/22  0410 01/10/22  0537 01/09/22  0601   INR  2.09* 1.98* 1.67*       Assessment/Plan   Assessment/ Plan  1. Hemothorax; code on 1/9/2022.  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 right-sided pleural effusion        -s/p R thoracentesis 1/3/21:500cc of bloody fluid removed  4. HTN    Sudden decompensation on 1/9/2022, and thoracic surgery assessment is old blood, not new. Approximately 2200 cc drained 1/10/2022.    Despite holding Coumadin, INR has increased now 2.09 (1.98/1.67). This is not unexpected, as INR typically lags Coumadin dosing by 2 days.    Hemoglobin stable at 7.8 (7.5/8.6).    Cardiac status is stable, with excellent aortic valve sounds.  Recommend holding all anticoagulation till bleeding is for sure stable/resolved. Await CT surgery comment and okay.    Fresh frozen plasma would be okay if rapid hemostasis is needed, but no vitamin K.  Discussed in detail with patient, wife, nurse.    Rickie Posada MD  01/11/22  07:43 EST      Time: 19    Dictated portions using Dragon dictation software.     During the entire encounter, I was wearing recommended PPE including face mask and eye protection. Hand sanitization was performed prior to  entering room and upon exit.

## 2022-01-11 NOTE — PROGRESS NOTES
"    Chief Complaint: hemothorax  S/P: emergent chest tube insertion  POD # 1    Subjective:  Symptoms:  Improved.  He reports chest pain, weakness and anorexia.    Diet:  NPO.  No nausea or vomiting.    Activity level: Impaired due to weakness.    Pain:  He complains of pain that is mild.        Vital Signs:  Temp:  [97.9 °F (36.6 °C)-99.1 °F (37.3 °C)] 98 °F (36.7 °C)  Heart Rate:  [66-88] 66  Resp:  [12-27] 21  BP: ()/(57-77) 124/71    Intake & Output (last day)       01/10 0701  01/11 0700 01/11 0701  01/12 0700    P.O. 100     I.V. (mL/kg) 1877 (18.2)     Blood 1260     Total Intake(mL/kg) 3237 (31.4)     Urine (mL/kg/hr) 800 (0.3)     Stool      Chest Tube 520     Total Output 1320     Net +1917                 Objective:  General Appearance:  Ill-appearing, in no acute distress, not in pain and comfortable.    Vital signs: (most recent): Blood pressure 120/64, pulse 73, temperature 98 °F (36.7 °C), resp. rate 20, height 177.8 cm (70\"), weight 103 kg (226 lb 13.7 oz), SpO2 98 %.  Vital signs are normal.  No fever.    Output: Producing urine.    HEENT: Normal HEENT exam.    Lungs:  Normal effort and normal respiratory rate.  He is not in respiratory distress.  There are decreased breath sounds.    Heart: Normal rate.  Regular rhythm.    Chest: Chest wall tenderness present.    Abdomen: (Right flank ecchymosis is improving).  Bowel sounds are normal.   There is generalized tenderness.     Pulses: Distal pulses are intact.    Neurological: Patient is alert and oriented to person, place and time.    Skin:  Warm and dry.            Chest tube:   Site: Right, Clean, Dry and Securement device intact  Suction: -20 cm  Air Leak: negative  24 Hour Total: 520cc    Results Review:     I reviewed the patient's new clinical results.  I reviewed the patient's new imaging results and agree with the interpretation.  I reviewed the patient's other test results and agree with the interpretation  Discussed with patient, RN, " Dr. Seaman and Dr. Su    Imaging Results (Last 24 Hours)     Procedure Component Value Units Date/Time    XR Chest 1 View [[id number]] Collected: 01/11/22 0501     Updated: 01/11/22 0506    Narrative:      SINGLE VIEW OF THE CHEST     HISTORY: Pneumothorax     COMPARISON: 06/10/2022     FINDINGS:  Cardiomegaly is present. There is no vascular congestion. Right-sided  chest tube remains in place. No pneumothorax is seen. There is  persistent right pleural effusion, not significantly changed in size  when compared to prior exam. There is bibasilar consolidation. No  definite effusion is seen on the left.       Impression:      No significant interval change.     This report was finalized on 1/11/2022 5:03 AM by Dr. Winifred Woods M.D.       XR Chest 1 View [[id number]] Collected: 01/10/22 1533     Updated: 01/10/22 1539    Narrative:      XR CHEST 1 VW-     HISTORY: Male who is 66 years-old,  chest tube, hemothorax     TECHNIQUE: Frontal view of the chest     COMPARISON: 01/10/2022     FINDINGS: Stable appearing right chest tube. Heart, mediastinum and  pulmonary vasculature are unremarkable. Persistent right pleural  effusion and basilar likely atelectasis, appears similar to prior exam.  No pneumothorax. No acute osseous process.       Impression:      No significant change.     This report was finalized on 1/10/2022 3:36 PM by Dr. Michael Smith M.D.             Lab Results:     Lab Results (last 24 hours)     Procedure Component Value Units Date/Time    Basic Metabolic Panel [[id number]]  (Abnormal) Collected: 01/11/22 0410    Specimen: Blood Updated: 01/11/22 0603     Glucose 124 mg/dL      BUN 15 mg/dL      Creatinine 0.64 mg/dL      Sodium 130 mmol/L      Potassium 4.3 mmol/L      Chloride 97 mmol/L      CO2 25.9 mmol/L      Calcium 8.5 mg/dL      eGFR Non African Amer 125 mL/min/1.73      BUN/Creatinine Ratio 23.4     Anion Gap 7.1 mmol/L     Narrative:      GFR Normal >60  Chronic Kidney  Disease <60  Kidney Failure <15      Protime-INR [[id number]]  (Abnormal) Collected: 01/11/22 0410    Specimen: Blood Updated: 01/11/22 0547     Protime 23.2 Seconds      INR 2.09    Hemoglobin & Hematocrit, Blood [[id number]]  (Abnormal) Collected: 01/11/22 0410    Specimen: Blood Updated: 01/11/22 0535     Hemoglobin 7.8 g/dL      Hematocrit 23.1 %     Hemoglobin & Hematocrit, Blood [[id number]]  (Abnormal) Collected: 01/11/22 0128    Specimen: Blood Updated: 01/11/22 0233     Hemoglobin 7.7 g/dL      Hematocrit 23.0 %     POC Glucose Once [[id number]]  (Abnormal) Collected: 01/11/22 0113    Specimen: Blood Updated: 01/11/22 0115     Glucose 134 mg/dL      Comment: Meter: [id number] : [id number] Jani Hanley RN       POC Glucose Once [[id number]]  (Abnormal) Collected: 01/10/22 1706    Specimen: Blood Updated: 01/10/22 1712     Glucose 157 mg/dL      Comment: Meter: [id number] : [id number] Talat CORNEJO       Hemoglobin & Hematocrit, Blood [[id number]]  (Abnormal) Collected: 01/10/22 1140    Specimen: Blood Updated: 01/10/22 1155     Hemoglobin 7.9 g/dL      Hematocrit 24.5 %            Assessment/Plan       Hemothorax    Abdominal wall hematoma    Mechanical heart valve present    Essential hypertension       Assessment & Plan     I independently reviewed CT of the chest performed 1/11/2022 which demonstrates significant increase in right hemothorax with some mediastinal shift to the left.  There is consolidation within the right upper lobe.  There is improvement in abdominal wall hemorrhage compared to previous imaging.  No intra-abdominal hemorrhage is present.  This morning's chest x-ray has a stable appearance compared to yesterday's imaging which demonstrates persistent right pleural effusion although this is improved since placement of right chest tube.    Hemothorax: Some improvement on chest x-ray with chest tube placement, however with continued anemia and persistent effusion, patient will  need a right VATS with decortication.  Dr. Andrews and I discussed this at length with the patient and his wife.  All their questions were answered to their satisfaction and they request Dr. Andrews proceed.  Plan will be for surgery tomorrow.  Okay for regular diet today.  Patient should be n.p.o. after midnight.    Anemia: Secondary to #1.  This morning's hemoglobin is 7.8.  Patient has received 2 units PRBCs and 2 units of FFP.  Continue with serial H&H every 6.    Mechanical heart valve: Chronically anticoagulated with warfarin.  Patient's INR is 2.09 today.  Holding anticoagulation.      We will go ahead and order 2 units FFP to be given prior to surgery tomorrow morning and have FFP and PRBCs on hold for the OR tomorrow.    DWAYNE Martin  [facility]  01/11/22  10:31 EST    Patient was seen and assessed while wearing personal protective equipment including facemask, protective eyewear and gloves.  Hand hygiene performed prior to entering the room and upon exiting with doffing of gloves.

## 2022-01-11 NOTE — PLAN OF CARE
Goal Outcome Evaluation:      Pt remain overflow. Scheduled for surgery tomorrow morning @ 0730 am. Consent signed already. NPO after midnight. H & H Q6 hrs. Uses urinal. A & O X4. Vital stable. Will keep monitoring.

## 2022-01-11 NOTE — H&P (VIEW-ONLY)
"    Chief Complaint: hemothorax  S/P: emergent chest tube insertion  POD # 1    Subjective:  Symptoms:  Improved.  He reports chest pain, weakness and anorexia.    Diet:  NPO.  No nausea or vomiting.    Activity level: Impaired due to weakness.    Pain:  He complains of pain that is mild.        Vital Signs:  Temp:  [97.9 °F (36.6 °C)-99.1 °F (37.3 °C)] 98 °F (36.7 °C)  Heart Rate:  [66-88] 66  Resp:  [12-27] 21  BP: ()/(57-77) 124/71    Intake & Output (last day)       01/10 0701  01/11 0700 01/11 0701  01/12 0700    P.O. 100     I.V. (mL/kg) 1877 (18.2)     Blood 1260     Total Intake(mL/kg) 3237 (31.4)     Urine (mL/kg/hr) 800 (0.3)     Stool      Chest Tube 520     Total Output 1320     Net +1917                 Objective:  General Appearance:  Ill-appearing, in no acute distress, not in pain and comfortable.    Vital signs: (most recent): Blood pressure 120/64, pulse 73, temperature 98 °F (36.7 °C), resp. rate 20, height 177.8 cm (70\"), weight 103 kg (226 lb 13.7 oz), SpO2 98 %.  Vital signs are normal.  No fever.    Output: Producing urine.    HEENT: Normal HEENT exam.    Lungs:  Normal effort and normal respiratory rate.  He is not in respiratory distress.  There are decreased breath sounds.    Heart: Normal rate.  Regular rhythm.    Chest: Chest wall tenderness present.    Abdomen: (Right flank ecchymosis is improving).  Bowel sounds are normal.   There is generalized tenderness.     Pulses: Distal pulses are intact.    Neurological: Patient is alert and oriented to person, place and time.    Skin:  Warm and dry.            Chest tube:   Site: Right, Clean, Dry and Securement device intact  Suction: -20 cm  Air Leak: negative  24 Hour Total: 520cc    Results Review:     I reviewed the patient's new clinical results.  I reviewed the patient's new imaging results and agree with the interpretation.  I reviewed the patient's other test results and agree with the interpretation  Discussed with patient, RN, " Dr. Seaman and Dr. Su    Imaging Results (Last 24 Hours)     Procedure Component Value Units Date/Time    XR Chest 1 View [[id number]] Collected: 01/11/22 0501     Updated: 01/11/22 0506    Narrative:      SINGLE VIEW OF THE CHEST     HISTORY: Pneumothorax     COMPARISON: 06/10/2022     FINDINGS:  Cardiomegaly is present. There is no vascular congestion. Right-sided  chest tube remains in place. No pneumothorax is seen. There is  persistent right pleural effusion, not significantly changed in size  when compared to prior exam. There is bibasilar consolidation. No  definite effusion is seen on the left.       Impression:      No significant interval change.     This report was finalized on 1/11/2022 5:03 AM by Dr. Winifred Woods M.D.       XR Chest 1 View [[id number]] Collected: 01/10/22 1533     Updated: 01/10/22 1539    Narrative:      XR CHEST 1 VW-     HISTORY: Male who is 66 years-old,  chest tube, hemothorax     TECHNIQUE: Frontal view of the chest     COMPARISON: 01/10/2022     FINDINGS: Stable appearing right chest tube. Heart, mediastinum and  pulmonary vasculature are unremarkable. Persistent right pleural  effusion and basilar likely atelectasis, appears similar to prior exam.  No pneumothorax. No acute osseous process.       Impression:      No significant change.     This report was finalized on 1/10/2022 3:36 PM by Dr. Michael Smith M.D.             Lab Results:     Lab Results (last 24 hours)     Procedure Component Value Units Date/Time    Basic Metabolic Panel [[id number]]  (Abnormal) Collected: 01/11/22 0410    Specimen: Blood Updated: 01/11/22 0603     Glucose 124 mg/dL      BUN 15 mg/dL      Creatinine 0.64 mg/dL      Sodium 130 mmol/L      Potassium 4.3 mmol/L      Chloride 97 mmol/L      CO2 25.9 mmol/L      Calcium 8.5 mg/dL      eGFR Non African Amer 125 mL/min/1.73      BUN/Creatinine Ratio 23.4     Anion Gap 7.1 mmol/L     Narrative:      GFR Normal >60  Chronic Kidney  Disease <60  Kidney Failure <15      Protime-INR [[id number]]  (Abnormal) Collected: 01/11/22 0410    Specimen: Blood Updated: 01/11/22 0547     Protime 23.2 Seconds      INR 2.09    Hemoglobin & Hematocrit, Blood [[id number]]  (Abnormal) Collected: 01/11/22 0410    Specimen: Blood Updated: 01/11/22 0535     Hemoglobin 7.8 g/dL      Hematocrit 23.1 %     Hemoglobin & Hematocrit, Blood [[id number]]  (Abnormal) Collected: 01/11/22 0128    Specimen: Blood Updated: 01/11/22 0233     Hemoglobin 7.7 g/dL      Hematocrit 23.0 %     POC Glucose Once [[id number]]  (Abnormal) Collected: 01/11/22 0113    Specimen: Blood Updated: 01/11/22 0115     Glucose 134 mg/dL      Comment: Meter: [id number] : [id number] Jani Hanley RN       POC Glucose Once [[id number]]  (Abnormal) Collected: 01/10/22 1706    Specimen: Blood Updated: 01/10/22 1712     Glucose 157 mg/dL      Comment: Meter: [id number] : [id number] Talat CORNEJO       Hemoglobin & Hematocrit, Blood [[id number]]  (Abnormal) Collected: 01/10/22 1140    Specimen: Blood Updated: 01/10/22 1155     Hemoglobin 7.9 g/dL      Hematocrit 24.5 %            Assessment/Plan       Hemothorax    Abdominal wall hematoma    Mechanical heart valve present    Essential hypertension       Assessment & Plan     I independently reviewed CT of the chest performed 1/11/2022 which demonstrates significant increase in right hemothorax with some mediastinal shift to the left.  There is consolidation within the right upper lobe.  There is improvement in abdominal wall hemorrhage compared to previous imaging.  No intra-abdominal hemorrhage is present.  This morning's chest x-ray has a stable appearance compared to yesterday's imaging which demonstrates persistent right pleural effusion although this is improved since placement of right chest tube.    Hemothorax: Some improvement on chest x-ray with chest tube placement, however with continued anemia and persistent effusion, patient will  need a right VATS with decortication.  Dr. Andrews and I discussed this at length with the patient and his wife.  All their questions were answered to their satisfaction and they request Dr. Andrews proceed.  Plan will be for surgery tomorrow.  Okay for regular diet today.  Patient should be n.p.o. after midnight.    Anemia: Secondary to #1.  This morning's hemoglobin is 7.8.  Patient has received 2 units PRBCs and 2 units of FFP.  Continue with serial H&H every 6.    Mechanical heart valve: Chronically anticoagulated with warfarin.  Patient's INR is 2.09 today.  Holding anticoagulation.      We will go ahead and order 2 units FFP to be given prior to surgery tomorrow morning and have FFP and PRBCs on hold for the OR tomorrow.    DWAYNE Martin  [facility]  01/11/22  10:31 EST    Patient was seen and assessed while wearing personal protective equipment including facemask, protective eyewear and gloves.  Hand hygiene performed prior to entering the room and upon exiting with doffing of gloves.

## 2022-01-11 NOTE — PROGRESS NOTES
Han Seaman MD                          [phone]      Patient ID:    Name:  Claudio Krishna    MRN:  [id number]    1955   66 y.o.  male            Patient Care Team:  Shayan Celeste MD as PCP - General (Family Medicine)    CC/ Reason for visit: Acute respiratory failure, spontaneous right abdominal wall hematoma thought to be from coughing, worsening right hemothorax, cardiac arrest    Subjective: Pt seen and examined this AM.  No acute overnight events.  Did not have any further bleeding after the initial >2 L bloody output.  Hemoglobin is stable and responded well to transfusion and adequately volume resuscitated.  On minimal supplemental oxygen.  No new fevers.  Not a lot of pain reported.    ROS: Complains of some chest soreness with CPR but otherwise no nausea vomiting or diarrhea.  Denies any palpitations    Objective     Vital Signs past 24hrs    BP range: BP: (115-155)/(60-77) 120/64  Pulse range: Heart Rate:  [66-81] 73  Resp rate range: Resp:  [12-27] 20  Temp range: Temp (24hrs), Av.4 °F (36.9 °C), Min:97.9 °F (36.6 °C), Max:99.1 °F (37.3 °C)      Ventilator/Non-Invasive Ventilation Settings (From admission, onward)            None          Device (Oxygen Therapy): nasal cannula       103 kg (226 lb 13.7 oz); Body mass index is 32.55 kg/m².      Intake/Output Summary (Last 24 hours) at 2022 1239  Last data filed at 2022 0410  Gross per 24 hour   Intake 3237 ml   Output 870 ml   Net 2367 ml       PHYSICAL EXAM   Constitutional: Middle aged pt in bed, No acute respiratory distress, + accessory muscle use  Head: - NCAT  Eyes: No pallor.  Anicteric sclerae, EOMI.  ENMT:  Mallampati 3, no oral thrush. Moist MM.   NECK: Trachea midline, No thyromegaly, no palpable cervical lymphadenopathy  Heart: RRR, no murmur. No pedal edema   Lungs: MAYE +, right-sided chest tube +. No wheezes/ crackles heard    Abdomen: Soft. No tenderness, guarding or  rigidity. No palpable masses  Extremities: Extremities warm and well perfused. No cyanosis/ clubbing  Neuro: Conscious, answers appropriately, no gross focal neuro deficits  Psych: Mood and affect appropriate    PPE recommended per [facility] infectious disease Isolation protocol for the current clinical scenario(as mentioned below) was followed.     Scheduled meds:  atorvastatin, 10 mg, Oral, Daily  docusate sodium, 100 mg, Oral, BID  polyethylene glycol, 17 g, Oral, Daily  [START ON 1/12/2022] sodium chloride, 75 mL/hr, Intravenous, Once        IV meds:                      hold, 1 each        Data Review:      Results from last 7 days   Lab Units 01/11/22  1204 01/11/22  0410 01/11/22  0128 01/10/22  1140 01/10/22  0537 01/10/22  0257 01/10/22  0257 01/09/22  0601 01/09/22  0601 01/07/22  0558 01/06/22  0509 01/05/22  0439 01/05/22  0439   SODIUM mmol/L  --  130*  --   --   --   --  129*  --  133*   < > 131*   < > 131*   POTASSIUM mmol/L  --  4.3  --   --   --   --  4.7  --  4.7   < > 4.6   < > 4.4   CHLORIDE mmol/L  --  97*  --   --   --   --  96*  --  98   < > 94*   < > 97*   CO2 mmol/L  --  25.9  --   --   --   --  24.4  --  27.0   < > 28.9   < > 24.8   BUN mg/dL  --  15  --   --   --   --  14  --  12   < > 12   < > 12   CREATININE mg/dL  --  0.64*  --   --   --   --  0.87  --  0.65*   < > 0.72*   < > 0.64*   CALCIUM mg/dL  --  8.5*  --   --   --   --  8.1*  --  8.5*   < > 8.8   < > 8.3*   BILIRUBIN mg/dL  --   --   --   --   --   --  0.9  --   --   --   --   --   --    ALK PHOS U/L  --   --   --   --   --   --  58  --   --   --   --   --   --    ALT (SGPT) U/L  --   --   --   --   --   --  22  --   --   --   --   --   --    AST (SGOT) U/L  --   --   --   --   --   --  22  --   --   --   --   --   --    GLUCOSE mg/dL  --  124*  --   --   --   --  173*  --  107*   < > 119*   < > 118*   WBC 10*3/mm3  --   --   --   --  16.13*  --  14.46*  --  7.19   < > 9.74   < > 9.29   HEMOGLOBIN g/dL 7.9* 7.8* 7.7*   <  > 7.5*   < > 8.1*   < > 8.6*   < > 9.0*   < > 8.5*   PLATELETS 10*3/mm3  --   --   --   --  392  --  360  --  338   < > 323   < > 239   INR   --  2.09*  --   --  1.98*  --   --   --  1.67*   < > 1.27*   < > 1.31*   PROCALCITONIN ng/mL  --   --   --   --   --   --   --   --   --   --  0.06  --  0.04    < > = values in this interval not displayed.       Lab Results   Component Value Date    CALCIUM 8.5 (L) 01/11/2022    PHOS 3.2 01/03/2022                    Results Review:    I have reviewed the relevant laboratory results and independently reviewed the chest imaging from this hospitalization including the available echocardiogram reports personally and summarized it if/ when appropriate below    Assessment    Resuscitated PEA arrest; brief - 1/10  Acute hypoxic respiratory failure  Right hemothorax s/p emergent Rt CT 1/10    Acute blood loss anemia s/p PRBC  Hypovolemic hypotension  Abdominal wall hematoma; improved -spontaneous  Coumadin coagulopathy  Mechanical aortic valve s/p full AC; held now  Hyponatremia    PLAN:  Patient is stable from a respiratory standpoint and on minimal supplemental oxygen.  Wean as tolerated  Hemodynamically stable as well.  Blood pressure appropriate.  Adequately volume resuscitated and required PRBCs.  Hemoglobin is being trended and stable.  Will decrease frequency  Thoracic surgery plan noted and possible OR tomorrow.  Cardiology input noted and ongoing plan to hold anticoagulation.  Note that the INR is still therapeutic likely from residual Coumadin effect.  Will defer anticoagulation plan to cardiology/cardiothoracic surgery.  Hyponatremia is stable and will continue to monitor closely  Advance diet  Guarded prognosis  Full code    Discussed with the patient, wife and consultants as well as nurse and and multidisciplinary round.     Will move pt to 5E. Will follow for ongoing pulm/ CC issues.       Han Seaman MD  1/11/2022

## 2022-01-12 ENCOUNTER — APPOINTMENT (OUTPATIENT)
Dept: GENERAL RADIOLOGY | Facility: HOSPITAL | Age: 67
End: 2022-01-12

## 2022-01-12 ENCOUNTER — ANESTHESIA (OUTPATIENT)
Dept: PERIOP | Facility: HOSPITAL | Age: 67
End: 2022-01-12

## 2022-01-12 LAB
ANION GAP SERPL CALCULATED.3IONS-SCNC: 6.2 MMOL/L (ref 5–15)
ANION GAP SERPL CALCULATED.3IONS-SCNC: 9.6 MMOL/L (ref 5–15)
APTT PPP: 46.3 SECONDS (ref 22.7–35.4)
BH BB BLOOD EXPIRATION DATE: NORMAL
BH BB BLOOD EXPIRATION DATE: NORMAL
BH BB BLOOD TYPE BARCODE: 5100
BH BB BLOOD TYPE BARCODE: 5100
BH BB DISPENSE STATUS: NORMAL
BH BB DISPENSE STATUS: NORMAL
BH BB PRODUCT CODE: NORMAL
BH BB PRODUCT CODE: NORMAL
BH BB UNIT NUMBER: NORMAL
BH BB UNIT NUMBER: NORMAL
BUN SERPL-MCNC: 11 MG/DL (ref 8–23)
BUN SERPL-MCNC: 11 MG/DL (ref 8–23)
BUN/CREAT SERPL: 19.6 (ref 7–25)
BUN/CREAT SERPL: 20 (ref 7–25)
CALCIUM SPEC-SCNC: 7.9 MG/DL (ref 8.6–10.5)
CALCIUM SPEC-SCNC: 8.2 MG/DL (ref 8.6–10.5)
CHLORIDE SERPL-SCNC: 100 MMOL/L (ref 98–107)
CHLORIDE SERPL-SCNC: 97 MMOL/L (ref 98–107)
CO2 SERPL-SCNC: 24.8 MMOL/L (ref 22–29)
CO2 SERPL-SCNC: 25.4 MMOL/L (ref 22–29)
CREAT SERPL-MCNC: 0.55 MG/DL (ref 0.76–1.27)
CREAT SERPL-MCNC: 0.56 MG/DL (ref 0.76–1.27)
DEPRECATED RDW RBC AUTO: 45.5 FL (ref 37–54)
DEPRECATED RDW RBC AUTO: 47 FL (ref 37–54)
ERYTHROCYTE [DISTWIDTH] IN BLOOD BY AUTOMATED COUNT: 13.4 % (ref 12.3–15.4)
ERYTHROCYTE [DISTWIDTH] IN BLOOD BY AUTOMATED COUNT: 13.5 % (ref 12.3–15.4)
GFR SERPL CREATININE-BSD FRML MDRD: 146 ML/MIN/1.73
GFR SERPL CREATININE-BSD FRML MDRD: 149 ML/MIN/1.73
GLUCOSE BLDC GLUCOMTR-MCNC: 131 MG/DL (ref 70–130)
GLUCOSE BLDC GLUCOMTR-MCNC: 177 MG/DL (ref 70–130)
GLUCOSE BLDC GLUCOMTR-MCNC: 178 MG/DL (ref 70–130)
GLUCOSE SERPL-MCNC: 109 MG/DL (ref 65–99)
GLUCOSE SERPL-MCNC: 163 MG/DL (ref 65–99)
HCT VFR BLD AUTO: 23.3 % (ref 37.5–51)
HCT VFR BLD AUTO: 23.3 % (ref 37.5–51)
HCT VFR BLD AUTO: 32.3 % (ref 37.5–51)
HCT VFR BLD AUTO: 34.4 % (ref 37.5–51)
HCT VFR BLD AUTO: 34.6 % (ref 37.5–51)
HCT VFR BLD AUTO: 34.9 % (ref 37.5–51)
HGB BLD-MCNC: 11.1 G/DL (ref 13–17.7)
HGB BLD-MCNC: 11.3 G/DL (ref 13–17.7)
HGB BLD-MCNC: 11.5 G/DL (ref 13–17.7)
HGB BLD-MCNC: 11.9 G/DL (ref 13–17.7)
HGB BLD-MCNC: 7.4 G/DL (ref 13–17.7)
HGB BLD-MCNC: 7.4 G/DL (ref 13–17.7)
INR PPP: 1.94 (ref 0.9–1.1)
MCH RBC QN AUTO: 30.6 PG (ref 26.6–33)
MCH RBC QN AUTO: 30.6 PG (ref 26.6–33)
MCHC RBC AUTO-ENTMCNC: 31.8 G/DL (ref 31.5–35.7)
MCHC RBC AUTO-ENTMCNC: 32.7 G/DL (ref 31.5–35.7)
MCV RBC AUTO: 93.8 FL (ref 79–97)
MCV RBC AUTO: 96.3 FL (ref 79–97)
PLATELET # BLD AUTO: 249 10*3/MM3 (ref 140–450)
PLATELET # BLD AUTO: 265 10*3/MM3 (ref 140–450)
PMV BLD AUTO: 9.1 FL (ref 6–12)
PMV BLD AUTO: 9.2 FL (ref 6–12)
POTASSIUM SERPL-SCNC: 4.3 MMOL/L (ref 3.5–5.2)
POTASSIUM SERPL-SCNC: 4.6 MMOL/L (ref 3.5–5.2)
PROTHROMBIN TIME: 21.9 SECONDS (ref 11.7–14.2)
RBC # BLD AUTO: 2.42 10*6/MM3 (ref 4.14–5.8)
RBC # BLD AUTO: 3.69 10*6/MM3 (ref 4.14–5.8)
SODIUM SERPL-SCNC: 131 MMOL/L (ref 136–145)
SODIUM SERPL-SCNC: 132 MMOL/L (ref 136–145)
UNIT  ABO: NORMAL
UNIT  ABO: NORMAL
UNIT  RH: NORMAL
UNIT  RH: NORMAL
WBC NRBC COR # BLD: 8.94 10*3/MM3 (ref 3.4–10.8)
WBC NRBC COR # BLD: 9.21 10*3/MM3 (ref 3.4–10.8)

## 2022-01-12 PROCEDURE — 94640 AIRWAY INHALATION TREATMENT: CPT

## 2022-01-12 PROCEDURE — 80048 BASIC METABOLIC PNL TOTAL CA: CPT | Performed by: THORACIC SURGERY (CARDIOTHORACIC VASCULAR SURGERY)

## 2022-01-12 PROCEDURE — 85018 HEMOGLOBIN: CPT | Performed by: NURSE PRACTITIONER

## 2022-01-12 PROCEDURE — 0 CEFAZOLIN IN DEXTROSE 2-4 GM/100ML-% SOLUTION: Performed by: NURSE PRACTITIONER

## 2022-01-12 PROCEDURE — 25010000002 HYDROCORTISONE SODIUM SUCCINATE 100 MG RECONSTITUTED SOLUTION: Performed by: ANESTHESIOLOGY

## 2022-01-12 PROCEDURE — 25010000002 HYDROMORPHONE PER 4 MG: Performed by: THORACIC SURGERY (CARDIOTHORACIC VASCULAR SURGERY)

## 2022-01-12 PROCEDURE — 25010000002 MAGNESIUM SULFATE PER 500 MG OF MAGNESIUM: Performed by: NURSE ANESTHETIST, CERTIFIED REGISTERED

## 2022-01-12 PROCEDURE — P9016 RBC LEUKOCYTES REDUCED: HCPCS

## 2022-01-12 PROCEDURE — 85014 HEMATOCRIT: CPT

## 2022-01-12 PROCEDURE — C1729 CATH, DRAINAGE: HCPCS | Performed by: THORACIC SURGERY (CARDIOTHORACIC VASCULAR SURGERY)

## 2022-01-12 PROCEDURE — P9017 PLASMA 1 DONOR FRZ W/IN 8 HR: HCPCS

## 2022-01-12 PROCEDURE — 36430 TRANSFUSION BLD/BLD COMPNT: CPT

## 2022-01-12 PROCEDURE — 86900 BLOOD TYPING SEROLOGIC ABO: CPT

## 2022-01-12 PROCEDURE — 94799 UNLISTED PULMONARY SVC/PX: CPT

## 2022-01-12 PROCEDURE — 86927 PLASMA FRESH FROZEN: CPT

## 2022-01-12 PROCEDURE — 0 BUPIVACAINE LIPOSOME 1.3 % SUSPENSION: Performed by: ANESTHESIOLOGY

## 2022-01-12 PROCEDURE — 25010000002 MIDAZOLAM PER 1 MG: Performed by: ANESTHESIOLOGY

## 2022-01-12 PROCEDURE — 82947 ASSAY GLUCOSE BLOOD QUANT: CPT

## 2022-01-12 PROCEDURE — 85610 PROTHROMBIN TIME: CPT | Performed by: INTERNAL MEDICINE

## 2022-01-12 PROCEDURE — 25010000002 FENTANYL CITRATE (PF) 50 MCG/ML SOLUTION: Performed by: ANESTHESIOLOGY

## 2022-01-12 PROCEDURE — 85027 COMPLETE CBC AUTOMATED: CPT | Performed by: THORACIC SURGERY (CARDIOTHORACIC VASCULAR SURGERY)

## 2022-01-12 PROCEDURE — 32652 THORACOSCOPY REM TOTL CORTEX: CPT | Performed by: THORACIC SURGERY (CARDIOTHORACIC VASCULAR SURGERY)

## 2022-01-12 PROCEDURE — 25010000002 ONDANSETRON PER 1 MG: Performed by: NURSE ANESTHETIST, CERTIFIED REGISTERED

## 2022-01-12 PROCEDURE — 85027 COMPLETE CBC AUTOMATED: CPT | Performed by: NURSE PRACTITIONER

## 2022-01-12 PROCEDURE — 25010000002 PHENYLEPHRINE 10 MG/ML SOLUTION: Performed by: NURSE ANESTHETIST, CERTIFIED REGISTERED

## 2022-01-12 PROCEDURE — 0BJ08ZZ INSPECTION OF TRACHEOBRONCHIAL TREE, VIA NATURAL OR ARTIFICIAL OPENING ENDOSCOPIC: ICD-10-PCS | Performed by: THORACIC SURGERY (CARDIOTHORACIC VASCULAR SURGERY)

## 2022-01-12 PROCEDURE — 85730 THROMBOPLASTIN TIME PARTIAL: CPT | Performed by: NURSE PRACTITIONER

## 2022-01-12 PROCEDURE — 85018 HEMOGLOBIN: CPT

## 2022-01-12 PROCEDURE — 25010000002 DIPHENHYDRAMINE PER 50 MG: Performed by: INTERNAL MEDICINE

## 2022-01-12 PROCEDURE — 32652 THORACOSCOPY REM TOTL CORTEX: CPT | Performed by: SPECIALIST/TECHNOLOGIST, OTHER

## 2022-01-12 PROCEDURE — 71045 X-RAY EXAM CHEST 1 VIEW: CPT

## 2022-01-12 PROCEDURE — 82962 GLUCOSE BLOOD TEST: CPT

## 2022-01-12 PROCEDURE — 0BCF4ZZ EXTIRPATION OF MATTER FROM RIGHT LOWER LUNG LOBE, PERCUTANEOUS ENDOSCOPIC APPROACH: ICD-10-PCS | Performed by: THORACIC SURGERY (CARDIOTHORACIC VASCULAR SURGERY)

## 2022-01-12 PROCEDURE — 85018 HEMOGLOBIN: CPT | Performed by: THORACIC SURGERY (CARDIOTHORACIC VASCULAR SURGERY)

## 2022-01-12 PROCEDURE — C9290 INJ, BUPIVACAINE LIPOSOME: HCPCS | Performed by: ANESTHESIOLOGY

## 2022-01-12 PROCEDURE — 82803 BLOOD GASES ANY COMBINATION: CPT

## 2022-01-12 PROCEDURE — 76942 ECHO GUIDE FOR BIOPSY: CPT | Performed by: THORACIC SURGERY (CARDIOTHORACIC VASCULAR SURGERY)

## 2022-01-12 PROCEDURE — 85014 HEMATOCRIT: CPT | Performed by: NURSE PRACTITIONER

## 2022-01-12 PROCEDURE — 85014 HEMATOCRIT: CPT | Performed by: THORACIC SURGERY (CARDIOTHORACIC VASCULAR SURGERY)

## 2022-01-12 PROCEDURE — 25010000002 PHENYLEPHRINE 10 MG/ML SOLUTION 5 ML VIAL: Performed by: NURSE ANESTHETIST, CERTIFIED REGISTERED

## 2022-01-12 PROCEDURE — 0BCD4ZZ EXTIRPATION OF MATTER FROM RIGHT MIDDLE LUNG LOBE, PERCUTANEOUS ENDOSCOPIC APPROACH: ICD-10-PCS | Performed by: THORACIC SURGERY (CARDIOTHORACIC VASCULAR SURGERY)

## 2022-01-12 PROCEDURE — 80048 BASIC METABOLIC PNL TOTAL CA: CPT | Performed by: STUDENT IN AN ORGANIZED HEALTH CARE EDUCATION/TRAINING PROGRAM

## 2022-01-12 PROCEDURE — 25010000002 PROPOFOL 10 MG/ML EMULSION: Performed by: NURSE ANESTHETIST, CERTIFIED REGISTERED

## 2022-01-12 RX ORDER — ETOMIDATE 2 MG/ML
INJECTION INTRAVENOUS AS NEEDED
Status: DISCONTINUED | OUTPATIENT
Start: 2022-01-12 | End: 2022-01-12

## 2022-01-12 RX ORDER — HEPARIN SODIUM 5000 [USP'U]/ML
5000 INJECTION, SOLUTION INTRAVENOUS; SUBCUTANEOUS EVERY 8 HOURS SCHEDULED
Status: DISCONTINUED | OUTPATIENT
Start: 2022-01-13 | End: 2022-01-14

## 2022-01-12 RX ORDER — DIPHENHYDRAMINE HYDROCHLORIDE 50 MG/ML
25 INJECTION INTRAMUSCULAR; INTRAVENOUS EVERY 4 HOURS PRN
Status: DISCONTINUED | OUTPATIENT
Start: 2022-01-12 | End: 2022-01-12 | Stop reason: HOSPADM

## 2022-01-12 RX ORDER — MIDAZOLAM HYDROCHLORIDE 1 MG/ML
INJECTION INTRAMUSCULAR; INTRAVENOUS
Status: COMPLETED | OUTPATIENT
Start: 2022-01-12 | End: 2022-01-12

## 2022-01-12 RX ORDER — DIPHENHYDRAMINE HYDROCHLORIDE 50 MG/ML
50 INJECTION INTRAMUSCULAR; INTRAVENOUS ONCE
Status: COMPLETED | OUTPATIENT
Start: 2022-01-12 | End: 2022-01-12

## 2022-01-12 RX ORDER — PROPOFOL 10 MG/ML
VIAL (ML) INTRAVENOUS AS NEEDED
Status: DISCONTINUED | OUTPATIENT
Start: 2022-01-12 | End: 2022-01-12 | Stop reason: SURG

## 2022-01-12 RX ORDER — ROCURONIUM BROMIDE 10 MG/ML
INJECTION, SOLUTION INTRAVENOUS AS NEEDED
Status: DISCONTINUED | OUTPATIENT
Start: 2022-01-12 | End: 2022-01-12 | Stop reason: SURG

## 2022-01-12 RX ORDER — SODIUM CHLORIDE 0.9 % (FLUSH) 0.9 %
3-10 SYRINGE (ML) INJECTION AS NEEDED
Status: DISCONTINUED | OUTPATIENT
Start: 2022-01-12 | End: 2022-01-12 | Stop reason: HOSPADM

## 2022-01-12 RX ORDER — KETAMINE HCL IN NACL, ISO-OSM 100MG/10ML
10 SYRINGE (ML) INJECTION
Status: DISCONTINUED | OUTPATIENT
Start: 2022-01-12 | End: 2022-01-19 | Stop reason: HOSPADM

## 2022-01-12 RX ORDER — LIDOCAINE HYDROCHLORIDE 10 MG/ML
0.5 INJECTION, SOLUTION EPIDURAL; INFILTRATION; INTRACAUDAL; PERINEURAL ONCE AS NEEDED
Status: DISCONTINUED | OUTPATIENT
Start: 2022-01-12 | End: 2022-01-12 | Stop reason: HOSPADM

## 2022-01-12 RX ORDER — FENTANYL CITRATE 50 UG/ML
INJECTION, SOLUTION INTRAMUSCULAR; INTRAVENOUS
Status: COMPLETED | OUTPATIENT
Start: 2022-01-12 | End: 2022-01-12

## 2022-01-12 RX ORDER — FAMOTIDINE 10 MG/ML
20 INJECTION, SOLUTION INTRAVENOUS ONCE
Status: COMPLETED | OUTPATIENT
Start: 2022-01-12 | End: 2022-01-12

## 2022-01-12 RX ORDER — BISACODYL 10 MG
10 SUPPOSITORY, RECTAL RECTAL DAILY PRN
Status: DISCONTINUED | OUTPATIENT
Start: 2022-01-12 | End: 2022-01-19 | Stop reason: HOSPADM

## 2022-01-12 RX ORDER — SODIUM CHLORIDE, SODIUM LACTATE, POTASSIUM CHLORIDE, CALCIUM CHLORIDE 600; 310; 30; 20 MG/100ML; MG/100ML; MG/100ML; MG/100ML
9 INJECTION, SOLUTION INTRAVENOUS CONTINUOUS
Status: DISCONTINUED | OUTPATIENT
Start: 2022-01-12 | End: 2022-01-17

## 2022-01-12 RX ORDER — NALOXONE HCL 0.4 MG/ML
0.4 VIAL (ML) INJECTION
Status: DISCONTINUED | OUTPATIENT
Start: 2022-01-12 | End: 2022-01-12 | Stop reason: HOSPADM

## 2022-01-12 RX ORDER — OXYCODONE HYDROCHLORIDE 5 MG/1
5 TABLET ORAL EVERY 4 HOURS PRN
Status: ACTIVE | OUTPATIENT
Start: 2022-01-12 | End: 2022-01-19

## 2022-01-12 RX ORDER — GABAPENTIN 300 MG/1
300 CAPSULE ORAL ONCE
Status: DISCONTINUED | OUTPATIENT
Start: 2022-01-12 | End: 2022-01-12 | Stop reason: HOSPADM

## 2022-01-12 RX ORDER — ACETAMINOPHEN 500 MG
1000 TABLET ORAL 3 TIMES DAILY
Status: DISCONTINUED | OUTPATIENT
Start: 2022-01-12 | End: 2022-01-19 | Stop reason: HOSPADM

## 2022-01-12 RX ORDER — CELECOXIB 100 MG/1
100 CAPSULE ORAL EVERY 12 HOURS SCHEDULED
Status: DISCONTINUED | OUTPATIENT
Start: 2022-01-12 | End: 2022-01-15

## 2022-01-12 RX ORDER — ONDANSETRON 2 MG/ML
INJECTION INTRAMUSCULAR; INTRAVENOUS AS NEEDED
Status: DISCONTINUED | OUTPATIENT
Start: 2022-01-12 | End: 2022-01-12 | Stop reason: SURG

## 2022-01-12 RX ORDER — MAGNESIUM SULFATE HEPTAHYDRATE 500 MG/ML
INJECTION, SOLUTION INTRAMUSCULAR; INTRAVENOUS AS NEEDED
Status: DISCONTINUED | OUTPATIENT
Start: 2022-01-12 | End: 2022-01-12 | Stop reason: SURG

## 2022-01-12 RX ORDER — OXYCODONE HYDROCHLORIDE 5 MG/1
5 TABLET ORAL ONCE AS NEEDED
Status: DISCONTINUED | OUTPATIENT
Start: 2022-01-12 | End: 2022-01-12 | Stop reason: HOSPADM

## 2022-01-12 RX ORDER — PHENYLEPHRINE HYDROCHLORIDE 10 MG/ML
INJECTION INTRAVENOUS AS NEEDED
Status: DISCONTINUED | OUTPATIENT
Start: 2022-01-12 | End: 2022-01-12 | Stop reason: SURG

## 2022-01-12 RX ORDER — IPRATROPIUM BROMIDE AND ALBUTEROL SULFATE 2.5; .5 MG/3ML; MG/3ML
3 SOLUTION RESPIRATORY (INHALATION)
Status: DISPENSED | OUTPATIENT
Start: 2022-01-12 | End: 2022-01-14

## 2022-01-12 RX ORDER — HYDROMORPHONE HYDROCHLORIDE 1 MG/ML
0.5 INJECTION, SOLUTION INTRAMUSCULAR; INTRAVENOUS; SUBCUTANEOUS
Status: DISPENSED | OUTPATIENT
Start: 2022-01-12 | End: 2022-01-19

## 2022-01-12 RX ORDER — DEXTROSE, SODIUM CHLORIDE, AND POTASSIUM CHLORIDE 5; .45; .15 G/100ML; G/100ML; G/100ML
50 INJECTION INTRAVENOUS CONTINUOUS
Status: DISCONTINUED | OUTPATIENT
Start: 2022-01-12 | End: 2022-01-13

## 2022-01-12 RX ORDER — CEFAZOLIN SODIUM 2 G/100ML
2 INJECTION, SOLUTION INTRAVENOUS ONCE
Status: COMPLETED | OUTPATIENT
Start: 2022-01-12 | End: 2022-01-12

## 2022-01-12 RX ORDER — KETAMINE HYDROCHLORIDE 10 MG/ML
INJECTION INTRAMUSCULAR; INTRAVENOUS AS NEEDED
Status: DISCONTINUED | OUTPATIENT
Start: 2022-01-12 | End: 2022-01-12 | Stop reason: SURG

## 2022-01-12 RX ORDER — ACETAMINOPHEN 500 MG
1000 TABLET ORAL ONCE
Status: DISCONTINUED | OUTPATIENT
Start: 2022-01-12 | End: 2022-01-12 | Stop reason: HOSPADM

## 2022-01-12 RX ORDER — ETOMIDATE 2 MG/ML
INJECTION INTRAVENOUS AS NEEDED
Status: DISCONTINUED | OUTPATIENT
Start: 2022-01-12 | End: 2022-01-12 | Stop reason: SURG

## 2022-01-12 RX ORDER — MAGNESIUM HYDROXIDE 1200 MG/15ML
LIQUID ORAL AS NEEDED
Status: DISCONTINUED | OUTPATIENT
Start: 2022-01-12 | End: 2022-01-12 | Stop reason: HOSPADM

## 2022-01-12 RX ORDER — HYDROMORPHONE HYDROCHLORIDE 1 MG/ML
0.25 INJECTION, SOLUTION INTRAMUSCULAR; INTRAVENOUS; SUBCUTANEOUS
Status: DISCONTINUED | OUTPATIENT
Start: 2022-01-12 | End: 2022-01-12 | Stop reason: HOSPADM

## 2022-01-12 RX ORDER — GABAPENTIN 300 MG/1
300 CAPSULE ORAL EVERY 8 HOURS SCHEDULED
Status: DISCONTINUED | OUTPATIENT
Start: 2022-01-12 | End: 2022-01-14

## 2022-01-12 RX ORDER — METOCLOPRAMIDE HYDROCHLORIDE 5 MG/ML
10 INJECTION INTRAMUSCULAR; INTRAVENOUS ONCE AS NEEDED
Status: DISCONTINUED | OUTPATIENT
Start: 2022-01-12 | End: 2022-01-12 | Stop reason: HOSPADM

## 2022-01-12 RX ORDER — SODIUM CHLORIDE 9 MG/ML
INJECTION, SOLUTION INTRAVENOUS CONTINUOUS PRN
Status: DISCONTINUED | OUTPATIENT
Start: 2022-01-12 | End: 2022-01-12 | Stop reason: SURG

## 2022-01-12 RX ORDER — AMOXICILLIN 250 MG
2 CAPSULE ORAL NIGHTLY
Status: DISCONTINUED | OUTPATIENT
Start: 2022-01-12 | End: 2022-01-19 | Stop reason: HOSPADM

## 2022-01-12 RX ORDER — MIDAZOLAM HYDROCHLORIDE 1 MG/ML
0.5 INJECTION INTRAMUSCULAR; INTRAVENOUS
Status: DISCONTINUED | OUTPATIENT
Start: 2022-01-12 | End: 2022-01-12 | Stop reason: HOSPADM

## 2022-01-12 RX ORDER — DIPHENHYDRAMINE HCL 25 MG
25 CAPSULE ORAL EVERY 4 HOURS PRN
Status: DISCONTINUED | OUTPATIENT
Start: 2022-01-12 | End: 2022-01-12 | Stop reason: HOSPADM

## 2022-01-12 RX ORDER — BUPIVACAINE HYDROCHLORIDE 2.5 MG/ML
INJECTION, SOLUTION EPIDURAL; INFILTRATION; INTRACAUDAL
Status: COMPLETED | OUTPATIENT
Start: 2022-01-12 | End: 2022-01-12

## 2022-01-12 RX ORDER — SODIUM CHLORIDE 0.9 % (FLUSH) 0.9 %
3 SYRINGE (ML) INJECTION EVERY 12 HOURS SCHEDULED
Status: DISCONTINUED | OUTPATIENT
Start: 2022-01-12 | End: 2022-01-12 | Stop reason: HOSPADM

## 2022-01-12 RX ORDER — FENTANYL CITRATE 50 UG/ML
50 INJECTION, SOLUTION INTRAMUSCULAR; INTRAVENOUS
Status: DISCONTINUED | OUTPATIENT
Start: 2022-01-12 | End: 2022-01-12 | Stop reason: HOSPADM

## 2022-01-12 RX ORDER — ONDANSETRON 2 MG/ML
4 INJECTION INTRAMUSCULAR; INTRAVENOUS ONCE AS NEEDED
Status: DISCONTINUED | OUTPATIENT
Start: 2022-01-12 | End: 2022-01-12 | Stop reason: HOSPADM

## 2022-01-12 RX ADMIN — MAGNESIUM SULFATE HEPTAHYDRATE 1 G: 500 INJECTION, SOLUTION INTRAMUSCULAR; INTRAVENOUS at 08:15

## 2022-01-12 RX ADMIN — SUGAMMADEX 200 MG: 100 INJECTION, SOLUTION INTRAVENOUS at 10:06

## 2022-01-12 RX ADMIN — CELECOXIB 100 MG: 100 CAPSULE ORAL at 12:14

## 2022-01-12 RX ADMIN — MAGNESIUM SULFATE HEPTAHYDRATE 1 G: 500 INJECTION, SOLUTION INTRAMUSCULAR; INTRAVENOUS at 08:30

## 2022-01-12 RX ADMIN — SODIUM CHLORIDE 75 ML/HR: 9 INJECTION, SOLUTION INTRAVENOUS at 00:13

## 2022-01-12 RX ADMIN — KETAMINE HYDROCHLORIDE 30 MG: 10 INJECTION INTRAMUSCULAR; INTRAVENOUS at 08:10

## 2022-01-12 RX ADMIN — ROCURONIUM BROMIDE 50 MG: 50 INJECTION INTRAVENOUS at 07:59

## 2022-01-12 RX ADMIN — PHENYLEPHRINE HYDROCHLORIDE 1 MCG/KG/MIN: 10 INJECTION INTRAVENOUS at 09:00

## 2022-01-12 RX ADMIN — ACETAMINOPHEN 1000 MG: 500 TABLET, FILM COATED ORAL at 16:45

## 2022-01-12 RX ADMIN — PHENYLEPHRINE HYDROCHLORIDE 100 MCG: 10 INJECTION, SOLUTION INTRAVENOUS at 08:56

## 2022-01-12 RX ADMIN — ETOMIDATE 16 MG: 2 INJECTION, SOLUTION INTRAVENOUS at 07:59

## 2022-01-12 RX ADMIN — BUPIVACAINE 10 ML: 13.3 INJECTION, SUSPENSION, LIPOSOMAL INFILTRATION at 06:54

## 2022-01-12 RX ADMIN — GABAPENTIN 300 MG: 300 CAPSULE ORAL at 13:42

## 2022-01-12 RX ADMIN — GABAPENTIN 300 MG: 300 CAPSULE ORAL at 23:28

## 2022-01-12 RX ADMIN — DIPHENHYDRAMINE HYDROCHLORIDE 50 MG: 50 INJECTION INTRAMUSCULAR; INTRAVENOUS at 05:42

## 2022-01-12 RX ADMIN — ACETAMINOPHEN 1000 MG: 500 TABLET, FILM COATED ORAL at 20:13

## 2022-01-12 RX ADMIN — ONDANSETRON 4 MG: 2 INJECTION INTRAMUSCULAR; INTRAVENOUS at 09:19

## 2022-01-12 RX ADMIN — PROPOFOL 50 MG: 10 INJECTION, EMULSION INTRAVENOUS at 08:06

## 2022-01-12 RX ADMIN — POTASSIUM CHLORIDE, DEXTROSE MONOHYDRATE AND SODIUM CHLORIDE 50 ML/HR: 150; 5; 450 INJECTION, SOLUTION INTRAVENOUS at 13:43

## 2022-01-12 RX ADMIN — ROCURONIUM BROMIDE 20 MG: 50 INJECTION INTRAVENOUS at 09:00

## 2022-01-12 RX ADMIN — IPRATROPIUM BROMIDE AND ALBUTEROL SULFATE 3 ML: .5; 3 SOLUTION RESPIRATORY (INHALATION) at 14:43

## 2022-01-12 RX ADMIN — KETAMINE HYDROCHLORIDE 20 MG: 10 INJECTION INTRAMUSCULAR; INTRAVENOUS at 09:29

## 2022-01-12 RX ADMIN — SODIUM CHLORIDE, POTASSIUM CHLORIDE, SODIUM LACTATE AND CALCIUM CHLORIDE: 600; 310; 30; 20 INJECTION, SOLUTION INTRAVENOUS at 07:04

## 2022-01-12 RX ADMIN — CELECOXIB 100 MG: 100 CAPSULE ORAL at 20:13

## 2022-01-12 RX ADMIN — HYDROCORTISONE SODIUM SUCCINATE 100 MG: 100 INJECTION, POWDER, FOR SOLUTION INTRAMUSCULAR; INTRAVENOUS at 07:09

## 2022-01-12 RX ADMIN — FENTANYL CITRATE 50 MCG: 0.05 INJECTION, SOLUTION INTRAMUSCULAR; INTRAVENOUS at 06:30

## 2022-01-12 RX ADMIN — HYDROMORPHONE HYDROCHLORIDE 0.5 MG: 1 INJECTION, SOLUTION INTRAMUSCULAR; INTRAVENOUS; SUBCUTANEOUS at 16:45

## 2022-01-12 RX ADMIN — MIDAZOLAM 2 MG: 1 INJECTION INTRAMUSCULAR; INTRAVENOUS at 06:30

## 2022-01-12 RX ADMIN — SODIUM CHLORIDE: 9 INJECTION, SOLUTION INTRAVENOUS at 08:15

## 2022-01-12 RX ADMIN — BUPIVACAINE HYDROCHLORIDE 20 ML: 2.5 INJECTION, SOLUTION EPIDURAL; INFILTRATION; INTRACAUDAL; PERINEURAL at 06:54

## 2022-01-12 RX ADMIN — FAMOTIDINE 20 MG: 10 INJECTION INTRAVENOUS at 07:09

## 2022-01-12 RX ADMIN — HYDROMORPHONE HYDROCHLORIDE 0.5 MG: 1 INJECTION, SOLUTION INTRAMUSCULAR; INTRAVENOUS; SUBCUTANEOUS at 13:08

## 2022-01-12 RX ADMIN — CEFAZOLIN SODIUM 2 G: 2 INJECTION, SOLUTION INTRAVENOUS at 07:30

## 2022-01-12 NOTE — ANESTHESIA POSTPROCEDURE EVALUATION
Patient: Claudio Krishna    Procedure Summary     Date: 01/12/22 Room / Location: [facility] OR  / [facility] MAIN OR    Anesthesia Start: 0751 Anesthesia Stop: 1034    Procedure: BRONCHOSCOPY, RIGHT THORACOSCOPY VIDEO ASSISTED WITH COMPLETE DECORTICATION (Right Chest) Diagnosis:       Hemothorax      (Hemothorax [J94.2])    Surgeons: Sol Andrews MD Provider: Sukhdeep Santamaria MD    Anesthesia Type: general with block ASA Status: 4          Anesthesia Type: general with block    Vitals  Vitals Value Taken Time   /74 01/12/22 1146   Temp 37.1 °C (98.8 °F) 01/12/22 1132   Pulse 86 01/12/22 1157   Resp 16 01/12/22 1145   SpO2 93 % 01/12/22 1157   Vitals shown include unvalidated device data.        Post Anesthesia Care and Evaluation    Patient location during evaluation: bedside  Pain management: adequate  Airway patency: patent  Anesthetic complications: No anesthetic complications    Cardiovascular status: acceptable  Respiratory status: acceptable  Hydration status: acceptable

## 2022-01-12 NOTE — PLAN OF CARE
Goal Outcome Evaluation:  Plan of Care Reviewed With: patient        Progress: no change       Pt in CCU for hemothorax. Pt A&O X4. VSS with pt on 2L NC. PRN morphine given for pain. Good urine output no BM. PT had allergic reaction to FFP see previous note. Plan for VATS procedure today. Pt updated on plan of care. Continue to monitor.

## 2022-01-12 NOTE — ANESTHESIA PROCEDURE NOTES
Airway  Urgency: elective    Date/Time: 1/12/2022 8:00 AM  Airway not difficult    General Information and Staff    Patient location during procedure: OR  Anesthesiologist: Sukhdeep Santamaria MD  CRNA: Brown Livingston CRNA    Indications and Patient Condition  Indications for airway management: airway protection    Preoxygenated: yes  MILS maintained throughout  Mask difficulty assessment: 2 - vent by mask + OA or adjuvant +/- NMBA    Final Airway Details  Final airway type: endotracheal airway      Successful airway: EBT - double lumen left  Cuffed: yes   Successful intubation technique: direct laryngoscopy  Facilitating devices/methods: intubating stylet  Endotracheal tube insertion site: oral  Blade: Rhiannon  Blade size: 4  EBT DL size (fr): 39  Cormack-Lehane Classification: grade I - full view of glottis  Placement verified by: chest auscultation, bronchoscopy and capnometry   Measured from: teeth  ETT/EBT  to teeth (cm): 29  Number of attempts at approach: 1  Assessment: lips, teeth, and gum same as pre-op and atraumatic intubation    Additional Comments  FOB placement verificaton

## 2022-01-12 NOTE — PERIOPERATIVE NURSING NOTE
Pt arrived at 0550, after report was given Joan KILGORE noticed rash on pts extremities and chest, CCU nurse Luis was asked to return back to preop Holding to see if pt had rash when pt left CCU. Luis KILGORE confirmed that rash was new, pt had received FFP early this AM and became symptomatic after infusion was complete (see nurses note). 2nd unit of FFP has not been given. Called Dr. Andrews at 0613 stated ok to proceed.

## 2022-01-12 NOTE — ANESTHESIA PROCEDURE NOTES
Peripheral Block    Pre-sedation assessment completed: 1/12/2022 6:40 AM    Patient reassessed immediately prior to procedure    Patient location during procedure: holding area  Start time: 1/12/2022 6:40 AM  Stop time: 1/12/2022 6:53 AM  Reason for block: at surgeon's request and post-op pain management  Performed by  Anesthesiologist: Sukhdeep Santamaria MD  Preanesthetic Checklist  Completed: patient identified, IV checked, site marked, risks and benefits discussed, surgical consent, monitors and equipment checked, pre-op evaluation and timeout performed  Prep:  Pt Position: sitting  Sterile barriers:cap, gloves and mask  Prep: ChloraPrep  Patient monitoring: blood pressure monitoring, continuous pulse oximetry and EKG  Procedure    Sedation: yes  Performed under: local infiltration  Guidance:ultrasound guided    ULTRASOUND INTERPRETATION. Using ultrasound guidance a 21 G gauge needle was placed in close proximity to the nerve, at which point, under ultrasound guidance anesthetic was injected in the area of the nerve and spread of the anesthesia was seen on ultrasound in close proximity thereto.  There were no abnormalities seen on ultrasound; a digital image was taken; and the patient tolerated the procedure with no complications. Images:still images obtained    Block Type:erector spinae block  Injection Technique:single-shot  Needle Type:echogenic  Resistance on Injection: none    Medications Used: bupivacaine liposome (EXPAREL) 1.3 % injection, 10 mL  bupivacaine PF (MARCAINE) 0.25 % injection, 20 mL  Med administered at 1/12/2022 6:54 AM      Post Assessment  Injection Assessment: negative aspiration for heme, no paresthesia on injection and incremental injection  Patient Tolerance:comfortable throughout block  Complications:no  Additional Notes  Ultrasound Interpretation:  Using ultrasound guidance the needle was placed in close proximity to the femoral nerve and anesthetic was injected in the area of the  femoral nerve and spread of the anesthetic was seen on ultrasound in close proximity thereto.  There were no abnormalities seen on ultrasound; a digital image was taken; and the patient tolerated the procedure with no complications.    Block placed for postoperative pain control per surgeon request.  Ultrasound guidance was used for needle placement, as well as to view and verify medication disbursement.

## 2022-01-12 NOTE — ADDENDUM NOTE
Addendum  created 01/12/22 1238 by Sukhdeep Santamaria MD    Attestation recorded in Intraprocedure, Intraprocedure Attestations filed

## 2022-01-12 NOTE — ANESTHESIA PROCEDURE NOTES
Arterial Line    Pre-sedation assessment completed: 1/12/2022 6:40 AM    Patient reassessed immediately prior to procedure    Patient location during procedure: holding area  Start time: 1/12/2022 6:41 AM  Stop Time:1/12/2022 6:51 AM       Line placed for hemodynamic monitoring.  Performed By   Anesthesiologist: Sukhdeep Santamaria MD  Preanesthetic Checklist  Completed: patient identified, IV checked, site marked, risks and benefits discussed, surgical consent, monitors and equipment checked, pre-op evaluation and timeout performed  Arterial Line Prep   Sterile Tech: gloves and sterile barriers  Prep: ChloraPrep  Patient monitoring: blood pressure monitoring, continuous pulse oximetry and EKG  Arterial Line Procedure   Laterality:left  Location:  radial artery  Catheter size: 20 G   Guidance: ultrasound guided  PROCEDURE NOTE/ULTRASOUND INTERPRETATION.  Using ultrasound guidance the potential vascular sites for insertion of the catheter were visualized to determine the patency of the vessel to be used for vascular access.  After selecting the appropriate site for insertion, the needle was visualized under ultrasound being inserted into the radial artery, followed by ultrasound confirmation of wire and catheter placement. There were no abnormalities seen on ultrasound; an image was taken; and the patient tolerated the procedure with no complications.   Number of attempts: 1  Successful placement: yes  Post Assessment   Dressing Type: occlusive dressing applied, secured with tape and wrist guard applied.   Complications no  Circ/Move/Sens Assessment: unchanged.   Patient Tolerance: patient tolerated the procedure well with no apparent complications  Additional Notes  Ultrasound used for needle and catheter placement into the artery.

## 2022-01-12 NOTE — NURSING NOTE
"Pt finished one unit of FFP at 0458. At 5:30 pt calls out to nurses station complaining of heart racing. Once entering room Pt HR increased from mid 80's to 100's. O2 sats decreased from high 90's to 89-90% on 2L. Pt complains \"feeling warm and can feel heart beat in lower back\". Dr. Prado notified see new orders. Second unit of FFP held. Pt taken down to pre op for VATs procedure rash developed on pt upon arrival. RN stayed in Pre op until anesthesiology and Cardiothoracic surgery notified.   "

## 2022-01-12 NOTE — OP NOTE
THORACOSCOPY VIDEO ASSISTED WITH DECORTICATION  Procedure Report    Patient Name:  Claudio Krishna  YOB: 1955    Date of Surgery:  1/12/2022     Indications: Retained hemothorax after chest tube placement    Pre-op Diagnosis:   Hemothorax [J94.2]       Post-Op Diagnosis Codes:     * Hemothorax [J94.2]    Procedure/CPT® Codes:      Procedure(s):  BRONCHOSCOPY, RIGHT THORACOSCOPY VIDEO ASSISTED WITH COMPLETE DECORTICATION    Staff:  Surgeon(s):  Sol Andrews MD    Assistant: Taran Jason CSA was responsible for performing the following activities: Retraction, Suction, Irrigation, Closing, Placing Dressing and Held/Positioned Camera and their skilled assistance was necessary for the success of this case.     Anesthesia: General with Block    Estimated Blood Loss: 250 mL    Implants:    Nothing was implanted during the procedure    Specimen:          None      Findings: Large clot burden, approximately 1.5 L of old clotted blood evacuated from the chest cavity    Complications: None apparent    Description of Procedure: Claudio Krishna was identified in the preoperative holding area and again his consent for the procedure was verified.  The patient was transported to the operating room and placed on the operating room table in supine position.  A general anesthetic was successfully administered and He was intubated with a double lumen endotracheal tube without difficulty.  Placement of the double lumen was confirmed with a video bronchoscope examination.  A time out was performed to verify correct patient, correct procedure and the correct administration of IV antibiotics per [facility] protocol.     The patient was placed in left lateral decubitus position, right side up and all pressure points were padded.  The patient was prepped and draped in standard sterile fashion.  An approximately 2cm incision was made in the 8th intercostal space midaxillary line, dissection was carried down into the chest cavity  using Bovie electrocautery under direct vision.  An barrera wound protector was placed into the incision.  The camera was inserted into the chest and a large amount of old blood and clot were identified.    A second approximately 2cm incision was made in the anterior axillary line 5th intercostal space and the chest was entered and the rib space was opened under direct visualization.     There was a large amount of clot between the lower lobe and diaphragm and covering the entirety of the lower lobe.  This clot was mobilized using blunt dissection.  The lower lobe was adherent to the diaphragm and was mobilized its entirety.  The lung was mobilized from the anterior chest wall as well as the anterior lateral chest wall to provide visualization.  There was a thin rind/clot covering the entirety of the lower and middle lobes which was mobilized using blunt dissection.  Once all of the clot was evacuated, the chest cavity was irrigated with water.  There was no evidence of any active bleeding.  The lung expanded nicely to fill the chest cavity.  Two 28 Romanian chest tubes were placed in the 6th and 8th intercostal space incision. The lung was reexpanded under direct visualization.  The incisions were closed using deep vicryl sutures and Monocryl for the skin in standard fashion.  The chest tube was secured to the skin.      The patient tolerated this procedure well and was extubated and transported to the recovery room in stable condition.  The counts were correct at the end of the case.           Sol Andrews MD     Date: 1/12/2022  Time: 10:19 EST

## 2022-01-12 NOTE — ANESTHESIA PREPROCEDURE EVALUATION
Anesthesia Evaluation     Patient summary reviewed and Nursing notes reviewed   NPO Solid Status: > 8 hours  NPO Liquid Status: > 2 hours           Airway   Mallampati: II  TM distance: >3 FB  Neck ROM: full  Dental - normal exam     Pulmonary - negative pulmonary ROS and normal exam   Cardiovascular - normal exam    ECG reviewed    (+) hypertension, CAD,       Neuro/Psych- negative ROS  GI/Hepatic/Renal/Endo    (+) obesity,       Musculoskeletal (-) negative ROS    Abdominal    Substance History - negative use     OB/GYN negative ob/gyn ROS         Other                        Anesthesia Plan    ASA 4     general with block       Anesthetic plan, all risks, benefits, and alternatives have been provided, discussed and informed consent has been obtained with: patient.

## 2022-01-12 NOTE — PROGRESS NOTES
Han Seaman MD                          [phone]      Patient ID:    Name:  Claudio Krishna    MRN:  [id number]    1955   66 y.o.  male            Patient Care Team:  Shayan Celeste MD as PCP - General (Family Medicine)    CC/ Reason for visit: Acute respiratory failure, spontaneous right abdominal wall hematoma thought to be from coughing, worsening right hemothorax, cardiac arrest    Subjective: Pt seen and examined this AM.  Overnight events noted.  Some issues with FFP resulting in some rash but overall has done well and underwent the procedure.  Operative report reviewed and large clot removed from the right chest.  Chest tubes in place now.  Patient on low-dose supplemental oxygen.  Having some postop confusion.    ROS: Complains of some chest soreness but otherwise no nausea vomiting or diarrhea.  Denies any palpitations     Objective     Vital Signs past 24hrs    BP range: BP: (129-175)/(59-84) 132/71  Pulse range: Heart Rate:  [] 82  Resp rate range: Resp:  [16-18] 16  Temp range: Temp (24hrs), Av °F (37.2 °C), Min:98.4 °F (36.9 °C), Max:100.2 °F (37.9 °C)      Ventilator/Non-Invasive Ventilation Settings (From admission, onward)            None          Device (Oxygen Therapy): nasal cannula       108 kg (237 lb 7 oz); Body mass index is 34.07 kg/m².      Intake/Output Summary (Last 24 hours) at 2022 1444  Last data filed at 2022 1145  Gross per 24 hour   Intake 5613 ml   Output 1330 ml   Net 4283 ml       PHYSICAL EXAM   Constitutional: Middle aged pt in bed, No acute respiratory distress, + accessory muscle use  Head: - NCAT  Eyes: No pallor.  Anicteric sclerae, EOMI.  ENMT:  Mallampati 3, no oral thrush. Moist MM.   NECK: Trachea midline, No thyromegaly, no palpable cervical lymphadenopathy  Heart: RRR, no murmur. No pedal edema   Lungs: MAYE +, right-sided chest tubes +. No wheezes/ crackles heard    Abdomen: Soft. No tenderness,  guarding or rigidity. No palpable masses  Extremities: Extremities warm and well perfused. No cyanosis/ clubbing  Neuro: Conscious, answers appropriately, no gross focal neuro deficits  Psych: Mood and affect appropriate    PPE recommended per [facility] infectious disease Isolation protocol for the current clinical scenario(as mentioned below) was followed.     Scheduled meds:  acetaminophen, 1,000 mg, Oral, TID  atorvastatin, 10 mg, Oral, Daily  celecoxib, 100 mg, Oral, Q12H  docusate sodium, 100 mg, Oral, BID  gabapentin, 300 mg, Oral, Q8H  [START ON 1/13/2022] heparin (porcine), 5,000 Units, Subcutaneous, Q8H  ipratropium-albuterol, 3 mL, Nebulization, Q8H - RT  polyethylene glycol, 17 g, Oral, Daily  senna-docusate sodium, 2 tablet, Oral, Nightly        IV meds:                      dextrose 5 % and sodium chloride 0.45 % with KCl 20 mEq/L, 50 mL/hr, Last Rate: 50 mL/hr (01/12/22 1343)  hold, 1 each  lactated ringers, 9 mL/hr, Last Rate: 9 mL/hr (01/12/22 0704)        Data Review:      Results from last 7 days   Lab Units 01/12/22  1101 01/12/22  0349 01/12/22  0016 01/11/22  1204 01/11/22  0410 01/10/22  1140 01/10/22  0537 01/10/22  0257 01/10/22  0257 01/07/22  0558 01/06/22  0509   SODIUM mmol/L 131* 132*  --   --  130*  --   --    < > 129*   < > 131*   POTASSIUM mmol/L 4.6 4.3  --   --  4.3  --   --    < > 4.7   < > 4.6   CHLORIDE mmol/L 100 97*  --   --  97*  --   --    < > 96*   < > 94*   CO2 mmol/L 24.8 25.4  --   --  25.9  --   --    < > 24.4   < > 28.9   BUN mg/dL 11 11  --   --  15  --   --    < > 14   < > 12   CREATININE mg/dL 0.56* 0.55*  --   --  0.64*  --   --    < > 0.87   < > 0.72*   CALCIUM mg/dL 7.9* 8.2*  --   --  8.5*  --   --    < > 8.1*   < > 8.8   BILIRUBIN mg/dL  --   --   --   --   --   --   --   --  0.9  --   --    ALK PHOS U/L  --   --   --   --   --   --   --   --  58  --   --    ALT (SGPT) U/L  --   --   --   --   --   --   --   --  22  --   --    AST (SGOT) U/L  --   --   --    --   --   --   --   --  22  --   --    GLUCOSE mg/dL 163* 109*  --   --  124*  --   --    < > 173*   < > 119*   WBC 10*3/mm3 8.94 9.21  --   --   --   --  16.13*   < > 14.46*   < > 9.74   HEMOGLOBIN g/dL 11.3* 7.4* 7.4*   < > 7.8*   < > 7.5*   < > 8.1*   < > 9.0*   PLATELETS 10*3/mm3 265 249  --   --   --   --  392   < > 360   < > 323   INR   --  1.94*  --   --  2.09*  --  1.98*  --   --    < > 1.27*   PROCALCITONIN ng/mL  --   --   --   --   --   --   --   --   --   --  0.06    < > = values in this interval not displayed.       Lab Results   Component Value Date    CALCIUM 7.9 (L) 01/12/2022    PHOS 3.2 01/03/2022                    Results Review:    I have reviewed the relevant laboratory results and independently reviewed the chest imaging from this hospitalization including the available echocardiogram reports personally and summarized it if/ when appropriate below    Assessment    Resuscitated PEA arrest; brief - 1/10  Acute hypoxic respiratory failure  Right hemothorax s/p emergent Rt CT 1/10  S/p VATS 1/12  Acute blood loss anemia s/p PRBC  Hypovolemic hypotension  Abdominal wall hematoma; improved -spontaneous  Coumadin coagulopathy  Mechanical aortic valve s/p full AC; held now  Hyponatremia    PLAN:  Patient is stable from a respiratory standpoint postprocedure and is on low supplemental oxygen.  Hemodynamically stable as well.  Blood pressure appropriate.  Did receive transfusions and hemoglobin is stable.  Large clot was removed from the chest. Thoracic surgery plan noted.  Cardiology input noted. Will defer anticoagulation plan to cardiology/ cardiothoracic surgery.  Hyponatremia is stable and will continue to monitor closely  Advance diet  Guarded prognosis  Full code    Awaiting bed in 5E. Ok to tx out from my end.     Han Seaman MD  1/12/2022

## 2022-01-13 ENCOUNTER — APPOINTMENT (OUTPATIENT)
Dept: GENERAL RADIOLOGY | Facility: HOSPITAL | Age: 67
End: 2022-01-13

## 2022-01-13 LAB
ANION GAP SERPL CALCULATED.3IONS-SCNC: 7.9 MMOL/L (ref 5–15)
BASE EXCESS BLDA CALC-SCNC: 0 MMOL/L (ref -5–5)
BASOPHILS # BLD AUTO: 0.01 10*3/MM3 (ref 0–0.2)
BASOPHILS NFR BLD AUTO: 0.1 % (ref 0–1.5)
BH BB BLOOD EXPIRATION DATE: NORMAL
BH BB BLOOD TYPE BARCODE: 5100
BH BB DISPENSE STATUS: NORMAL
BH BB PRODUCT CODE: NORMAL
BH BB UNIT NUMBER: NORMAL
BUN SERPL-MCNC: 11 MG/DL (ref 8–23)
BUN/CREAT SERPL: 20.8 (ref 7–25)
CA-I BLDA-SCNC: ABNORMAL MMOL/L
CALCIUM SPEC-SCNC: 8.3 MG/DL (ref 8.6–10.5)
CHLORIDE SERPL-SCNC: 100 MMOL/L (ref 98–107)
CO2 BLDA-SCNC: 26 MMOL/L (ref 24–29)
CO2 SERPL-SCNC: 26.1 MMOL/L (ref 22–29)
CREAT SERPL-MCNC: 0.53 MG/DL (ref 0.76–1.27)
CROSSMATCH INTERPRETATION: NORMAL
DEPRECATED RDW RBC AUTO: 44 FL (ref 37–54)
EOSINOPHIL # BLD AUTO: 0 10*3/MM3 (ref 0–0.4)
EOSINOPHIL NFR BLD AUTO: 0 % (ref 0.3–6.2)
ERYTHROCYTE [DISTWIDTH] IN BLOOD BY AUTOMATED COUNT: 13.4 % (ref 12.3–15.4)
GFR SERPL CREATININE-BSD FRML MDRD: >150 ML/MIN/1.73
GLUCOSE BLDC GLUCOMTR-MCNC: 132 MG/DL (ref 70–130)
GLUCOSE BLDC GLUCOMTR-MCNC: 146 MG/DL (ref 70–130)
GLUCOSE BLDC GLUCOMTR-MCNC: 152 MG/DL (ref 70–130)
GLUCOSE SERPL-MCNC: 144 MG/DL (ref 65–99)
HCO3 BLDA-SCNC: 24.5 MMOL/L (ref 22–26)
HCT VFR BLD AUTO: 33.2 % (ref 37.5–51)
HCT VFR BLD AUTO: 34.2 % (ref 37.5–51)
HCT VFR BLD AUTO: 34.2 % (ref 37.5–51)
HCT VFR BLDA CALC: 25 % (ref 38–51)
HGB BLD-MCNC: 11.2 G/DL (ref 13–17.7)
HGB BLD-MCNC: 11.2 G/DL (ref 13–17.7)
HGB BLD-MCNC: 11.5 G/DL (ref 13–17.7)
HGB BLDA-MCNC: 8.5 G/DL (ref 12–17)
IMM GRANULOCYTES # BLD AUTO: 0.06 10*3/MM3 (ref 0–0.05)
IMM GRANULOCYTES NFR BLD AUTO: 0.6 % (ref 0–0.5)
INR PPP: 2.2 (ref 0.9–1.1)
LYMPHOCYTES # BLD AUTO: 0.51 10*3/MM3 (ref 0.7–3.1)
LYMPHOCYTES NFR BLD AUTO: 5 % (ref 19.6–45.3)
MCH RBC QN AUTO: 30.1 PG (ref 26.6–33)
MCHC RBC AUTO-ENTMCNC: 32.7 G/DL (ref 31.5–35.7)
MCV RBC AUTO: 91.9 FL (ref 79–97)
MONOCYTES # BLD AUTO: 0.61 10*3/MM3 (ref 0.1–0.9)
MONOCYTES NFR BLD AUTO: 5.9 % (ref 5–12)
NEUTROPHILS NFR BLD AUTO: 88.4 % (ref 42.7–76)
NEUTROPHILS NFR BLD AUTO: 9.11 10*3/MM3 (ref 1.7–7)
NRBC BLD AUTO-RTO: 0 /100 WBC (ref 0–0.2)
PCO2 BLDA: 38.8 MM HG (ref 35–45)
PH BLDA: 7.41 PH UNITS (ref 7.35–7.6)
PLATELET # BLD AUTO: 248 10*3/MM3 (ref 140–450)
PMV BLD AUTO: 9.1 FL (ref 6–12)
PO2 BLDA: 96 MMHG (ref 80–105)
POTASSIUM BLDA-SCNC: 4.5 MMOL/L (ref 3.5–4.9)
POTASSIUM SERPL-SCNC: 4.6 MMOL/L (ref 3.5–5.2)
PROTHROMBIN TIME: 24.2 SECONDS (ref 11.7–14.2)
RBC # BLD AUTO: 3.72 10*6/MM3 (ref 4.14–5.8)
SAO2 % BLDA: 98 % (ref 95–98)
SODIUM SERPL-SCNC: 134 MMOL/L (ref 136–145)
UNIT  ABO: NORMAL
UNIT  RH: NORMAL
WBC NRBC COR # BLD: 10.3 10*3/MM3 (ref 3.4–10.8)

## 2022-01-13 PROCEDURE — 25010000002 HEPARIN (PORCINE) PER 1000 UNITS: Performed by: NURSE PRACTITIONER

## 2022-01-13 PROCEDURE — 85014 HEMATOCRIT: CPT | Performed by: NURSE PRACTITIONER

## 2022-01-13 PROCEDURE — 85018 HEMOGLOBIN: CPT | Performed by: NURSE PRACTITIONER

## 2022-01-13 PROCEDURE — 94799 UNLISTED PULMONARY SVC/PX: CPT

## 2022-01-13 PROCEDURE — 25010000002 HEPARIN (PORCINE) PER 1000 UNITS: Performed by: THORACIC SURGERY (CARDIOTHORACIC VASCULAR SURGERY)

## 2022-01-13 PROCEDURE — 94761 N-INVAS EAR/PLS OXIMETRY MLT: CPT

## 2022-01-13 PROCEDURE — 82962 GLUCOSE BLOOD TEST: CPT

## 2022-01-13 PROCEDURE — 85018 HEMOGLOBIN: CPT | Performed by: THORACIC SURGERY (CARDIOTHORACIC VASCULAR SURGERY)

## 2022-01-13 PROCEDURE — 94760 N-INVAS EAR/PLS OXIMETRY 1: CPT

## 2022-01-13 PROCEDURE — 85610 PROTHROMBIN TIME: CPT | Performed by: THORACIC SURGERY (CARDIOTHORACIC VASCULAR SURGERY)

## 2022-01-13 PROCEDURE — 85014 HEMATOCRIT: CPT | Performed by: THORACIC SURGERY (CARDIOTHORACIC VASCULAR SURGERY)

## 2022-01-13 PROCEDURE — 80048 BASIC METABOLIC PNL TOTAL CA: CPT | Performed by: THORACIC SURGERY (CARDIOTHORACIC VASCULAR SURGERY)

## 2022-01-13 PROCEDURE — 97530 THERAPEUTIC ACTIVITIES: CPT

## 2022-01-13 PROCEDURE — 99024 POSTOP FOLLOW-UP VISIT: CPT | Performed by: NURSE PRACTITIONER

## 2022-01-13 PROCEDURE — 71045 X-RAY EXAM CHEST 1 VIEW: CPT

## 2022-01-13 PROCEDURE — 85025 COMPLETE CBC W/AUTO DIFF WBC: CPT | Performed by: THORACIC SURGERY (CARDIOTHORACIC VASCULAR SURGERY)

## 2022-01-13 PROCEDURE — 97162 PT EVAL MOD COMPLEX 30 MIN: CPT

## 2022-01-13 RX ADMIN — DOCUSATE SODIUM 100 MG: 100 CAPSULE, LIQUID FILLED ORAL at 08:12

## 2022-01-13 RX ADMIN — HEPARIN SODIUM 5000 UNITS: 5000 INJECTION INTRAVENOUS; SUBCUTANEOUS at 21:18

## 2022-01-13 RX ADMIN — IPRATROPIUM BROMIDE AND ALBUTEROL SULFATE 3 ML: .5; 3 SOLUTION RESPIRATORY (INHALATION) at 23:15

## 2022-01-13 RX ADMIN — IPRATROPIUM BROMIDE AND ALBUTEROL SULFATE 3 ML: .5; 3 SOLUTION RESPIRATORY (INHALATION) at 14:18

## 2022-01-13 RX ADMIN — CELECOXIB 100 MG: 100 CAPSULE ORAL at 21:17

## 2022-01-13 RX ADMIN — POLYETHYLENE GLYCOL 3350 17 G: 17 POWDER, FOR SOLUTION ORAL at 08:13

## 2022-01-13 RX ADMIN — DOCUSATE SODIUM 50MG AND SENNOSIDES 8.6MG 2 TABLET: 8.6; 5 TABLET, FILM COATED ORAL at 21:17

## 2022-01-13 RX ADMIN — SODIUM CHLORIDE, POTASSIUM CHLORIDE, SODIUM LACTATE AND CALCIUM CHLORIDE 9 ML/HR: 600; 310; 30; 20 INJECTION, SOLUTION INTRAVENOUS at 05:53

## 2022-01-13 RX ADMIN — GABAPENTIN 300 MG: 300 CAPSULE ORAL at 17:32

## 2022-01-13 RX ADMIN — GABAPENTIN 300 MG: 300 CAPSULE ORAL at 05:53

## 2022-01-13 RX ADMIN — ACETAMINOPHEN 1000 MG: 500 TABLET, FILM COATED ORAL at 17:32

## 2022-01-13 RX ADMIN — ACETAMINOPHEN 1000 MG: 500 TABLET, FILM COATED ORAL at 21:17

## 2022-01-13 RX ADMIN — CELECOXIB 100 MG: 100 CAPSULE ORAL at 08:12

## 2022-01-13 RX ADMIN — ATORVASTATIN CALCIUM 10 MG: 20 TABLET, FILM COATED ORAL at 08:12

## 2022-01-13 RX ADMIN — IPRATROPIUM BROMIDE AND ALBUTEROL SULFATE 3 ML: .5; 3 SOLUTION RESPIRATORY (INHALATION) at 00:01

## 2022-01-13 RX ADMIN — HEPARIN SODIUM 5000 UNITS: 5000 INJECTION INTRAVENOUS; SUBCUTANEOUS at 17:31

## 2022-01-13 RX ADMIN — HEPARIN SODIUM 5000 UNITS: 5000 INJECTION INTRAVENOUS; SUBCUTANEOUS at 05:53

## 2022-01-13 RX ADMIN — DOCUSATE SODIUM 100 MG: 100 CAPSULE, LIQUID FILLED ORAL at 21:17

## 2022-01-13 RX ADMIN — GABAPENTIN 300 MG: 300 CAPSULE ORAL at 21:17

## 2022-01-13 RX ADMIN — IPRATROPIUM BROMIDE AND ALBUTEROL SULFATE 3 ML: .5; 3 SOLUTION RESPIRATORY (INHALATION) at 08:19

## 2022-01-13 RX ADMIN — ACETAMINOPHEN 1000 MG: 500 TABLET, FILM COATED ORAL at 08:12

## 2022-01-13 NOTE — PLAN OF CARE
Goal Outcome Evaluation:  Plan of Care Reviewed With: patient           Outcome Summary: Pt is a 67 yo M who is post-op VAT. Pt presents to PT with some impaired functional mobiltiy and gait secondary to some generalized weakness and decreased activity tolerance post-op. Pt may benefit from skilled PT to address strength, mobility, and gait.

## 2022-01-13 NOTE — THERAPY EVALUATION
Patient Name: Claudio Krishna  : 1955    MRN: [id number]                              Today's Date: 2022       Admit Date: 2022    Visit Dx:     ICD-10-CM ICD-9-CM   1. Abdominal wall hematoma, initial encounter  S30.1XXA 922.2   2. Hemothorax on right  J94.2 511.89   3. Chronic anticoagulation  Z79.01 V58.61   4. Mechanical heart valve present  Z95.2 V43.3   5. Hemothorax  J94.2 511.89     Patient Active Problem List   Diagnosis   • Abdominal wall hematoma   • Coronary artery disease   • Mechanical heart valve present   • Hemothorax   • Essential hypertension     Past Medical History:   Diagnosis Date   • Coronary artery disease    • Hypertension      Past Surgical History:   Procedure Laterality Date   • AORTA SURGERY  2011   • HERNIA REPAIR     • KNEE MINI REVISION     • THORACOSCOPY WITH DECORTICATION Right 2022    Procedure: BRONCHOSCOPY, RIGHT THORACOSCOPY VIDEO ASSISTED WITH COMPLETE DECORTICATION;  Surgeon: Sol Andrews MD;  Location: [facility];  Service: Thoracic;  Laterality: Right;      General Information     Row Name 22 164          Physical Therapy Time and Intention    Document Type re-evaluation  -     Mode of Treatment individual therapy; physical therapy  -     Row Name 22 164          General Information    Prior Level of Function independent:; gait; transfer; bed mobility  -     Existing Precautions/Restrictions fall  -     Barriers to Rehab none identified  -     Row Name 22 164          Living Environment    Lives With spouse  -     Row Name 22 164          Home Main Entrance    Number of Stairs, Main Entrance six  -     Row Name 22 164          Cognition    Orientation Status (Cognition) oriented x 4  -     Row Name 22 1649 22 164       Safety Issues, Functional Mobility    Impairments Affecting Function (Mobility) -- endurance/activity tolerance  -    Comment, Safety Issues/Impairments  (Mobility) RN clamped chest tubes for ambulation  - --          User Key  (r) = Recorded By, (t) = Taken By, (c) = Cosigned By    Initials Name Provider Type    Sherley Rodriguez PT Physical Therapist               Mobility     Row Name 01/13/22 1644          Bed Mobility    Bed Mobility supine-sit; sit-supine  -     Supine-Sit [place] (Bed Mobility) contact guard; verbal cues  -     Sit-Supine [place] (Bed Mobility) contact guard; verbal cues  -     Row Name 01/13/22 1644          Sit-Stand Transfer    Sit-Stand [place] (Transfers) verbal cues; nonverbal cues (demo/gesture); contact guard  -     Assistive Device (Sit-Stand Transfers) walker, front-wheeled  -CH     Row Name 01/13/22 1644          Gait/Stairs (Locomotion)    [place] Level (Gait) verbal cues; nonverbal cues (demo/gesture); contact guard  -     Assistive Device (Gait) walker, front-wheeled  -CH     Distance in Feet (Gait) 150  -CH     Deviations/Abnormal Patterns (Gait) maida decreased; gait speed decreased; stride length decreased  -           User Key  (r) = Recorded By, (t) = Taken By, (c) = Cosigned By    Initials Name Provider Type     Sherley Bob PT Physical Therapist               Obj/Interventions     Row Name 01/13/22 1645          Range of Motion Comprehensive    General Range of Motion no range of motion deficits identified  -     Row Name 01/13/22 1645          Strength Comprehensive (MMT)    General Manual Muscle Testing (MMT) Assessment no strength deficits identified  -     Row Name 01/13/22 1645          Balance    Balance Assessment standing static balance; standing dynamic balance  -     Static Standing Balance WFL  -     Dynamic Standing Balance WFL  -           User Key  (r) = Recorded By, (t) = Taken By, (c) = Cosigned By    Initials Name Provider Type    Sherley Rodriguez PT Physical Therapist               Goals/Plan     Row Name 01/13/22 1649          Bed Mobility  Goal 1 (PT)    Activity/Assistive Device (Bed Mobility Goal 1, PT) bed mobility activities, all  -CH     [place] Level/Cues Needed (Bed Mobility Goal 1, PT) supervision required  -     Time Frame (Bed Mobility Goal 1, PT) 1 week  -     Row Name 01/13/22 1643          Transfer Goal 1 (PT)    Activity/Assistive Device (Transfer Goal 1, PT) transfers, all  -CH     [place] Level/Cues Needed (Transfer Goal 1, PT) supervision required  -CH     Time Frame (Transfer Goal 1, PT) 1 week  -     Row Name 01/13/22 1648          Gait Training Goal 1 (PT)    Activity/Assistive Device (Gait Training Goal 1, PT) gait (walking locomotion)  -CH     [place] Level (Gait Training Goal 1, PT) supervision required  -     Distance (Gait Training Goal 1, PT) 300  -CH     Time Frame (Gait Training Goal 1, PT) 1 week  -           User Key  (r) = Recorded By, (t) = Taken By, (c) = Cosigned By    Initials Name Provider Type     Sherley Bob, PT Physical Therapist               Clinical Impression     Row Name 01/13/22 1644          Pain    Additional Documentation Pain Scale: FACES Pre/Post-Treatment (Group)  -     Row Name 01/13/22 1646          Pain Scale: Numbers Pre/Post-Treatment    Pretreatment Pain Rating 0/10 - no pain  -     Posttreatment Pain Rating 0/10 - no pain  -     Row Name 01/13/22 1643          Plan of Care Review    Plan of Care Reviewed With patient  -CH     Outcome Summary Pt is a 67 yo M who is post-op VAT. Pt presents to PT with some impaired functional mobiltiy and gait secondary to some generalized weakness and decreased activity tolerance post-op. Pt may benefit from skilled PT to address strength, mobility, and gait.  -     Row Name 01/13/22 8776          Therapy Assessment/Plan (PT)    Patient/Family Therapy Goals Statement (PT) to return to PLOF  -     Rehab Potential (PT) good, to achieve stated therapy goals  -     Criteria for Skilled Interventions Met (PT) skilled  treatment is necessary  -     Row Name 01/13/22 1646          Positioning and Restraints    Pre-Treatment Position in bed  -     Post Treatment Position other  -CH     Other Position with nsg  Pt continuing ambulation with nursing staff.  -           User Key  (r) = Recorded By, (t) = Taken By, (c) = Cosigned By    Initials Name Provider Type    Sherley Rodriguez PT Physical Therapist               Outcome Measures     Row Name 01/13/22 1650          How much help from another person do you currently need...    Turning from your back to your side while in flat bed without using bedrails? 3  -CH     Moving from lying on back to sitting on the side of a flat bed without bedrails? 3  -CH     Moving to and from a bed to a chair (including a wheelchair)? 3  -CH     Standing up from a chair using your arms (e.g., wheelchair, bedside chair)? 3  -CH     Climbing 3-5 steps with a railing? 3  -CH     To walk in hospital room? 3  -CH     AM-PAC 6 Clicks Score (PT) 18  -     Row Name 01/13/22 1650          Functional Assessment    Outcome Measure Options AM-PAC 6 Clicks Basic Mobility (PT)  -           User Key  (r) = Recorded By, (t) = Taken By, (c) = Cosigned By    Initials Name Provider Type    Sherley Rodriguez PT Physical Therapist                             Physical Therapy Education                 Title: PT OT SLP Therapies (In Progress)     Topic: Physical Therapy (In Progress)     Point: Mobility training (Done)     Learning Progress Summary           Patient Acceptance, E,TB,D, VU,NR by  at 1/13/2022 1650                   Point: Home exercise program (Not Started)     Learner Progress:  Not documented in this visit.          Point: Body mechanics (Done)     Learning Progress Summary           Patient Acceptance, E,TB,D, VU,NR by  at 1/13/2022 1650                   Point: Precautions (Done)     Learning Progress Summary           Patient Acceptance, E,TB,D, VU,NR by  at 1/13/2022 1650                                User Key     Initials Effective Dates Name Provider Type Discipline     06/16/21 -  Sherley Bob PT Physical Therapist PT              PT Recommendation and Plan  Planned Therapy Interventions (PT): balance training, bed mobility training, gait training, home exercise program, patient/family education, strengthening, transfer training  Plan of Care Reviewed With: patient  Outcome Summary: Pt is a 65 yo M who is post-op VAT. Pt presents to PT with some impaired functional mobiltiy and gait secondary to some generalized weakness and decreased activity tolerance post-op. Pt may benefit from skilled PT to address strength, mobility, and gait.     Time Calculation:    PT Charges     Row Name 01/13/22 1651             Time Calculation    Start Time 1614  -      Stop Time 1630  -      Time Calculation (min) 16 min  -      PT Received On 01/13/22  -      PT - Next Appointment 01/14/22  -      PT Goal Re-Cert Due Date 01/20/22  -              Time Calculation- PT    Total Timed Code Minutes- PT 8 minute(s)  -CH              Timed Charges    94572 - PT Therapeutic Activity Minutes 8  -CH              Untimed Charges    PT Eval/Re-eval Minutes 10  -CH              Total Minutes    Timed Charges Total Minutes 8  -CH      Untimed Charges Total Minutes 10  -CH       Total Minutes 18  -CH            User Key  (r) = Recorded By, (t) = Taken By, (c) = Cosigned By    Initials Name Provider Type     Sherley Bob PT Physical Therapist              Therapy Charges for Today     Code Description Service Date Service Provider Modifiers Qty    83857011740 HC PT THERAPEUTIC ACT EA 15 MIN 1/13/2022 Sherley Bob, PT GP 1    97646089007 HC PT EVAL MOD COMPLEXITY 2 1/13/2022 Sherley Bob, PT GP 1          PT G-Codes  Outcome Measure Options: AM-PAC 6 Clicks Basic Mobility (PT)  AM-PAC 6 Clicks Score (PT): 18    Sherley Bob PT  1/13/2022

## 2022-01-13 NOTE — PLAN OF CARE
Goal Outcome Evaluation:           Progress: improving  Outcome Summary: AS/VS as charted.. Pt arrived back to the unit on 3LNC. CT x2 output and drainaged on dressing marked and noted. resting w/ spouse at bedside. Tolerating po. c/o pain w/ cough and deep breathing and repositioning .. 4 unit of PRBC completed on unit. Hgb remains stable.. . CLAROS equally.. no acute events noted.. current plan of care continued..

## 2022-01-13 NOTE — PROGRESS NOTES
Han Seaman MD                          [phone]      Patient ID:    Name:  Claudio Krishna    MRN:  [id number]    1955   66 y.o.  male            Patient Care Team:  Shayan Celeste MD as PCP - General (Family Medicine)    CC/ Reason for visit: Acute respiratory failure, spontaneous right abdominal wall hematoma thought to be from coughing, worsening right hemothorax, cardiac arrest    Subjective: Pt seen and examined this AM.  Patient on low-dose supplemental oxygen. Did well overnight. Multiple rbc transfusion to hb >11     ROS: Complains of some chest soreness but otherwise no nausea vomiting or diarrhea.  Denies any palpitations     Objective     Vital Signs past 24hrs    BP range: BP: (121-163)/(63-84) 140/68  Pulse range: Heart Rate:  [57-92] 59  Resp rate range: Resp:  [16-20] 20  Temp range: Temp (24hrs), Av.7 °F (37.1 °C), Min:97.6 °F (36.4 °C), Max:100.2 °F (37.9 °C)      Ventilator/Non-Invasive Ventilation Settings (From admission, onward)            None          Device (Oxygen Therapy): nasal cannula       106 kg (233 lb 7.5 oz); Body mass index is 33.5 kg/m².      Intake/Output Summary (Last 24 hours) at 2022 0730  Last data filed at 2022 0400  Gross per 24 hour   Intake 4132.17 ml   Output 2667 ml   Net 1465.17 ml       PHYSICAL EXAM   Constitutional: Middle aged pt in bed, No acute respiratory distress, + accessory muscle use  Head: - NCAT  Eyes: No pallor.  Anicteric sclerae, EOMI.  ENMT:  Mallampati 3, no oral thrush. Moist MM.   NECK: Trachea midline, No thyromegaly, no palpable cervical lymphadenopathy  Heart: RRR, no murmur. No pedal edema   Lungs: MAYE +, right-sided chest tubes +. No wheezes/ crackles heard    Abdomen: Soft. No tenderness, guarding or rigidity. No palpable masses  Extremities: Extremities warm and well perfused. No cyanosis/ clubbing  Neuro: Conscious, answers appropriately, no gross focal neuro deficits  Psych:  Mood and affect appropriate    PPE recommended per [facility] infectious disease Isolation protocol for the current clinical scenario(as mentioned below) was followed.     Scheduled meds:  acetaminophen, 1,000 mg, Oral, TID  atorvastatin, 10 mg, Oral, Daily  celecoxib, 100 mg, Oral, Q12H  docusate sodium, 100 mg, Oral, BID  gabapentin, 300 mg, Oral, Q8H  heparin (porcine), 5,000 Units, Subcutaneous, Q8H  ipratropium-albuterol, 3 mL, Nebulization, Q8H - RT  polyethylene glycol, 17 g, Oral, Daily  senna-docusate sodium, 2 tablet, Oral, Nightly        IV meds:                      hold, 1 each  lactated ringers, 9 mL/hr, Last Rate: 9 mL/hr (01/13/22 0553)        Data Review:      Results from last 7 days   Lab Units 01/13/22  0405 01/12/22  2333 01/12/22  1810 01/12/22  1517 01/12/22  1101 01/12/22  0349 01/12/22  0349 01/11/22  1204 01/11/22  0410 01/10/22  0537 01/10/22  0257   SODIUM mmol/L 134*  --   --   --  131*  --  132*  --  130*   < > 129*   POTASSIUM mmol/L 4.6  --   --   --  4.6  --  4.3  --  4.3   < > 4.7   CHLORIDE mmol/L 100  --   --   --  100  --  97*  --  97*   < > 96*   CO2 mmol/L 26.1  --   --   --  24.8  --  25.4  --  25.9   < > 24.4   BUN mg/dL 11  --   --   --  11  --  11  --  15   < > 14   CREATININE mg/dL 0.53*  --   --   --  0.56*  --  0.55*  --  0.64*   < > 0.87   CALCIUM mg/dL 8.3*  --   --   --  7.9*  --  8.2*  --  8.5*   < > 8.1*   BILIRUBIN mg/dL  --   --   --   --   --   --   --   --   --   --  0.9   ALK PHOS U/L  --   --   --   --   --   --   --   --   --   --  58   ALT (SGPT) U/L  --   --   --   --   --   --   --   --   --   --  22   AST (SGOT) U/L  --   --   --   --   --   --   --   --   --   --  22   GLUCOSE mg/dL 144*  --   --   --  163*  --  109*  --  124*   < > 173*   WBC 10*3/mm3 10.30  --   --   --  8.94  --  9.21  --   --    < > 14.46*   HEMOGLOBIN g/dL 11.2*  11.2* 11.1* 11.5*   < > 11.3*   < > 7.4*   < > 7.8*   < > 8.1*   PLATELETS 10*3/mm3 248  --   --   --  265  --   249  --   --    < > 360   INR  2.20*  --   --   --   --   --  1.94*  --  2.09*   < >  --     < > = values in this interval not displayed.       Lab Results   Component Value Date    CALCIUM 8.3 (L) 01/13/2022    PHOS 3.2 01/03/2022                    Results Review:    I have reviewed the relevant laboratory results and independently reviewed the chest imaging from this hospitalization including the available echocardiogram reports personally and summarized it if/ when appropriate below    Assessment    Resuscitated PEA arrest; brief - 1/10  Acute hypoxic respiratory failure  Right spontaneous hemothorax s/p emergent Rt CT 1/10  S/p VATS 1/12  Acute blood loss anemia s/p PRBC  Hypovolemic hypotension  Abdominal wall hematoma; improved -spontaneous  Coumadin coagulopathy  Mechanical aortic valve s/p full AC; held now  Hyponatremia    PLAN:  Patient is stable from a respiratory standpoint on low supplemental oxygen.  HD stable and hb better post PRBC. Thoracic surgery plan noted.  Will defer anticoagulation plan to cardiology/ cardiothoracic surgery.  Hyponatremia is stable and will continue to monitor closely  Pain control with prn meds  Advance diet  Guarded prognosis  Full code    Ok to tx out from my end.     Han Seaman MD  1/13/2022

## 2022-01-13 NOTE — PROGRESS NOTES
Claudio Krishna   66 y.o.  male    LOS: 11 days   Patient Care Team:  Shayan Celeste MD as PCP - General (Family Medicine)      Subjective     Interval History:     Patient Complaints: No chest pain.  No significant shortness of air.  O2 sat good on room air    Review of Systems:   Subjective fever or chills  Medication Review:   Current Facility-Administered Medications:   •  acetaminophen (TYLENOL) tablet 1,000 mg, 1,000 mg, Oral, TID, Leyda Rodriguez APRN, 1,000 mg at 01/13/22 0812  •  acetaminophen (TYLENOL) tablet 650 mg, 650 mg, Oral, Q4H PRN, Leyda Rodriguez APRN, 650 mg at 01/10/22 1640  •  atorvastatin (LIPITOR) tablet 10 mg, 10 mg, Oral, Daily, Leyda Rodriguez APRN, 10 mg at 01/13/22 0812  •  benzonatate (TESSALON) capsule 100 mg, 100 mg, Oral, TID PRN, Leyda Rodriguez APRN, 100 mg at 01/07/22 0933  •  bisacodyl (DULCOLAX) suppository 10 mg, 10 mg, Rectal, Daily PRN, Lyeda Rodriguez APRN  •  celecoxib (CeleBREX) capsule 100 mg, 100 mg, Oral, Q12H, Leyda Rodriguez APRN, 100 mg at 01/13/22 0812  •  docusate sodium (COLACE) capsule 100 mg, 100 mg, Oral, BID, Leyda Rodriguez APRN, 100 mg at 01/13/22 0812  •  gabapentin (NEURONTIN) capsule 300 mg, 300 mg, Oral, Q8H, Leyda Rodriguez APRN, 300 mg at 01/13/22 0553  •  heparin (porcine) 5000 UNIT/ML injection 5,000 Units, 5,000 Units, Subcutaneous, Q8H, Leyda Rodriguez APRN, 5,000 Units at 01/13/22 0553  •  Hold medication, 1 each, Does not apply, Continuous PRN, Leyda Rodriguez APRN  •  HYDROcodone-acetaminophen (NORCO) 5-325 MG per tablet 1 tablet, 1 tablet, Oral, Q6H PRN, Leyda Rodriguez APRN  •  HYDROmorphone (DILAUDID) injection 0.5 mg, 0.5 mg, Intravenous, Q2H PRN, Leyda Rodriguez APRN, 0.5 mg at 01/12/22 1645  •  ipratropium-albuterol (DUO-NEB) nebulizer solution 3 mL, 3 mL, Nebulization, Q8H - RT, Leyda Rodriguez APRN, 3 mL at 01/13/22 1418  •  ketamine hcl syringe solution prefilled syringe 10 mg, 10 mg, Intravenous, Q15 Min PRN, Michael  DWAYNE Bautista  •  lactated ringers infusion, 9 mL/hr, Intravenous, Continuous, Leyda Rodriguez APRN, Last Rate: 9 mL/hr at 01/13/22 0553, 9 mL/hr at 01/13/22 0553  •  magnesium hydroxide (MILK OF MAGNESIA) suspension 10 mL, 10 mL, Oral, Daily PRN, Leyda Rodriguez APRN  •  melatonin tablet 3 mg, 3 mg, Oral, Nightly PRN, Leyda Rodriguez APRN, 3 mg at 01/10/22 0120  •  morphine injection 2 mg, 2 mg, Intravenous, Q4H PRN, Leyda Rodriguez APRN, 2 mg at 01/11/22 2317  •  nitroglycerin (NITROSTAT) SL tablet 0.4 mg, 0.4 mg, Sublingual, Q5 Min PRN, Leyda Rodriguez APRN  •  ondansetron (ZOFRAN) tablet 4 mg, 4 mg, Oral, Q6H PRN **OR** ondansetron (ZOFRAN) injection 4 mg, 4 mg, Intravenous, Q6H PRN, Leyda Rodriguez APRN  •  oxyCODONE (ROXICODONE) immediate release tablet 5 mg, 5 mg, Oral, Q4H PRN, Leyda Rodriguez APRN  •  polyethylene glycol (MIRALAX) packet 17 g, 17 g, Oral, Daily, Leyda Rodriguez APRN, 17 g at 01/13/22 0813  •  sennosides-docusate (PERICOLACE) 8.6-50 MG per tablet 2 tablet, 2 tablet, Oral, Nightly, Leyda Rodriguez APRN  •  [COMPLETED] Insert peripheral IV, , , Once **AND** sodium chloride 0.9 % flush 10 mL, 10 mL, Intravenous, PRN, Leyda Rodriguez APRN      Objective     Vital Sign Min/Max for last 24 hours  Temp  Min: 97.6 °F (36.4 °C)  Max: 99.1 °F (37.3 °C)   BP  Min: 121/68  Max: 153/81    Pulse  Min: 57  Max: 89     Wt Readings from Last 3 Encounters:   01/13/22 106 kg (233 lb 7.5 oz)        Intake/Output Summary (Last 24 hours) at 1/13/2022 1504  Last data filed at 1/13/2022 0800  Gross per 24 hour   Intake 2312.17 ml   Output 2677 ml   Net -364.83 ml     Physical Exam:      General Appearance:    Well developed and well nourished in no acute distress   Head:    Normocephalic, atraumatic   Eyes:            Conjunctivae normal, anicteric, no xanthelasma   Neck:   no carotid bruit, no JVD   Lungs:    2 chest tubes on the right, fairly clear on the left lung    Heart:    Regular rate and rhythm.   Normal S1, normal mechanical aortic valve sounds.  1/6 to 2/6 systolic murmur, no gallop, rub or lift.    Chest Wall:    No abnormalities observed   Abdomen:     Normal bowel sounds, no masses, no organomegaly, soft        nontender, nondistended, no guarding, no rebound                tenderness   Rectal:     Deferred   Extremities:   No clubbing, cyanosis or edema.         Skin:   No bleeding, bruising or rash   Neurologic:   awake alert and oriented x3, speech clear and approp, no facial drooping      Monitor:  nsr  Results Review:     I reviewed the patient's new clinical results.    Sodium Sodium   Date Value Ref Range Status   01/13/2022 134 (L) 136 - 145 mmol/L Final   01/12/2022 131 (L) 136 - 145 mmol/L Final   01/12/2022 132 (L) 136 - 145 mmol/L Final   01/11/2022 130 (L) 136 - 145 mmol/L Final      Potassium Potassium   Date Value Ref Range Status   01/13/2022 4.6 3.5 - 5.2 mmol/L Final   01/12/2022 4.6 3.5 - 5.2 mmol/L Final   01/12/2022 4.3 3.5 - 5.2 mmol/L Final   01/11/2022 4.3 3.5 - 5.2 mmol/L Final      Chloride Chloride   Date Value Ref Range Status   01/13/2022 100 98 - 107 mmol/L Final   01/12/2022 100 98 - 107 mmol/L Final   01/12/2022 97 (L) 98 - 107 mmol/L Final   01/11/2022 97 (L) 98 - 107 mmol/L Final      Bicarbonate No results found for: PLASMABICARB   BUN BUN   Date Value Ref Range Status   01/13/2022 11 8 - 23 mg/dL Final   01/12/2022 11 8 - 23 mg/dL Final   01/12/2022 11 8 - 23 mg/dL Final   01/11/2022 15 8 - 23 mg/dL Final      Creatinine Creatinine   Date Value Ref Range Status   01/13/2022 0.53 (L) 0.76 - 1.27 mg/dL Final   01/12/2022 0.56 (L) 0.76 - 1.27 mg/dL Final   01/12/2022 0.55 (L) 0.76 - 1.27 mg/dL Final   01/11/2022 0.64 (L) 0.76 - 1.27 mg/dL Final      Calcium Calcium   Date Value Ref Range Status   01/13/2022 8.3 (L) 8.6 - 10.5 mg/dL Final   01/12/2022 7.9 (L) 8.6 - 10.5 mg/dL Final   01/12/2022 8.2 (L) 8.6 - 10.5 mg/dL Final   01/11/2022 8.5 (L) 8.6 - 10.5 mg/dL Final       Magnesium No results found for: MG     Results from last 7 days   Lab Units 01/13/22  0405   WBC 10*3/mm3 10.30   HEMOGLOBIN g/dL 11.2*  11.2*   HEMATOCRIT % 34.2*  34.2*   PLATELETS 10*3/mm3 248     Lab Results   Lab Value Date/Time    TROPONINT <0.010 01/10/2022 0257     No results found for: CHOL  No results found for: HDL  No results found for: LDL  No results found for: TRIG  No components found for: CHOLHDL    Results from last 7 days   Lab Units 01/13/22  0405 01/12/22  0349 01/11/22  0410   INR  2.20* 1.94* 2.09*         Echo EF Estimated  No results found for: ECHOEFEST       Assessment/ Plan  1. Hemothorax; code on 1/9/2022.  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 right-sided pleural effusion        -s/p R thoracentesis 1/3/21:500cc of bloody fluid removed  4. HTN  5.  Sudden decompensation on 1/9/2022 with severe bradycardia and required some CPR for resuscitation.  Appears to be related to increase in the hemothorax   6.Bronchoscopy, right video-assisted thoracoscopy with complete decortication and evacuation of hemothorax on 1/12/2022  Plan #1 blood pressure good.  Sinus rhythm rate of about 99 at present.  Mechanical  aortic valve sounds are good on exam.  Notes reviewed on recent events  2.  Off any anticoagulants until cleared by thoracic surgery   3.  Discussed  with the patient and his wife.  Colin Yun MD  01/13/22  15:04 EST      Time: 18 minutes

## 2022-01-13 NOTE — PLAN OF CARE
Goal Outcome Evaluation:    Pt remain overflow  in CCU. Pt in roomair sating 95-96%. A & O X4.  2 chest tubes hooked to the wall suction with minimal output. Pt walked to the hallway X2 today without any complication. Uses urinal. Regular diet. Vital stable. Will keep monitoring.

## 2022-01-13 NOTE — PROGRESS NOTES
"  POST-OPERATIVE NOTE     Chief Complaint: Hemothorax  S/P: Bronchoscopy, right video-assisted thoracoscopy with complete decortication and evacuation of hemothorax  POD # 1    Subjective:  Symptoms:  Improved.  No shortness of breath or chest pain.    Diet:  No nausea or vomiting.    Activity level: Returning to normal.    Pain:  He complains of pain that is mild.        Objective:  General Appearance:  Comfortable and in no acute distress.    Vital signs: (most recent): Blood pressure 153/81, pulse 63, temperature 97.9 °F (36.6 °C), temperature source Oral, resp. rate 20, height 177.8 cm (70\"), weight 106 kg (233 lb 7.5 oz), SpO2 92 %.  Vital signs are normal.  No fever.    Output: Producing urine.    Lungs:  Normal effort and normal respiratory rate.  There are decreased breath sounds.  No wheezes or rhonchi.    Heart: Normal rate.  Regular rhythm.    Abdomen: Abdomen is soft.  There is no abdominal tenderness.     Pulses: Distal pulses are intact.    Neurological: Patient is alert and oriented to person, place and time.    Skin:  Warm and dry.              Chest tube:   Site: Right, Clean, Dry, Intact and Securement device intact  Suction: -20 cm  Air Leak: negative  24 Hour Total: 57/310cc    Results Review:     I reviewed the patient's new clinical results.  I reviewed the patient's new imaging results and agree with the interpretation.  I reviewed the patient's other test results and agree with the interpretation  Discussed with patient, RN and Dr. Crowe.    Assessment/Plan     This morning's x-ray was independently reviewed and demonstrates persistent right basilar, consolidation and pleural effusion but no definite pneumothorax with expected postsurgical changes.  Continue chest tubes to -20 cm suction today.  We will recheck x-ray in a.m.  Okay to initiate heparin drip from our standpoint.  No oral anticoagulation while chest tubes are in place.  Check a.m. labs.  Agree with recheck of H&H tonight but, DC " of every 6 H&H.  Advance patient to regular diet as tolerated.  Encourage ambulation, good pulmonary hygiene and incentive spirometry 10x per hour while awake.    DWAYNE Martin  [facility]  01/13/22  10:18 EST    Patient was seen and assessed while wearing personal protective equipment including facemask, protective eyewear and gloves.  Hand hygiene performed prior to entering the room and upon exiting with doffing of gloves.

## 2022-01-13 NOTE — PLAN OF CARE
Goal Outcome Evaluation:  Plan of Care Reviewed With: patient        Progress: improving       Pt in CCU S/P VAT. Pt alert and oriented. VSS with pt on 3L NC. Drainage from chest tube charted. Good urine output no BM. Dressing changed. Pt updated on plan of care. Continue to monitor.

## 2022-01-14 ENCOUNTER — APPOINTMENT (OUTPATIENT)
Dept: GENERAL RADIOLOGY | Facility: HOSPITAL | Age: 67
End: 2022-01-14

## 2022-01-14 LAB
ANION GAP SERPL CALCULATED.3IONS-SCNC: 7.2 MMOL/L (ref 5–15)
APTT PPP: 36.2 SECONDS (ref 22.7–35.4)
APTT PPP: 45.1 SECONDS (ref 22.7–35.4)
BASOPHILS # BLD AUTO: 0.03 10*3/MM3 (ref 0–0.2)
BASOPHILS NFR BLD AUTO: 0.4 % (ref 0–1.5)
BH BB BLOOD EXPIRATION DATE: NORMAL
BH BB BLOOD EXPIRATION DATE: NORMAL
BH BB BLOOD TYPE BARCODE: 5100
BH BB BLOOD TYPE BARCODE: 5100
BH BB DISPENSE STATUS: NORMAL
BH BB DISPENSE STATUS: NORMAL
BH BB PRODUCT CODE: NORMAL
BH BB PRODUCT CODE: NORMAL
BH BB UNIT NUMBER: NORMAL
BH BB UNIT NUMBER: NORMAL
BUN SERPL-MCNC: 14 MG/DL (ref 8–23)
BUN/CREAT SERPL: 20.9 (ref 7–25)
CALCIUM SPEC-SCNC: 8 MG/DL (ref 8.6–10.5)
CHLORIDE SERPL-SCNC: 100 MMOL/L (ref 98–107)
CO2 SERPL-SCNC: 26.8 MMOL/L (ref 22–29)
CREAT SERPL-MCNC: 0.67 MG/DL (ref 0.76–1.27)
CROSSMATCH INTERPRETATION: NORMAL
CROSSMATCH INTERPRETATION: NORMAL
DEPRECATED RDW RBC AUTO: 44.7 FL (ref 37–54)
DEPRECATED RDW RBC AUTO: 46.9 FL (ref 37–54)
EOSINOPHIL # BLD AUTO: 0.35 10*3/MM3 (ref 0–0.4)
EOSINOPHIL NFR BLD AUTO: 5.2 % (ref 0.3–6.2)
ERYTHROCYTE [DISTWIDTH] IN BLOOD BY AUTOMATED COUNT: 13.5 % (ref 12.3–15.4)
ERYTHROCYTE [DISTWIDTH] IN BLOOD BY AUTOMATED COUNT: 13.6 % (ref 12.3–15.4)
GFR SERPL CREATININE-BSD FRML MDRD: 119 ML/MIN/1.73
GLUCOSE BLDC GLUCOMTR-MCNC: 111 MG/DL (ref 70–130)
GLUCOSE BLDC GLUCOMTR-MCNC: 114 MG/DL (ref 70–130)
GLUCOSE BLDC GLUCOMTR-MCNC: 138 MG/DL (ref 70–130)
GLUCOSE SERPL-MCNC: 110 MG/DL (ref 65–99)
HCT VFR BLD AUTO: 32.9 % (ref 37.5–51)
HCT VFR BLD AUTO: 34.4 % (ref 37.5–51)
HCT VFR BLD AUTO: 35.5 % (ref 37.5–51)
HGB BLD-MCNC: 10.8 G/DL (ref 13–17.7)
HGB BLD-MCNC: 11.1 G/DL (ref 13–17.7)
HGB BLD-MCNC: 11.4 G/DL (ref 13–17.7)
IMM GRANULOCYTES # BLD AUTO: 0.02 10*3/MM3 (ref 0–0.05)
IMM GRANULOCYTES NFR BLD AUTO: 0.3 % (ref 0–0.5)
INR PPP: 1.54 (ref 0.9–1.1)
INR PPP: 1.76 (ref 0.9–1.1)
LYMPHOCYTES # BLD AUTO: 0.73 10*3/MM3 (ref 0.7–3.1)
LYMPHOCYTES NFR BLD AUTO: 10.9 % (ref 19.6–45.3)
MCH RBC QN AUTO: 30.5 PG (ref 26.6–33)
MCH RBC QN AUTO: 30.7 PG (ref 26.6–33)
MCHC RBC AUTO-ENTMCNC: 32.1 G/DL (ref 31.5–35.7)
MCHC RBC AUTO-ENTMCNC: 32.8 G/DL (ref 31.5–35.7)
MCV RBC AUTO: 93.5 FL (ref 79–97)
MCV RBC AUTO: 94.9 FL (ref 79–97)
MONOCYTES # BLD AUTO: 0.46 10*3/MM3 (ref 0.1–0.9)
MONOCYTES NFR BLD AUTO: 6.9 % (ref 5–12)
NEUTROPHILS NFR BLD AUTO: 5.1 10*3/MM3 (ref 1.7–7)
NEUTROPHILS NFR BLD AUTO: 76.3 % (ref 42.7–76)
NRBC BLD AUTO-RTO: 0 /100 WBC (ref 0–0.2)
PLATELET # BLD AUTO: 252 10*3/MM3 (ref 140–450)
PLATELET # BLD AUTO: 257 10*3/MM3 (ref 140–450)
PMV BLD AUTO: 8.9 FL (ref 6–12)
PMV BLD AUTO: 9.2 FL (ref 6–12)
POTASSIUM SERPL-SCNC: 4 MMOL/L (ref 3.5–5.2)
PROTHROMBIN TIME: 18.3 SECONDS (ref 11.7–14.2)
PROTHROMBIN TIME: 20.3 SECONDS (ref 11.7–14.2)
RBC # BLD AUTO: 3.52 10*6/MM3 (ref 4.14–5.8)
RBC # BLD AUTO: 3.74 10*6/MM3 (ref 4.14–5.8)
SODIUM SERPL-SCNC: 134 MMOL/L (ref 136–145)
UNIT  ABO: NORMAL
UNIT  ABO: NORMAL
UNIT  RH: NORMAL
UNIT  RH: NORMAL
WBC NRBC COR # BLD: 6.69 10*3/MM3 (ref 3.4–10.8)
WBC NRBC COR # BLD: 7.77 10*3/MM3 (ref 3.4–10.8)

## 2022-01-14 PROCEDURE — 25010000002 HEPARIN (PORCINE) 25000-0.45 UT/250ML-% SOLUTION: Performed by: INTERNAL MEDICINE

## 2022-01-14 PROCEDURE — 85025 COMPLETE CBC W/AUTO DIFF WBC: CPT | Performed by: INTERNAL MEDICINE

## 2022-01-14 PROCEDURE — 80048 BASIC METABOLIC PNL TOTAL CA: CPT | Performed by: NURSE PRACTITIONER

## 2022-01-14 PROCEDURE — 85610 PROTHROMBIN TIME: CPT | Performed by: INTERNAL MEDICINE

## 2022-01-14 PROCEDURE — 85730 THROMBOPLASTIN TIME PARTIAL: CPT | Performed by: INTERNAL MEDICINE

## 2022-01-14 PROCEDURE — 99024 POSTOP FOLLOW-UP VISIT: CPT | Performed by: NURSE PRACTITIONER

## 2022-01-14 PROCEDURE — 97110 THERAPEUTIC EXERCISES: CPT

## 2022-01-14 PROCEDURE — 85014 HEMATOCRIT: CPT | Performed by: NURSE PRACTITIONER

## 2022-01-14 PROCEDURE — 85027 COMPLETE CBC AUTOMATED: CPT | Performed by: NURSE PRACTITIONER

## 2022-01-14 PROCEDURE — 25010000002 HEPARIN (PORCINE) PER 1000 UNITS: Performed by: NURSE PRACTITIONER

## 2022-01-14 PROCEDURE — 85730 THROMBOPLASTIN TIME PARTIAL: CPT | Performed by: NURSE PRACTITIONER

## 2022-01-14 PROCEDURE — 82962 GLUCOSE BLOOD TEST: CPT

## 2022-01-14 PROCEDURE — 71045 X-RAY EXAM CHEST 1 VIEW: CPT

## 2022-01-14 PROCEDURE — 85610 PROTHROMBIN TIME: CPT | Performed by: NURSE PRACTITIONER

## 2022-01-14 PROCEDURE — 85018 HEMOGLOBIN: CPT | Performed by: NURSE PRACTITIONER

## 2022-01-14 RX ORDER — METOPROLOL SUCCINATE 25 MG/1
25 TABLET, EXTENDED RELEASE ORAL
Status: DISCONTINUED | OUTPATIENT
Start: 2022-01-14 | End: 2022-01-19 | Stop reason: HOSPADM

## 2022-01-14 RX ORDER — LISINOPRIL 10 MG/1
10 TABLET ORAL
Status: DISCONTINUED | OUTPATIENT
Start: 2022-01-14 | End: 2022-01-19 | Stop reason: HOSPADM

## 2022-01-14 RX ORDER — HEPARIN SODIUM 10000 [USP'U]/100ML
12 INJECTION, SOLUTION INTRAVENOUS
Status: DISCONTINUED | OUTPATIENT
Start: 2022-01-14 | End: 2022-01-19 | Stop reason: HOSPADM

## 2022-01-14 RX ADMIN — ACETAMINOPHEN 1000 MG: 500 TABLET, FILM COATED ORAL at 17:00

## 2022-01-14 RX ADMIN — GABAPENTIN 300 MG: 300 CAPSULE ORAL at 06:27

## 2022-01-14 RX ADMIN — LISINOPRIL 10 MG: 10 TABLET ORAL at 17:49

## 2022-01-14 RX ADMIN — METOPROLOL SUCCINATE 25 MG: 25 TABLET, EXTENDED RELEASE ORAL at 17:49

## 2022-01-14 RX ADMIN — CELECOXIB 100 MG: 100 CAPSULE ORAL at 08:08

## 2022-01-14 RX ADMIN — POLYETHYLENE GLYCOL 3350 17 G: 17 POWDER, FOR SOLUTION ORAL at 08:09

## 2022-01-14 RX ADMIN — CELECOXIB 100 MG: 100 CAPSULE ORAL at 20:45

## 2022-01-14 RX ADMIN — DOCUSATE SODIUM 100 MG: 100 CAPSULE, LIQUID FILLED ORAL at 08:09

## 2022-01-14 RX ADMIN — HEPARIN SODIUM 5000 UNITS: 5000 INJECTION INTRAVENOUS; SUBCUTANEOUS at 06:28

## 2022-01-14 RX ADMIN — ATORVASTATIN CALCIUM 10 MG: 20 TABLET, FILM COATED ORAL at 08:09

## 2022-01-14 RX ADMIN — DOCUSATE SODIUM 100 MG: 100 CAPSULE, LIQUID FILLED ORAL at 20:45

## 2022-01-14 RX ADMIN — DOCUSATE SODIUM 50MG AND SENNOSIDES 8.6MG 2 TABLET: 8.6; 5 TABLET, FILM COATED ORAL at 20:45

## 2022-01-14 RX ADMIN — HEPARIN SODIUM 12 UNITS/KG/HR: 10000 INJECTION, SOLUTION INTRAVENOUS at 12:53

## 2022-01-14 RX ADMIN — ACETAMINOPHEN 1000 MG: 500 TABLET, FILM COATED ORAL at 08:08

## 2022-01-14 RX ADMIN — ACETAMINOPHEN 1000 MG: 500 TABLET, FILM COATED ORAL at 20:45

## 2022-01-14 NOTE — THERAPY TREATMENT NOTE
Patient Name: Claudio Krishna  : 1955    MRN: [id number]                              Today's Date: 2022       Admit Date: 2022    Visit Dx:     ICD-10-CM ICD-9-CM   1. Abdominal wall hematoma, initial encounter  S30.1XXA 922.2   2. Hemothorax on right  J94.2 511.89   3. Chronic anticoagulation  Z79.01 V58.61   4. Mechanical heart valve present  Z95.2 V43.3   5. Hemothorax  J94.2 511.89     Patient Active Problem List   Diagnosis   • Abdominal wall hematoma   • Coronary artery disease   • Mechanical heart valve present   • Hemothorax   • Essential hypertension     Past Medical History:   Diagnosis Date   • Coronary artery disease    • Hypertension      Past Surgical History:   Procedure Laterality Date   • AORTA SURGERY  2011   • HERNIA REPAIR     • KNEE MINI REVISION     • THORACOSCOPY WITH DECORTICATION Right 2022    Procedure: BRONCHOSCOPY, RIGHT THORACOSCOPY VIDEO ASSISTED WITH COMPLETE DECORTICATION;  Surgeon: Sol Andrews MD;  Location: [facility];  Service: Thoracic;  Laterality: Right;      General Information     Row Name 22          Physical Therapy Time and Intention    Document Type therapy note (daily note)  -AR     Mode of Treatment physical therapy  -AR     Row Name 22 165          General Information    Patient Profile Reviewed yes  -AR     Existing Precautions/Restrictions fall  -AR     Row Name 22          Cognition    Orientation Status (Cognition) oriented x 4  -AR     Row Name 22          Safety Issues, Functional Mobility    Impairments Affecting Function (Mobility) endurance/activity tolerance; balance  -AR           User Key  (r) = Recorded By, (t) = Taken By, (c) = Cosigned By    Initials Name Provider Type    AR Stephanie Thompson PT Physical Therapist               Mobility     Row Name 22 165          Bed Mobility    Bed Mobility supine-sit; sit-supine  -AR     Supine-Sit [place] (Bed Mobility) standby  assist  -AR     Sit-Supine [place] (Bed Mobility) standby assist  -AR     Assistive Device (Bed Mobility) head of bed elevated; bed rails  -AR     Row Name 01/14/22 1651          Sit-Stand Transfer    Sit-Stand [place] (Transfers) contact guard  -AR     Assistive Device (Sit-Stand Transfers) walker, front-wheeled  -AR     Row Name 01/14/22 1651          Gait/Stairs (Locomotion)    [place] Level (Gait) contact guard  -AR     Assistive Device (Gait) walker, front-wheeled  -AR     Distance in Feet (Gait) 150, BP monitored before and after ambulation.  Held further distance due to increase in BP, RN aware.  -AR     Deviations/Abnormal Patterns (Gait) maida decreased; gait speed decreased; stride length decreased  -AR           User Key  (r) = Recorded By, (t) = Taken By, (c) = Cosigned By    Initials Name Provider Type    AR Stephanie Thompson, PT Physical Therapist               Obj/Interventions     Row Name 01/14/22 1651          Motor Skills    Therapeutic Exercise --  B AP, LAQ 10x  -AR     [facility] Name 01/14/22 1651          Balance    Balance Assessment sitting static balance; standing dynamic balance; standing static balance  -AR     Static Sitting Balance WFL  -AR     Static Standing Balance mild impairment; unsupported  -AR     Dynamic Standing Balance mild impairment; supported  -AR           User Key  (r) = Recorded By, (t) = Taken By, (c) = Cosigned By    Initials Name Provider Type    AR Stephanie Thompson, PT Physical Therapist               Goals/Plan    No documentation.                Clinical Impression     Row Name 01/14/22 1652          Pain    Additional Documentation Pain Scale: Numbers Pre/Post-Treatment (Group)  -AR     [facility] Name 01/14/22 1652          Pain Scale: Numbers Pre/Post-Treatment    Pretreatment Pain Rating 0/10 - no pain  -AR     Posttreatment Pain Rating 3/10  -AR     Pain Location - Orientation incisional  -AR     Pain Location chest  -AR     Pain Intervention(s) Medication  (See MAR); Repositioned  -AR     Row Name 01/14/22 1652          Plan of Care Review    Outcome Summary Improved independence w/ transfers and bed mobility.  Ambulated 150' w/ contact assist using RW, slow guarded gait pattern.  Held further activity due to increased BP, discussed with RN.  Reviewed activity/walking recommendations and PT POC.  -AR     Row Name 01/14/22 1652          Therapy Assessment/Plan (PT)    Rehab Potential (PT) good, to achieve stated therapy goals  -AR     Criteria for Skilled Interventions Met (PT) yes  -AR     Row Name 01/14/22 1652          Vital Signs    Pre Systolic BP Rehab 164  RN request to continue w/ PT  -AR     Intra Systolic BP Rehab 162  -AR     Post Systolic BP Rehab 180  RN aware  -AR     O2 Delivery Pre Treatment room air  -AR     Row Name 01/14/22 1652          Positioning and Restraints    Pre-Treatment Position in bed  -AR     Post Treatment Position bed  -AR     In Bed notified nsg; supine; call light within reach; encouraged to call for assist; with family/caregiver  -AR           User Key  (r) = Recorded By, (t) = Taken By, (c) = Cosigned By    Initials Name Provider Type    AR Stephanie Thompson, PT Physical Therapist               Outcome Measures     Row Name 01/14/22 1654          How much help from another person do you currently need...    Turning from your back to your side while in flat bed without using bedrails? 4  -AR     Moving from lying on back to sitting on the side of a flat bed without bedrails? 3  -AR     Moving to and from a bed to a chair (including a wheelchair)? 3  -AR     Standing up from a chair using your arms (e.g., wheelchair, bedside chair)? 3  -AR     Climbing 3-5 steps with a railing? 2  -AR     To walk in hospital room? 3  -AR     AM-PAC 6 Clicks Score (PT) 18  -AR     Row Name 01/14/22 1654          Functional Assessment    Outcome Measure Options AM-PAC 6 Clicks Basic Mobility (PT)  -AR           User Key  (r) = Recorded By, (t) = Taken  By, (c) = Cosigned By    Initials Name Provider Type    AR Stephanie Thompson, PT Physical Therapist                             Physical Therapy Education                 Title: PT OT SLP Therapies (In Progress)     Topic: Physical Therapy (In Progress)     Point: Mobility training (In Progress)     Learning Progress Summary           Patient Acceptance, E, NR by AR at 1/14/2022 1654    Acceptance, E,TB,D, VU,NR by  at 1/13/2022 1650                   Point: Home exercise program (In Progress)     Learning Progress Summary           Patient Acceptance, E, NR by AR at 1/14/2022 1654                   Point: Body mechanics (In Progress)     Learning Progress Summary           Patient Acceptance, E, NR by AR at 1/14/2022 1654    Acceptance, E,TB,D, VU,NR by  at 1/13/2022 1650                   Point: Precautions (In Progress)     Learning Progress Summary           Patient Acceptance, E, NR by AR at 1/14/2022 1654    Acceptance, E,TB,D, VU,NR by  at 1/13/2022 1650                               User Key     Initials Effective Dates Name Provider Type Discipline     06/16/21 -  Sherley Bob, PT Physical Therapist PT    AR 06/16/21 -  Stephanie Thompson, PT Physical Therapist PT              PT Recommendation and Plan     Outcome Summary: Improved independence w/ transfers and bed mobility.  Ambulated 150' w/ contact assist using RW, slow guarded gait pattern.  Held further activity due to increased BP, discussed with RN.  Reviewed activity/walking recommendations and PT POC.     Time Calculation:    PT Charges     Row Name 01/14/22 1650             Time Calculation    Start Time 1451  -AR      Stop Time 1520  -AR      Time Calculation (min) 29 min  -AR      PT Received On 01/14/22  -AR      PT - Next Appointment 01/16/22  -AR            User Key  (r) = Recorded By, (t) = Taken By, (c) = Cosigned By    Initials Name Provider Type    AR Stephanie Thompson, PT Physical Therapist              Therapy Charges for  Today     Code Description Service Date Service Provider Modifiers Qty    87451353131 HC PT THER PROC EA 15 MIN 1/14/2022 Stephanie Thompson, PT GP 2          PT G-Codes  Outcome Measure Options: AM-PAC 6 Clicks Basic Mobility (PT)  AM-PAC 6 Clicks Score (PT): 18    Stephanie Thompson, KITTY  1/14/2022

## 2022-01-14 NOTE — NURSING NOTE
Patient remains in CCU. VSS at this time. Chest tubes maintained; minimal output noted. Large right sided flank bruising noted (present previous shifts); does not appear to be increasing in size this shift; R side tender on palpation and pain with cough. Pt ambulated around unit X1. Pt was able to sleep throughout shift. Plan of care discussed with patient. Continue to monitor.

## 2022-01-14 NOTE — PROGRESS NOTES
Han Seaman MD                          [phone]      Patient ID:    Name:  Claudio Krishna    MRN:  [id number]    1955   66 y.o.  male            Patient Care Team:  Shayan Celeste MD as PCP - General (Family Medicine)    CC/ Reason for visit: Acute respiratory failure, spontaneous right abdominal wall hematoma thought to be from coughing, worsening right hemothorax, cardiac arrest    Subjective: Pt seen and examined this AM.  Patient on low-dose supplemental oxygen. Did well overnight. H/h stable. Not on AC yet    ROS: Complains of some chest soreness but otherwise no nausea vomiting or diarrhea.  Denies any palpitations     Objective     Vital Signs past 24hrs    BP range: BP: (123-157)/(59-78) 137/69  Pulse range: Heart Rate:  [63-98] 64  Resp rate range: Resp:  [20] 20  Temp range: Temp (24hrs), Av.7 °F (37.1 °C), Min:98.5 °F (36.9 °C), Max:98.8 °F (37.1 °C)      Ventilator/Non-Invasive Ventilation Settings (From admission, onward)            None          Device (Oxygen Therapy): room air       105 kg (231 lb 14.8 oz); Body mass index is 33.28 kg/m².      Intake/Output Summary (Last 24 hours) at 2022 0802  Last data filed at 2022 0338  Gross per 24 hour   Intake 60 ml   Output 1309 ml   Net -1249 ml       PHYSICAL EXAM   Constitutional: Middle aged pt in bed, No acute respiratory distress, + accessory muscle use  Head: - NCAT  Eyes: No pallor.  Anicteric sclerae, EOMI.  ENMT:  Mallampati 3, no oral thrush. Moist MM.   NECK: Trachea midline, No thyromegaly, no palpable cervical lymphadenopathy  Heart: RRR, no murmur. No pedal edema   Lungs: MAYE +, right-sided chest tubes +. No wheezes/ crackles heard    Abdomen: Soft. No tenderness, guarding or rigidity. No palpable masses  Extremities: Extremities warm and well perfused. No cyanosis/ clubbing  Neuro: Conscious, answers appropriately, no gross focal neuro deficits  Psych: Mood and affect  appropriate    PPE recommended per [facility] infectious disease Isolation protocol for the current clinical scenario(as mentioned below) was followed.     Scheduled meds:  acetaminophen, 1,000 mg, Oral, TID  atorvastatin, 10 mg, Oral, Daily  celecoxib, 100 mg, Oral, Q12H  docusate sodium, 100 mg, Oral, BID  gabapentin, 300 mg, Oral, Q8H  heparin (porcine), 5,000 Units, Subcutaneous, Q8H  ipratropium-albuterol, 3 mL, Nebulization, Q8H - RT  polyethylene glycol, 17 g, Oral, Daily  senna-docusate sodium, 2 tablet, Oral, Nightly        IV meds:                      hold, 1 each  lactated ringers, 9 mL/hr, Last Rate: 9 mL/hr (01/13/22 0553)        Data Review:      Results from last 7 days   Lab Units 01/14/22  0337 01/13/22  1755 01/13/22  0405 01/12/22  1517 01/12/22  1101 01/12/22  0942 01/12/22  0349 01/10/22  0537 01/10/22  0257   SODIUM mmol/L 134*  --  134*  --  131*  --  132*   < > 129*   POTASSIUM mmol/L 4.0  --  4.6  --  4.6  --  4.3   < > 4.7   CHLORIDE mmol/L 100  --  100  --  100  --  97*   < > 96*   CO2 mmol/L 26.8  --  26.1  --  24.8  --  25.4   < > 24.4   BUN mg/dL 14  --  11  --  11  --  11   < > 14   CREATININE mg/dL 0.67*  --  0.53*  --  0.56*  --  0.55*   < > 0.87   CALCIUM mg/dL 8.0*  --  8.3*  --  7.9*  --  8.2*   < > 8.1*   BILIRUBIN mg/dL  --   --   --   --   --   --   --   --  0.9   ALK PHOS U/L  --   --   --   --   --   --   --   --  58   ALT (SGPT) U/L  --   --   --   --   --   --   --   --  22   AST (SGOT) U/L  --   --   --   --   --   --   --   --  22   GLUCOSE mg/dL 110*  --  144*  --  163*  --  109*   < > 173*   WBC 10*3/mm3 7.77  --  10.30  --  8.94  --  9.21   < > 14.46*   HEMOGLOBIN g/dL 10.8* 11.5* 11.2*  11.2*   < > 11.3*  --  7.4*   < > 8.1*   HEMOGLOBIN, POC   --   --   --   --   --    < >  --   --   --    PLATELETS 10*3/mm3 257  --  248  --  265  --  249   < > 360   INR  1.76*  --  2.20*  --   --   --  1.94*   < >  --     < > = values in this interval not displayed.        Lab Results   Component Value Date    CALCIUM 8.0 (L) 01/14/2022    PHOS 3.2 01/03/2022             Results from last 7 days   Lab Units 01/12/22  0942   PH, ARTERIAL pH units 7.41        Results Review:    I have reviewed the relevant laboratory results and independently reviewed the chest imaging from this hospitalization including the available echocardiogram reports personally and summarized it if/ when appropriate below    Assessment    Resuscitated PEA arrest; brief - 1/10  Acute hypoxic respiratory failure  Right spontaneous hemothorax s/p emergent Rt CT 1/10  S/p VATS 1/12  Acute blood loss anemia s/p PRBC  Hypovolemic hypotension  Abdominal wall hematoma; improved -spontaneous  Coumadin coagulopathy  Mechanical aortic valve s/p full AC; held now  Hyponatremia    PLAN:  Patient on low supplemental oxygen. C/w wean CXR reviewed  H/h stable with no active bleeding concerns.  Thoracic surgery plan noted. CT management per them  Will defer anticoagulation plan to cardiology/ cardiothoracic surgery.  Hyponatremia is stable  Pain control with prn meds  PT eval and OOB     Still awaiting floor bed !    Han Seaman MD  1/14/2022

## 2022-01-14 NOTE — PROGRESS NOTES
"  POST-OPERATIVE NOTE     Chief Complaint: Hemothorax  S/P: Bronchoscopy, right video-assisted thoracoscopy with complete decortication and evacuation of hemothorax  POD # 2    Subjective:  Symptoms:  Improved.  No shortness of breath or chest pain.    Diet:  No nausea or vomiting.    Activity level: Returning to normal.    Pain:  He complains of pain that is mild.        Objective:  General Appearance:  Comfortable and in no acute distress.    Vital signs: (most recent): Blood pressure 137/69, pulse 64, temperature 98.5 °F (36.9 °C), temperature source Oral, resp. rate 20, height 177.8 cm (70\"), weight 105 kg (231 lb 14.8 oz), SpO2 94 %.  Vital signs are normal.  No fever.    Output: Producing urine.    Lungs:  Normal effort and normal respiratory rate.  There are decreased breath sounds.  No wheezes or rhonchi.    Heart: Normal rate.  Regular rhythm.    Abdomen: Abdomen is soft.  There is no abdominal tenderness.     Pulses: Distal pulses are intact.    Neurological: Patient is alert and oriented to person, place and time.    Skin:  Warm and dry.              Chest tube:   Site: Right, Clean, Dry, Intact and Securement device intact  Suction: -20 cm  Air Leak: negative  24 Hour Total: 19/90cc    Results Review:     I reviewed the patient's new clinical results.  I reviewed the patient's new imaging results and agree with the interpretation.  I reviewed the patient's other test results and agree with the interpretation  Discussed with patient, RN and Dr. Crowe.    Assessment/Plan     This morning's x-ray was independently reviewed and demonstrates persistent right basilar, consolidation and pleural effusion but no definite pneumothorax with expected postsurgical changes. There appears to be some improvement in lung aeration.  Place chest tubes to waterseal today. We will recheck x-ray in a.m.  Okay to initiate heparin drip from our standpoint.  No oral anticoagulation while chest tubes are in place.  Hemoglobin " stable. Check a.m. labs.   Encourage ambulation, good pulmonary hygiene and incentive spirometry 10x per hour while awake.  Transfer to [facility].    DWAYNE Martin  [facility]  01/14/22  09:53 EST    Patient was seen and assessed while wearing personal protective equipment including facemask, protective eyewear and gloves.  Hand hygiene performed prior to entering the room and upon exiting with doffing of gloves.

## 2022-01-14 NOTE — CASE MANAGEMENT/SOCIAL WORK
Continued Stay Note  [facility]     Patient Name: Claudio Krishna  MRN: [id number]  Today's Date: 1/14/2022    Admit Date: 1/1/2022     Discharge Plan     Row Name 01/14/22 1359       Plan    Plan Home if able    Plan Comments Pt plans home.  He is on oxygen.  CCP following for DC needs               Discharge Codes    No documentation.               Expected Discharge Date and Time     Expected Discharge Date Expected Discharge Time    Alexis 15, 2022             Priya Underwood RN

## 2022-01-14 NOTE — PLAN OF CARE
Goal Outcome Evaluation:  Plan of Care Reviewed With: patient        Progress: improving  Outcome Summary: Patient transferred from CCU to 547; alert and oriented x 4; VSS. 2 right side chest tubes to waterseal; no air leak noted. Dressing changed due to drainage. No c/o pain or SOA. Heparin drip running. BP medication ordered for HTN; will give when arrives from pharmacy. Patient has ambulated total of 3 laps today. Will continue supportive care.

## 2022-01-14 NOTE — PROGRESS NOTES
"Adult Nutrition  Assessment/PES    Patient Name:  Claudio Krishna  YOB: 1955  MRN: [id number]  Admit Date:  1/1/2022    Assessment Date:  1/14/2022    Comments:  Nutrition screen completed for LOS.Medical course reviewed and includes hemothorax s/p chest tubes and VAT, cardiac arrest, MVR s/p AC. BMI 33. Labs:Na 134,H/H calcium 8.0, glu 110   Diet Regular as tolerated with adequate intake. Discussed care in rounds. Will continue to monitor per protocol.      Reason for Assessment     Row Name 01/14/22 0713          Reason for Assessment    Reason For Assessment other (see comments)  LOS     Diagnosis --  Right Hemothorax, s/p emergent Rt CT 1/10  S/p VATS 1/12, cardiac arrest , MAV s/p AC, right side pleural effusion, HTN,                Nutrition/Diet History     Row Name 01/14/22 0713          Nutrition/Diet History    Typical Food/Fluid Intake Diet: Regular- fair intake                Anthropometrics     Row Name 01/14/22 0714 01/14/22 0347       Anthropometrics    Height 177.8 cm (70\") --    Weight 105 kg (231 lb 14.8 oz)  not weighed by  kg (231 lb 14.8 oz)       Ideal Body Weight (IBW)    Ideal Body Weight (IBW) (kg) 76.48 --    % Ideal Body Weight 137.55 --       Body Mass Index (BMI)    BMI (kg/m2) 33.35 --    BMI Assessment BMI 30-34.9: obesity grade I --               Labs/Tests/Procedures/Meds     Row Name 01/14/22 0714          Labs/Procedures/Meds    Lab Results Reviewed reviewed     Lab Results Comments na 134,H/H calcium, glu            Diagnostic Tests/Procedures    Diagnostic Test/Procedure Reviewed reviewed            Medications    Pertinent Medications Reviewed reviewed     Pertinent Medications Comments lipitor, colace, heparin, miralax, pericolace                Physical Findings     Row Name 01/14/22 0715          Physical Findings    Overall Physical Appearance obese     Skin surgical incision; other (see comments)  bruise                Estimated/Assessed Needs     Row " "Name 01/14/22 0714          Calculation Measurements    Height 177.8 cm (70\")                Nutrition Prescription Ordered     Row Name 01/14/22 0715          Nutrition Prescription PO    Current PO Diet Regular     Fluid Consistency Thin                       Problem/Interventions:   Problem 1     Row Name 01/14/22 0715          Nutrition Diagnoses Problem 1    Problem 1 Nutrition Appropriate for Condition at this Time     Etiology (related to) Medical Diagnosis                      Intervention Goal     Row Name 01/14/22 0717          Intervention Goal    General Maintain nutrition; Disease management/therapy; Reduce/improve symptoms     PO Tolerate PO; PO intake (%)     PO Intake % 75 %     Weight Appropriate weight loss                Nutrition Intervention     Row Name 01/14/22 0717          Nutrition Intervention    RD/Tech Action Follow Tx progress; Care plan reviewd; Menu provided                  Education/Evaluation     Row Name 01/14/22 0718          Education    Education Will Instruct as appropriate            Monitor/Evaluation    Monitor Per protocol                 Electronically signed by:  Yolis Kruger RD  01/14/22 07:20 EST  "

## 2022-01-14 NOTE — PLAN OF CARE
Goal Outcome Evaluation:              Outcome Summary: Improved independence w/ transfers and bed mobility.  Ambulated 150' w/ contact assist using RW, slow guarded gait pattern.  Held further activity due to increased BP, discussed with RN.  Reviewed activity/walking recommendations and PT POC.

## 2022-01-15 ENCOUNTER — APPOINTMENT (OUTPATIENT)
Dept: GENERAL RADIOLOGY | Facility: HOSPITAL | Age: 67
End: 2022-01-15

## 2022-01-15 PROBLEM — I46.9 CARDIAC ARREST: Status: ACTIVE | Noted: 2022-01-15

## 2022-01-15 LAB
ANION GAP SERPL CALCULATED.3IONS-SCNC: 5 MMOL/L (ref 5–15)
APTT PPP: 106 SECONDS (ref 22.7–35.4)
APTT PPP: 49.6 SECONDS (ref 22.7–35.4)
APTT PPP: 85.6 SECONDS (ref 22.7–35.4)
BASOPHILS # BLD AUTO: 0.04 10*3/MM3 (ref 0–0.2)
BASOPHILS NFR BLD AUTO: 0.6 % (ref 0–1.5)
BUN SERPL-MCNC: 11 MG/DL (ref 8–23)
BUN/CREAT SERPL: 18 (ref 7–25)
CALCIUM SPEC-SCNC: 8.3 MG/DL (ref 8.6–10.5)
CHLORIDE SERPL-SCNC: 105 MMOL/L (ref 98–107)
CO2 SERPL-SCNC: 27 MMOL/L (ref 22–29)
CREAT SERPL-MCNC: 0.61 MG/DL (ref 0.76–1.27)
DEPRECATED RDW RBC AUTO: 45 FL (ref 37–54)
EOSINOPHIL # BLD AUTO: 0.61 10*3/MM3 (ref 0–0.4)
EOSINOPHIL NFR BLD AUTO: 9 % (ref 0.3–6.2)
ERYTHROCYTE [DISTWIDTH] IN BLOOD BY AUTOMATED COUNT: 13.6 % (ref 12.3–15.4)
GFR SERPL CREATININE-BSD FRML MDRD: 132 ML/MIN/1.73
GLUCOSE SERPL-MCNC: 119 MG/DL (ref 65–99)
HCT VFR BLD AUTO: 33.8 % (ref 37.5–51)
HGB BLD-MCNC: 11.1 G/DL (ref 13–17.7)
IMM GRANULOCYTES # BLD AUTO: 0.02 10*3/MM3 (ref 0–0.05)
IMM GRANULOCYTES NFR BLD AUTO: 0.3 % (ref 0–0.5)
INR PPP: 1.34 (ref 0.9–1.1)
LYMPHOCYTES # BLD AUTO: 1.06 10*3/MM3 (ref 0.7–3.1)
LYMPHOCYTES NFR BLD AUTO: 15.6 % (ref 19.6–45.3)
MCH RBC QN AUTO: 30.5 PG (ref 26.6–33)
MCHC RBC AUTO-ENTMCNC: 32.8 G/DL (ref 31.5–35.7)
MCV RBC AUTO: 92.9 FL (ref 79–97)
MONOCYTES # BLD AUTO: 0.51 10*3/MM3 (ref 0.1–0.9)
MONOCYTES NFR BLD AUTO: 7.5 % (ref 5–12)
NEUTROPHILS NFR BLD AUTO: 4.57 10*3/MM3 (ref 1.7–7)
NEUTROPHILS NFR BLD AUTO: 67 % (ref 42.7–76)
NRBC BLD AUTO-RTO: 0.1 /100 WBC (ref 0–0.2)
PLATELET # BLD AUTO: 243 10*3/MM3 (ref 140–450)
PMV BLD AUTO: 8.6 FL (ref 6–12)
POTASSIUM SERPL-SCNC: 4.4 MMOL/L (ref 3.5–5.2)
PROTHROMBIN TIME: 16.4 SECONDS (ref 11.7–14.2)
RBC # BLD AUTO: 3.64 10*6/MM3 (ref 4.14–5.8)
SODIUM SERPL-SCNC: 137 MMOL/L (ref 136–145)
WBC NRBC COR # BLD: 6.81 10*3/MM3 (ref 3.4–10.8)

## 2022-01-15 PROCEDURE — 85025 COMPLETE CBC W/AUTO DIFF WBC: CPT | Performed by: INTERNAL MEDICINE

## 2022-01-15 PROCEDURE — 71045 X-RAY EXAM CHEST 1 VIEW: CPT

## 2022-01-15 PROCEDURE — 85730 THROMBOPLASTIN TIME PARTIAL: CPT | Performed by: NURSE PRACTITIONER

## 2022-01-15 PROCEDURE — 25010000002 HEPARIN (PORCINE) 25000-0.45 UT/250ML-% SOLUTION: Performed by: INTERNAL MEDICINE

## 2022-01-15 PROCEDURE — 80048 BASIC METABOLIC PNL TOTAL CA: CPT | Performed by: NURSE PRACTITIONER

## 2022-01-15 PROCEDURE — 99024 POSTOP FOLLOW-UP VISIT: CPT | Performed by: THORACIC SURGERY (CARDIOTHORACIC VASCULAR SURGERY)

## 2022-01-15 PROCEDURE — 36415 COLL VENOUS BLD VENIPUNCTURE: CPT | Performed by: NURSE PRACTITIONER

## 2022-01-15 PROCEDURE — 85610 PROTHROMBIN TIME: CPT | Performed by: NURSE PRACTITIONER

## 2022-01-15 RX ADMIN — POLYETHYLENE GLYCOL 3350 17 G: 17 POWDER, FOR SOLUTION ORAL at 08:08

## 2022-01-15 RX ADMIN — HEPARIN SODIUM 12 UNITS/KG/HR: 10000 INJECTION, SOLUTION INTRAVENOUS at 07:10

## 2022-01-15 RX ADMIN — METOPROLOL SUCCINATE 25 MG: 25 TABLET, EXTENDED RELEASE ORAL at 08:06

## 2022-01-15 RX ADMIN — DOCUSATE SODIUM 100 MG: 100 CAPSULE, LIQUID FILLED ORAL at 08:06

## 2022-01-15 RX ADMIN — DOCUSATE SODIUM 100 MG: 100 CAPSULE, LIQUID FILLED ORAL at 20:20

## 2022-01-15 RX ADMIN — ACETAMINOPHEN 1000 MG: 500 TABLET, FILM COATED ORAL at 16:16

## 2022-01-15 RX ADMIN — ACETAMINOPHEN 1000 MG: 500 TABLET, FILM COATED ORAL at 08:06

## 2022-01-15 RX ADMIN — ATORVASTATIN CALCIUM 10 MG: 20 TABLET, FILM COATED ORAL at 08:06

## 2022-01-15 RX ADMIN — ACETAMINOPHEN 1000 MG: 500 TABLET, FILM COATED ORAL at 20:20

## 2022-01-15 RX ADMIN — LISINOPRIL 10 MG: 10 TABLET ORAL at 08:06

## 2022-01-15 NOTE — PROGRESS NOTES
[facility]  [phone]  Hubert Prado MD    Subjective:  LOS: 13    Chief Complaint: Hemothorax    Sitting in a chair and doing quite well.  1 chest tube out today.  No significant chest pain.  No dizziness lightheadedness.    Objective   Vital Signs past 24hrs    Temp range: Temp (24hrs), Av.8 °F (36.6 °C), Min:97.6 °F (36.4 °C), Max:98 °F (36.7 °C)    BP range: BP: (128-154)/(74-81) 130/74  Pulse range: Heart Rate:  [67-78] 78  Resp rate range: Resp:  [18] 18    Device (Oxygen Therapy): room air   Oxygen range:SpO2:  [94 %-96 %] 94 %      104 kg (229 lb 12.8 oz); Body mass index is 32.97 kg/m².    Intake/Output Summary (Last 24 hours) at 1/15/2022 1633  Last data filed at 1/15/2022 1609  Gross per 24 hour   Intake 240 ml   Output 1935 ml   Net -1695 ml       Physical Exam  Eyes:      Pupils: Pupils are equal, round, and reactive to light.   Cardiovascular:      Rate and Rhythm: Normal rate and regular rhythm.      Heart sounds: No murmur heard.      Pulmonary:      Comments: Right chest tube with conducted sounds  Left clear  Abdominal:      General: Bowel sounds are normal.      Palpations: Abdomen is soft. There is no mass.      Tenderness: There is no abdominal tenderness.   Musculoskeletal:         General: No swelling.   Neurological:      Mental Status: He is alert.       Results Review:    I have reviewed the laboratory and imaging data since the last note by [facility] physician.  My annotations are noted in assessment and plan.    Medication Review:  I have reviewed the current MAR.  My annotations are noted in assessment and plan.    heparin, 12 Units/kg/hr, Last Rate: 15 Units/kg/hr (01/15/22 1318)  hold, 1 each  lactated ringers, 9 mL/hr, Last Rate: 9 mL/hr (22 1101)      Plan   PCCM Problems  Resuscitated PEA arrest; brief - 1/10  Acute hypoxic respiratory failure  Right spontaneous hemothorax s/p emergent Rt CT 1/10  S/p VATS   Acute blood loss anemia s/p PRBC  Hypovolemic  hypotension  Abdominal wall hematoma; improved -spontaneous  Coumadin coagulopathy  Mechanical aortic valve s/p full AC  Hyponatremia      THESE ARE NEW MEDICAL PROBLEMS TO ME.    Plan of Treatment    Doing quite well after VATS.  Now on room air.  Tolerating heparin drip.  Chest x-ray reviewed and looks pretty good.  Not much to add at this point.  A has taken over care.  We will sign off and be available as needed.    Hubert Prado MD  01/15/22  16:33 EST    While in the room and during my examination of the patient I wore gloves, gown, mask, eye protection and followed enhanced droplet/contact isolation protocol and precautions.  I washed my hands before and after this patient encounter.    Part of this note may be an electronic transcription/translation of spoken language to printed text using the Dragon Dictation System.

## 2022-01-15 NOTE — PROGRESS NOTES
Name: Claudio Krishna ADMIT: 2022   : 1955  PCP: Shayan Celeste MD    MRN: [id number] LOS: 13 days   AGE/SEX: 66 y.o. male  ROOM: [facility]     Subjective   Subjective   Patient seen this afternoon.  Sitting up in recliner, wife is present at bedside.  Anterior chest tube was removed today.  Patient complains of mild discomfort and pain in that area.  Still has posterior chest tube in place.   Review of Systems   As above  Objective   Objective   Vital Signs  Temp:  [97.6 °F (36.4 °C)-98 °F (36.7 °C)] 98 °F (36.7 °C)  Heart Rate:  [67-78] 78  Resp:  [18] 18  BP: (128-154)/(74-81) 130/74  SpO2:  [94 %-96 %] 94 %  on   ;   Device (Oxygen Therapy): room air  Body mass index is 32.97 kg/m².  Physical Exam  Constitutional:       Appearance: Normal appearance,  HENT:      Head: Normocephalic and atraumatic.   Cardiovascular:      Rate and Rhythm: Normal rate and regular rhythm.      Heart sounds Murmur heard.   Pulmonary:      Effort: Pulmonary effort is normal.      Breath sounds: Normal breath sounds, decreased breath sounds right lower quadrant.  Posterior chest tube in place.  Abdominal:      General: Bowel sounds are normal. There is no distension.      Palpations: Abdomen is soft.      Tenderness: There is no abdominal tenderness.      Comments:  bruising to his abdomen, worse on right side and extends to his right flank (significantly improved compared to admission.)  Skin:     General: Skin is warm and dry.   Neurological:      Mental Status: He is alert and oriented to person, place, and time. Mental status is at baseline.   Psychiatric:         Mood and Affect: Mood normal.         Behavior: Behavior normal.      Results Review     I reviewed the patient's new clinical results.  Results from last 7 days   Lab Units 01/15/22  0106 22  1809 22  1126 22  0337 22  1755 22  0405   WBC 10*3/mm3 6.81  --  6.69 7.77  --  10.30   HEMOGLOBIN g/dL 11.1* 11.1* 11.4* 10.8*   < >  11.2*  11.2*   PLATELETS 10*3/mm3 243  --  252 257  --  248    < > = values in this interval not displayed.     Results from last 7 days   Lab Units 01/15/22  0106 01/14/22  0337 01/13/22  0405 01/12/22  1101   SODIUM mmol/L 137 134* 134* 131*   POTASSIUM mmol/L 4.4 4.0 4.6 4.6   CHLORIDE mmol/L 105 100 100 100   CO2 mmol/L 27.0 26.8 26.1 24.8   BUN mg/dL 11 14 11 11   CREATININE mg/dL 0.61* 0.67* 0.53* 0.56*   GLUCOSE mg/dL 119* 110* 144* 163*   Estimated Creatinine Clearance: 109.7 mL/min (A) (by C-G formula based on SCr of 0.61 mg/dL (L)).  Results from last 7 days   Lab Units 01/10/22  0257   ALBUMIN g/dL 2.90*   BILIRUBIN mg/dL 0.9   ALK PHOS U/L 58   AST (SGOT) U/L 22   ALT (SGPT) U/L 22     Results from last 7 days   Lab Units 01/15/22  0106 01/14/22  0337 01/13/22  0405 01/12/22  1101 01/11/22  0410 01/10/22  0257   CALCIUM mg/dL 8.3* 8.0* 8.3* 7.9*   < > 8.1*   ALBUMIN g/dL  --   --   --   --   --  2.90*   MAGNESIUM mg/dL  --   --   --   --   --  1.9    < > = values in this interval not displayed.         COVID19   Date Value Ref Range Status   01/11/2022 Not Detected Not Detected - Ref. Range Final   01/01/2022 Not Detected Not Detected - Ref. Range Final     Glucose   Date/Time Value Ref Range Status   01/14/2022 1125 138 (H) 70 - 130 mg/dL Final     Comment:     Meter: [id number] : [id number] Rut Marti RN   01/14/2022 0502 111 70 - 130 mg/dL Final     Comment:     Meter: [id number] : [id number] Jama GILL   01/14/2022 0006 114 70 - 130 mg/dL Final     Comment:     Meter: [id number] : [id number] Jamamiki Cedeñoconrad GILL   01/13/2022 1112 146 (H) 70 - 130 mg/dL Final     Comment:     Meter: [id number] : [id number] Shane Baron RN   01/13/2022 0602 132 (H) 70 - 130 mg/dL Final     Comment:     Meter: [id number] : [id number] Jackie CORNEJO   01/12/2022 2301 177 (H) 70 - 130 mg/dL Final     Comment:     Meter: [id number] : [id number] Jackie CORNEJO   01/12/2022 1808  178 (H) 70 - 130 mg/dL Final     Comment:     Meter: [id number] : [id number] Ascencion Jaimes RN       XR Chest 1 View  Narrative: XR CHEST 1 VW-     HISTORY: Male who is 66 years-old,  chest tubes     TECHNIQUE: Frontal view of the chest     COMPARISON: 01/14/2022     FINDINGS: Right chest tubes appear stable. The heart is enlarged.  Sternotomy wires, cardiac valve marker noted. Pulmonary vasculature is  unremarkable. Minimal atelectasis or infiltrate at the right base. Right  apical pneumothorax measures 7 mm. Small right pleural effusion. No left  pneumothorax. Subcutaneous emphysema is seen at the right chest wall,  similar to prior exam. No acute osseous process.     Impression: Small right apical pneumothorax, right chest tubes in place.     This report was finalized on 1/15/2022 7:16 AM by Dr. Michael Smith M.D.       Scheduled Medications  acetaminophen, 1,000 mg, Oral, TID  atorvastatin, 10 mg, Oral, Daily  docusate sodium, 100 mg, Oral, BID  lisinopril, 10 mg, Oral, Q24H  metoprolol succinate XL, 25 mg, Oral, Q24H  polyethylene glycol, 17 g, Oral, Daily  senna-docusate sodium, 2 tablet, Oral, Nightly    Infusions  heparin, 12 Units/kg/hr, Last Rate: 15 Units/kg/hr (01/15/22 1318)  hold, 1 each  lactated ringers, 9 mL/hr, Last Rate: 9 mL/hr (01/13/22 0553)    Diet  Diet Regular; Thin       Assessment/Plan     Active Hospital Problems    Diagnosis  POA   • **Hemothorax [J94.2]  Unknown   • Cardiac arrest (HCC) [I46.9]  Unknown   • Mechanical heart valve present [Z95.2]  Not Applicable   • Essential hypertension [I10]  Unknown   • Abdominal wall hematoma [S30.1XXA]  Yes      Resolved Hospital Problems   No resolved problems to display.       Hemothorax/status post PEA arreast  -Cardiothoracic surgery consulted  -Patient has underwent a thoracentesis with 500 mL off  -was doing doing well initially, and was restarted on heparin drip with warfarin bridge.  However had PEA arrest; brief.  Resuscitated  successfully.-Head 1/10 s/p emergent Rt CT 1/10  S/p VATS 1/12  -Transferred back to the floor 01/14  -Currently on heparin drip, not on warfarin.  -Anterior chest tube removed 01/15  -Cardiothoracic surgery following.  -Hemoglobin stable around 11.      #Large abdominal wall hematoma/hemothorax  -general Surgery consulted, no indications for interventions.    -Hematoma improving.     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      #Essential hypertension  -Continue lisinopril, metoprolol  -blood pressure stable.    · heparin gtt   · Full code.  · Discussed with patient amd wife       Taniya Nicholas MD  [facility]  01/15/22  17:00 EST

## 2022-01-15 NOTE — PLAN OF CARE
Goal Outcome Evaluation:  Plan of Care Reviewed With: patient        Progress: improving  Outcome Summary: Patient alert, oriented x 4, pleasant; VSS. No c/o pain or SOA. One right side chest tube removed by NP at bedside and one remains to water seal. Dressing changed. Patient has ambulated twice today and been up in the chair much of the day; will ambulate again this shift. Will continue supportive care. Heparin drip continues; lab was late collecting PTT-adjusted per protocol.

## 2022-01-15 NOTE — PLAN OF CARE
Goal Outcome Evaluation:  Plan of Care Reviewed With: patient        Progress: improving  Outcome Summary: VSS. Alert and oriented x4. 2 right chest tubes to waterseal. Output marked and charted. No airleak. Pt denies pain. Heparin gtt continues. On room air. Normal sinus rhythm on monitor. Recheck at 0930. Will continue to provide supportive care.

## 2022-01-15 NOTE — PROGRESS NOTES
"  POST-OPERATIVE NOTE     Chief Complaint: Hemothorax  S/P: Bronchoscopy, right video-assisted thoracoscopy with complete decortication and evacuation of hemothorax  POD # 3    Subjective:  Symptoms:  Improved.  No shortness of breath or chest pain.    Diet:  No nausea or vomiting.    Activity level: Returning to normal.    Pain:  He complains of pain that is mild.    He is out of bed to chair.    Objective:  General Appearance:  Comfortable and in no acute distress.    Vital signs: (most recent): Blood pressure 154/80, pulse 67, temperature 97.6 °F (36.4 °C), temperature source Oral, resp. rate 18, height 177.8 cm (70\"), weight 104 kg (229 lb 12.8 oz), SpO2 96 %.  Vital signs are normal.  No fever.    Output: Producing urine.    Lungs:  Normal effort and normal respiratory rate.  There are decreased breath sounds.  No wheezes or rhonchi.    Heart: Normal rate.  Regular rhythm.    Abdomen: Abdomen is soft.  There is no abdominal tenderness.     Pulses: Distal pulses are intact.    Neurological: Patient is alert and oriented to person, place and time.    Skin:  Warm and dry.              Chest tube:   Site: Right, Clean, Dry, Intact and Securement device intact  Suction: -20 cm  Air Leak: negative  24 Hour Total: 12/260cc    Results Review:     I reviewed the patient's new clinical results.  I reviewed the patient's new imaging results and agree with the interpretation.  I reviewed the patient's other test results and agree with the interpretation    Assessment/Plan     Mr. Krishna is a pleasant 66-year-old gentleman with a hemothorax status post VATS decortication.  He is doing really well.  He is on a heparin drip without any increase in his output.  We will plan to discontinue his anterior chest tube today and likely get his posterior chest tube out on Monday.  Continue heparin drip for now.  Continue ambulation and pulmonary toilet.    Sol Andrews MD  [facility]  01/15/22  11:50 " EST    Patient was seen and assessed while wearing personal protective equipment including facemask, protective eyewear and gloves.  Hand hygiene performed prior to entering the room and upon exiting with doffing of gloves.

## 2022-01-15 NOTE — PROGRESS NOTES
Claudio Tomi   66 y.o.  male    LOS: 13 days   Patient Care Team:  Shayan Celeste MD as PCP - General (Family Medicine)      Subjective  ( pt of Dr Leyva)    1 chest tube out today, second one to come out Monday hes hoping    Medication Review:   Current Facility-Administered Medications:   •  acetaminophen (TYLENOL) tablet 1,000 mg, 1,000 mg, Oral, TID, Leyda Rodriguez, APRN, 1,000 mg at 01/15/22 0806  •  acetaminophen (TYLENOL) tablet 650 mg, 650 mg, Oral, Q4H PRN, Leyda Rodriguez APRN, 650 mg at 01/10/22 1640  •  atorvastatin (LIPITOR) tablet 10 mg, 10 mg, Oral, Daily, Leyda Rodriguez APRN, 10 mg at 01/15/22 0806  •  benzonatate (TESSALON) capsule 100 mg, 100 mg, Oral, TID PRN, Leyda Rodriguez APRN, 100 mg at 01/07/22 0933  •  bisacodyl (DULCOLAX) suppository 10 mg, 10 mg, Rectal, Daily PRN, Leyda Rodriguez APRN  •  docusate sodium (COLACE) capsule 100 mg, 100 mg, Oral, BID, Leyda Rodriguez APRN, 100 mg at 01/15/22 0806  •  heparin 44812 units/250 mL (100 units/mL) in 0.45 % NaCl infusion, 12 Units/kg/hr, Intravenous, Titrated, Colin Yun MD, Last Rate: 15.75 mL/hr at 01/15/22 1318, 15 Units/kg/hr at 01/15/22 1318  •  Hold medication, 1 each, Does not apply, Continuous PRN, Leyda Rodriguez APRN  •  HYDROcodone-acetaminophen (NORCO) 5-325 MG per tablet 1 tablet, 1 tablet, Oral, Q6H PRN, Leyda Rodriguez APRN  •  HYDROmorphone (DILAUDID) injection 0.5 mg, 0.5 mg, Intravenous, Q2H PRN, Leyda Rodriguez APRN, 0.5 mg at 01/12/22 1645  •  ketamine hcl syringe solution prefilled syringe 10 mg, 10 mg, Intravenous, Q15 Min PRN, Leyda Rodriguez APRN  •  lactated ringers infusion, 9 mL/hr, Intravenous, Continuous, Leyda Rodriguez APRN, Last Rate: 9 mL/hr at 01/13/22 0553, 9 mL/hr at 01/13/22 0553  •  lisinopril (PRINIVIL,ZESTRIL) tablet 10 mg, 10 mg, Oral, Q24H, Colin Yun MD, 10 mg at 01/15/22 0806  •  magnesium hydroxide (MILK OF MAGNESIA) suspension 10 mL, 10 mL, Oral, Daily PRN,  Leyda Rodriguez APRN  •  melatonin tablet 3 mg, 3 mg, Oral, Nightly PRN, Leyda Rodriguez APRN, 3 mg at 01/10/22 0120  •  metoprolol succinate XL (TOPROL-XL) 24 hr tablet 25 mg, 25 mg, Oral, Q24H, Colin Yun MD, 25 mg at 01/15/22 0806  •  morphine injection 2 mg, 2 mg, Intravenous, Q4H PRN, Leyda Rodriguez APRN, 2 mg at 01/11/22 2317  •  nitroglycerin (NITROSTAT) SL tablet 0.4 mg, 0.4 mg, Sublingual, Q5 Min PRN, Leyda Rodriguez APRN  •  ondansetron (ZOFRAN) tablet 4 mg, 4 mg, Oral, Q6H PRN **OR** ondansetron (ZOFRAN) injection 4 mg, 4 mg, Intravenous, Q6H PRN, Leyda Rodriguez APRN  •  oxyCODONE (ROXICODONE) immediate release tablet 5 mg, 5 mg, Oral, Q4H PRN, Leyda Rodriguez APRN  •  polyethylene glycol (MIRALAX) packet 17 g, 17 g, Oral, Daily, Leyda Rodriguez APRN, 17 g at 01/15/22 0808  •  sennosides-docusate (PERICOLACE) 8.6-50 MG per tablet 2 tablet, 2 tablet, Oral, Nightly, Leyda Rodriguez APRN, 2 tablet at 01/14/22 2045  •  [COMPLETED] Insert peripheral IV, , , Once **AND** sodium chloride 0.9 % flush 10 mL, 10 mL, Intravenous, PRN, Leyda Rodriguez APRN      Objective     Vital Sign Min/Max for last 24 hours  Temp  Min: 97.6 °F (36.4 °C)  Max: 98 °F (36.7 °C)   BP  Min: 128/75  Max: 166/86    Pulse  Min: 67  Max: 78     Wt Readings from Last 3 Encounters:   01/15/22 104 kg (229 lb 12.8 oz)        Intake/Output Summary (Last 24 hours) at 1/15/2022 1555  Last data filed at 1/15/2022 0800  Gross per 24 hour   Intake 240 ml   Output 1905 ml   Net -1665 ml     Physical Exam:   gen awake nad  Chest cta 1 tube in  cvs regular mechanical valve click noted  abdo soft  Ext no edema     Monitor:  nsr  Results Review:     I reviewed the patient's new clinical results.    Sodium Sodium   Date Value Ref Range Status   01/15/2022 137 136 - 145 mmol/L Final   01/14/2022 134 (L) 136 - 145 mmol/L Final   01/13/2022 134 (L) 136 - 145 mmol/L Final      Potassium Potassium   Date Value Ref Range Status    01/15/2022 4.4 3.5 - 5.2 mmol/L Final   01/14/2022 4.0 3.5 - 5.2 mmol/L Final   01/13/2022 4.6 3.5 - 5.2 mmol/L Final      Chloride Chloride   Date Value Ref Range Status   01/15/2022 105 98 - 107 mmol/L Final   01/14/2022 100 98 - 107 mmol/L Final   01/13/2022 100 98 - 107 mmol/L Final      Bicarbonate No results found for: PLASMABICARB   BUN BUN   Date Value Ref Range Status   01/15/2022 11 8 - 23 mg/dL Final   01/14/2022 14 8 - 23 mg/dL Final   01/13/2022 11 8 - 23 mg/dL Final      Creatinine Creatinine   Date Value Ref Range Status   01/15/2022 0.61 (L) 0.76 - 1.27 mg/dL Final   01/14/2022 0.67 (L) 0.76 - 1.27 mg/dL Final   01/13/2022 0.53 (L) 0.76 - 1.27 mg/dL Final      Calcium Calcium   Date Value Ref Range Status   01/15/2022 8.3 (L) 8.6 - 10.5 mg/dL Final   01/14/2022 8.0 (L) 8.6 - 10.5 mg/dL Final   01/13/2022 8.3 (L) 8.6 - 10.5 mg/dL Final      Magnesium No results found for: MG     Results from last 7 days   Lab Units 01/15/22  0106   WBC 10*3/mm3 6.81   HEMOGLOBIN g/dL 11.1*   HEMATOCRIT % 33.8*   PLATELETS 10*3/mm3 243     Lab Results   Lab Value Date/Time    TROPONINT <0.010 01/10/2022 0257     No results found for: CHOL  No results found for: HDL  No results found for: LDL  No results found for: TRIG  No components found for: CHOLHDL    Results from last 7 days   Lab Units 01/15/22  0106 01/14/22  1126 01/14/22  0337   INR  1.34* 1.54* 1.76*         Echo EF Estimated  No results found for: ECHOEFEST       Assessment/ Plan  1. Hemothorax;  -Came in with spontaneous since heavy coughing and on anticoagulation at home, had pleural effusion on admit  -underwent thoracentesis 1/3 500 cc bloody fluid drained  -Code blue on 1/9/2022.bradycardiac arrest: CPR done, worsening hemothorax hence had Bronchoscopy, right video-assisted thoracoscopy with complete decortication and evacuation of hemothorax on 1/12/2022    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 [employer]  mechanical prosthesis       -on Warfarin       -follows with Dr Leyva     3. HTN      Overnight stable 1 chest tube out, 1 remaining   bp 120-140s systolic  Tele sinus rare PVCs  Labs stable   Started back heparin gtt, needs to transition to coumadin when no more procedures pending, avoid bridging (AVR low risk for thrombosis, and with recent bleeding issues), when surgery team ok's will just start coumadin continue heparin until then      Griselda Betancourt MD  01/15/22  15:55 EST

## 2022-01-16 ENCOUNTER — APPOINTMENT (OUTPATIENT)
Dept: GENERAL RADIOLOGY | Facility: HOSPITAL | Age: 67
End: 2022-01-16

## 2022-01-16 LAB
ANION GAP SERPL CALCULATED.3IONS-SCNC: 5.4 MMOL/L (ref 5–15)
APTT PPP: 69.6 SECONDS (ref 22.7–35.4)
BASOPHILS # BLD AUTO: 0.04 10*3/MM3 (ref 0–0.2)
BASOPHILS NFR BLD AUTO: 0.7 % (ref 0–1.5)
BUN SERPL-MCNC: 10 MG/DL (ref 8–23)
BUN/CREAT SERPL: 14.9 (ref 7–25)
CALCIUM SPEC-SCNC: 8.7 MG/DL (ref 8.6–10.5)
CHLORIDE SERPL-SCNC: 103 MMOL/L (ref 98–107)
CO2 SERPL-SCNC: 29.6 MMOL/L (ref 22–29)
CREAT SERPL-MCNC: 0.67 MG/DL (ref 0.76–1.27)
DEPRECATED RDW RBC AUTO: 46.9 FL (ref 37–54)
EOSINOPHIL # BLD AUTO: 0.69 10*3/MM3 (ref 0–0.4)
EOSINOPHIL NFR BLD AUTO: 11.2 % (ref 0.3–6.2)
ERYTHROCYTE [DISTWIDTH] IN BLOOD BY AUTOMATED COUNT: 13.3 % (ref 12.3–15.4)
GFR SERPL CREATININE-BSD FRML MDRD: 119 ML/MIN/1.73
GLUCOSE BLDC GLUCOMTR-MCNC: 109 MG/DL (ref 70–130)
GLUCOSE SERPL-MCNC: 116 MG/DL (ref 65–99)
HCT VFR BLD AUTO: 35.6 % (ref 37.5–51)
HCT VFR BLD AUTO: 35.9 % (ref 37.5–51)
HGB BLD-MCNC: 11.4 G/DL (ref 13–17.7)
HGB BLD-MCNC: 11.4 G/DL (ref 13–17.7)
IMM GRANULOCYTES # BLD AUTO: 0.05 10*3/MM3 (ref 0–0.05)
IMM GRANULOCYTES NFR BLD AUTO: 0.8 % (ref 0–0.5)
INR PPP: 1.28 (ref 0.9–1.1)
LYMPHOCYTES # BLD AUTO: 1.04 10*3/MM3 (ref 0.7–3.1)
LYMPHOCYTES NFR BLD AUTO: 16.9 % (ref 19.6–45.3)
MCH RBC QN AUTO: 30.5 PG (ref 26.6–33)
MCHC RBC AUTO-ENTMCNC: 31.8 G/DL (ref 31.5–35.7)
MCV RBC AUTO: 96 FL (ref 79–97)
MONOCYTES # BLD AUTO: 0.48 10*3/MM3 (ref 0.1–0.9)
MONOCYTES NFR BLD AUTO: 7.8 % (ref 5–12)
NEUTROPHILS NFR BLD AUTO: 3.85 10*3/MM3 (ref 1.7–7)
NEUTROPHILS NFR BLD AUTO: 62.6 % (ref 42.7–76)
NRBC BLD AUTO-RTO: 0 /100 WBC (ref 0–0.2)
PLATELET # BLD AUTO: 254 10*3/MM3 (ref 140–450)
PMV BLD AUTO: 9 FL (ref 6–12)
POTASSIUM SERPL-SCNC: 5 MMOL/L (ref 3.5–5.2)
PROTHROMBIN TIME: 15.8 SECONDS (ref 11.7–14.2)
RBC # BLD AUTO: 3.74 10*6/MM3 (ref 4.14–5.8)
SODIUM SERPL-SCNC: 138 MMOL/L (ref 136–145)
WBC NRBC COR # BLD: 6.15 10*3/MM3 (ref 3.4–10.8)

## 2022-01-16 PROCEDURE — 85014 HEMATOCRIT: CPT | Performed by: STUDENT IN AN ORGANIZED HEALTH CARE EDUCATION/TRAINING PROGRAM

## 2022-01-16 PROCEDURE — 97110 THERAPEUTIC EXERCISES: CPT | Performed by: PHYSICAL THERAPIST

## 2022-01-16 PROCEDURE — 80048 BASIC METABOLIC PNL TOTAL CA: CPT | Performed by: NURSE PRACTITIONER

## 2022-01-16 PROCEDURE — 85610 PROTHROMBIN TIME: CPT | Performed by: NURSE PRACTITIONER

## 2022-01-16 PROCEDURE — 85730 THROMBOPLASTIN TIME PARTIAL: CPT | Performed by: INTERNAL MEDICINE

## 2022-01-16 PROCEDURE — 99024 POSTOP FOLLOW-UP VISIT: CPT | Performed by: NURSE PRACTITIONER

## 2022-01-16 PROCEDURE — 82962 GLUCOSE BLOOD TEST: CPT

## 2022-01-16 PROCEDURE — 25010000002 HEPARIN (PORCINE) 25000-0.45 UT/250ML-% SOLUTION: Performed by: INTERNAL MEDICINE

## 2022-01-16 PROCEDURE — 71045 X-RAY EXAM CHEST 1 VIEW: CPT

## 2022-01-16 PROCEDURE — 36415 COLL VENOUS BLD VENIPUNCTURE: CPT | Performed by: INTERNAL MEDICINE

## 2022-01-16 PROCEDURE — 85025 COMPLETE CBC W/AUTO DIFF WBC: CPT | Performed by: INTERNAL MEDICINE

## 2022-01-16 PROCEDURE — 85018 HEMOGLOBIN: CPT | Performed by: STUDENT IN AN ORGANIZED HEALTH CARE EDUCATION/TRAINING PROGRAM

## 2022-01-16 RX ADMIN — HEPARIN SODIUM 15 UNITS/KG/HR: 10000 INJECTION, SOLUTION INTRAVENOUS at 15:39

## 2022-01-16 RX ADMIN — ACETAMINOPHEN 1000 MG: 500 TABLET, FILM COATED ORAL at 08:21

## 2022-01-16 RX ADMIN — ACETAMINOPHEN 1000 MG: 500 TABLET, FILM COATED ORAL at 20:23

## 2022-01-16 RX ADMIN — ACETAMINOPHEN 1000 MG: 500 TABLET, FILM COATED ORAL at 15:11

## 2022-01-16 RX ADMIN — DOCUSATE SODIUM 100 MG: 100 CAPSULE, LIQUID FILLED ORAL at 20:23

## 2022-01-16 RX ADMIN — HEPARIN SODIUM 15 UNITS/KG/HR: 10000 INJECTION, SOLUTION INTRAVENOUS at 01:37

## 2022-01-16 RX ADMIN — DOCUSATE SODIUM 100 MG: 100 CAPSULE, LIQUID FILLED ORAL at 08:21

## 2022-01-16 RX ADMIN — LISINOPRIL 10 MG: 10 TABLET ORAL at 08:21

## 2022-01-16 RX ADMIN — METOPROLOL SUCCINATE 25 MG: 25 TABLET, EXTENDED RELEASE ORAL at 08:21

## 2022-01-16 RX ADMIN — ATORVASTATIN CALCIUM 10 MG: 20 TABLET, FILM COATED ORAL at 08:21

## 2022-01-16 NOTE — THERAPY TREATMENT NOTE
Patient Name: Claudio Krishna  : 1955    MRN: [id number]                              Today's Date: 2022       Admit Date: 2022    Visit Dx:     ICD-10-CM ICD-9-CM   1. Abdominal wall hematoma, initial encounter  S30.1XXA 922.2   2. Hemothorax on right  J94.2 511.89   3. Chronic anticoagulation  Z79.01 V58.61   4. Mechanical heart valve present  Z95.2 V43.3   5. Hemothorax  J94.2 511.89     Patient Active Problem List   Diagnosis   • Abdominal wall hematoma   • Coronary artery disease   • Mechanical heart valve present   • Hemothorax   • Essential hypertension   • Cardiac arrest (HCC)     Past Medical History:   Diagnosis Date   • Coronary artery disease    • Hypertension      Past Surgical History:   Procedure Laterality Date   • AORTA SURGERY  2011   • HERNIA REPAIR     • KNEE MINI REVISION     • THORACOSCOPY WITH DECORTICATION Right 2022    Procedure: BRONCHOSCOPY, RIGHT THORACOSCOPY VIDEO ASSISTED WITH COMPLETE DECORTICATION;  Surgeon: Sol Andrews MD;  Location: [facility];  Service: Thoracic;  Laterality: Right;      General Information     Row Name 22 1336          Physical Therapy Time and Intention    Document Type therapy note (daily note)  -SC     Mode of Treatment individual therapy; physical therapy  -SC     Row Name 22 1336          General Information    Patient Profile Reviewed yes  -SC     Existing Precautions/Restrictions fall  chest tube/drain  -SC     Barriers to Rehab none identified  -SC     Row Name 22 1336          Cognition    Orientation Status (Cognition) oriented x 4  -SC     Row Name 22 1336          Safety Issues, Functional Mobility    Impairments Affecting Function (Mobility) endurance/activity tolerance; balance  -SC     Comment, Safety Issues/Impairments (Mobility) non skid socks  -SC           User Key  (r) = Recorded By, (t) = Taken By, (c) = Cosigned By    Initials Name Provider Type    SC Leyda Ames, PT  Physical Therapist               Mobility     Row Name 01/16/22 1336          Bed Mobility    Bed Mobility sit-supine  -SC     Sit-Supine Hamilton (Bed Mobility) set up; verbal cues; nonverbal cues (demo/gesture); contact guard; 1 person to manage equipment  -SC     Row Name 01/16/22 1336          Sit-Stand Transfer    Sit-Stand Hamilton (Transfers) set up; verbal cues; nonverbal cues (demo/gesture); contact guard; 1 person to manage equipment  -SC     Assistive Device (Sit-Stand Transfers) walker, rolling platform  tall black walker on unit  -SC     Row Name 01/16/22 1336          Gait/Stairs (Locomotion)    Hamilton Level (Gait) set up; verbal cues; nonverbal cues (demo/gesture); contact guard; 1 person to manage equipment  -SC     Assistive Device (Gait) walker, rolling platform  tall black walker on unit  -SC     Distance in Feet (Gait) 500 feet  -SC     Bilateral Gait Deviations forward flexed posture  -SC     Hamilton Level (Stairs) not tested  -SC     Comment (Gait/Stairs) no LOB noted  -SC           User Key  (r) = Recorded By, (t) = Taken By, (c) = Cosigned By    Initials Name Provider Type    SC Leyda Ames PT Physical Therapist               Obj/Interventions     Row Name 01/16/22 1336          Balance    Balance Assessment sitting static balance; sitting dynamic balance; standing static balance; standing dynamic balance  -SC     Static Sitting Balance WNL; supported; sitting in chair  -SC     Dynamic Sitting Balance WNL; supported; sitting in chair  -SC     Static Standing Balance WFL; supported; standing  -SC     Dynamic Standing Balance WFL; mild impairment; supported; standing  -SC     Balance Interventions sitting; standing; sit to stand; static; dynamic; minimal challenge; supported  -SC           User Key  (r) = Recorded By, (t) = Taken By, (c) = Cosigned By    Initials Name Provider Type    SC Leyda Ames PT Physical Therapist               Goals/Plan    No  documentation.                Clinical Impression     [facility] Name 01/16/22 1336          Pain Scale: Numbers Pre/Post-Treatment    Pretreatment Pain Rating 0/10 - no pain  -SC     Posttreatment Pain Rating 2/10  -SC     Pain Location - Side Right  -SC     Pain Location - Orientation incisional  -SC     Pain Location chest  -SC     Pain Intervention(s) Medication (See MAR); Repositioned; Ambulation/increased activity; Elevated; Rest  -[facility] Name 01/16/22 1336          Plan of Care Review    Plan of Care Reviewed With patient; spouse  -SC     Progress improving  -SC     Outcome Summary Able to ambulate 500 feet with platform rolling walker on unit with assistance for equipment, CGA with no increased symptoms or LOB. Will continue to monitor as chest tube removed with goal to return to home with family for support once medically stable.  -[facility] Name 01/16/22 1331          Therapy Assessment/Plan (PT)    Patient/Family Therapy Goals Statement (PT) return home. drain out.  -SC     Rehab Potential (PT) good, to achieve stated therapy goals  -SC     Criteria for Skilled Interventions Met (PT) yes; meets criteria; skilled treatment is necessary  -[facility] Name 01/16/22 1336          Vital Signs    O2 Delivery Pre Treatment room air  -SC     O2 Delivery Intra Treatment room air  -SC     O2 Delivery Post Treatment room air  -SC     Pre Patient Position Sitting  -SC     Intra Patient Position Standing  -SC     Post Patient Position Supine  -[facility] Name 01/16/22 1333          Positioning and Restraints    Pre-Treatment Position sitting in chair/recliner  -SC     Post Treatment Position bed  -SC     In Bed fowlers; call light within reach; encouraged to call for assist; exit alarm on; with family/caregiver  -SC           User Key  (r) = Recorded By, (t) = Taken By, (c) = Cosigned By    Initials Name Provider Type    SC Leyda Ames, PT Physical Therapist               Outcome Measures     Row Name 01/16/22  1336          How much help from another person do you currently need...    Turning from your back to your side while in flat bed without using bedrails? 4  -SC     Moving from lying on back to sitting on the side of a flat bed without bedrails? 3  -SC     Moving to and from a bed to a chair (including a wheelchair)? 3  -SC     Standing up from a chair using your arms (e.g., wheelchair, bedside chair)? 3  -SC     Climbing 3-5 steps with a railing? 2  -SC     To walk in hospital room? 3  -SC     AM-PAC 6 Clicks Score (PT) 18  -SC     Row Name 01/16/22 1336          Functional Assessment    Outcome Measure Options AM-PAC 6 Clicks Basic Mobility (PT)  -SC           User Key  (r) = Recorded By, (t) = Taken By, (c) = Cosigned By    Initials Name Provider Type    SC Leyda Ames, PT Physical Therapist                             Physical Therapy Education                 Title: PT OT SLP Therapies (Done)     Topic: Physical Therapy (Done)     Point: Mobility training (Done)     Learning Progress Summary           Patient Acceptance, E,TB,D, VU,DU by SC at 1/16/2022 1443    Acceptance, E, NR by AR at 1/14/2022 1654    Acceptance, E,TB,D, VU,NR by  at 1/13/2022 1650   Family Acceptance, E,TB,D, VU,DU by SC at 1/16/2022 1443                   Point: Home exercise program (Done)     Learning Progress Summary           Patient Acceptance, E,TB,D, VU,DU by SC at 1/16/2022 1443    Acceptance, E, NR by AR at 1/14/2022 1654   Family Acceptance, E,TB,D, VU,DU by SC at 1/16/2022 1443                   Point: Body mechanics (Done)     Learning Progress Summary           Patient Acceptance, E,TB,D, VU,DU by SC at 1/16/2022 1443    Acceptance, E, NR by AR at 1/14/2022 1654    Acceptance, E,TB,D, VU,NR by  at 1/13/2022 1650   Family Acceptance, E,TB,D, VU,DU by SC at 1/16/2022 1443                   Point: Precautions (Done)     Learning Progress Summary           Patient Acceptance, E,TB,D, VU,DU by SC at 1/16/2022 1442     Acceptance, E, NR by AR at 1/14/2022 1654    Acceptance, E,TB,D, VU,NR by  at 1/13/2022 1650   Family Acceptance, E,TB,D, VU,DU by SC at 1/16/2022 1443                               User Key     Initials Effective Dates Name Provider Type Discipline    SC 06/16/21 -  Leyda Ames, PT Physical Therapist PT     06/16/21 -  Sherley Bob, PT Physical Therapist PT    AR 06/16/21 -  Stephanie Thompson PT Physical Therapist PT              PT Recommendation and Plan     Plan of Care Reviewed With: patient, spouse  Progress: improving  Outcome Summary: Able to ambulate 500 feet with platform rolling walker on unit with assistance for equipment, CGA with no increased symptoms or LOB. Will continue to monitor as chest tube removed with goal to return to home with family for support once medically stable.     Time Calculation:    PT Charges     Row Name 01/16/22 1336             Time Calculation    Start Time 1336  -SC      Stop Time 1352  -SC      Time Calculation (min) 16 min  -SC      PT Received On 01/16/22  -SC      PT - Next Appointment 01/17/22  -SC              Timed Charges    81541 - PT Therapeutic Exercise Minutes 16  -SC              Total Minutes    Timed Charges Total Minutes 16  -SC       Total Minutes 16  -SC            User Key  (r) = Recorded By, (t) = Taken By, (c) = Cosigned By    Initials Name Provider Type    SC Leyda Ames, PT Physical Therapist              Therapy Charges for Today     Code Description Service Date Service Provider Modifiers Qty    36777234877 HC PT THER PROC EA 15 MIN 1/16/2022 Leyda Ames PT GP 1          PT G-Codes  Outcome Measure Options: AM-PAC 6 Clicks Basic Mobility (PT)  AM-PAC 6 Clicks Score (PT): 18    Leyda Sánchez-KITTY Ames  1/16/2022

## 2022-01-16 NOTE — PLAN OF CARE
Goal Outcome Evaluation:  Plan of Care Reviewed With: patient        Progress: improving  Outcome Summary: VSS. Alert and oriented x4. Patient denies pain. Right CT to waterseal. No airleak. Heparin gtt therapeutic at 15 units/kg/hr. Will continue to provide supportive care.

## 2022-01-16 NOTE — PROGRESS NOTES
"  POST-OPERATIVE NOTE     Chief Complaint: Hemothorax  S/P: Bronchoscopy, right video-assisted thoracoscopy with complete decortication and evacuation of hemothorax  POD # 4    Subjective:  Symptoms:  Improved.  No shortness of breath or chest pain.    Diet:  No nausea or vomiting.    Activity level: Returning to normal.    Pain:  He complains of pain that is mild.        Objective:  General Appearance:  Comfortable and in no acute distress.    Vital signs: (most recent): Blood pressure 121/63, pulse 89, temperature 98.1 °F (36.7 °C), temperature source Oral, resp. rate 17, height 177.8 cm (70\"), weight 99.9 kg (220 lb 3.8 oz), SpO2 95 %.  Vital signs are normal.  No fever.    Output: Producing urine.    Lungs:  Normal effort and normal respiratory rate.  There are decreased breath sounds.  No wheezes or rhonchi.    Heart: Normal rate.  Regular rhythm.    Abdomen: Abdomen is soft.  There is no abdominal tenderness.     Pulses: Distal pulses are intact.    Neurological: Patient is alert and oriented to person, place and time.    Skin:  Warm and dry.              Chest tube:   Site: Right, Clean, Dry, Intact and Securement device intact  Suction: waterseal  Air Leak: negative  24 Hour Total: 120cc    Results Review:     I reviewed the patient's new clinical results.  I reviewed the patient's new imaging results and agree with the interpretation.  I reviewed the patient's other test results and agree with the interpretation.     Assessment/Plan     Mr. Krishna is a pleasant 66-year-old gentleman with a hemothorax status post VATS decortication.  He is doing really well.  He is on a heparin drip without any increase in his output.  Chest x-ray is stable today s/p anterior chest tube removal.  We will plan to leave his remaining chest tube to waterseal today and check an x-ray in the morning.  Likely we can remove this tube tomorrow and then patient can resume oral anticoagulation.  Discussed plan at length with patient " and his spouse at the bedside.   Noted cardiology plans to reinitiate anticoagulation without bridge after chest tube removal.  Continue ambulation and pulmonary toilet.    DWAYNE Martin  [facility]  01/16/22  11:54 EST    Patient was seen and assessed while wearing personal protective equipment including facemask, protective eyewear and gloves.  Hand hygiene performed prior to entering the room and upon exiting with doffing of gloves.

## 2022-01-16 NOTE — PROGRESS NOTES
Name: Claudio Krishna ADMIT: 2022   : 1955  PCP: Shayan Celeste MD    MRN: [id number] LOS: 14 days   AGE/SEX: 66 y.o. male  ROOM: [facility]     Subjective   Subjective   Patient seen this morning.  No new events overnight.  In bed, no acute distress.  Feel slightly better today.  Denies chest pain, shortness of breath.  No fevers no chills.  Has pain with coughing in his chest tube area.     Review of Systems   As above  Objective   Objective   Vital Signs  Temp:  [98 °F (36.7 °C)-98.7 °F (37.1 °C)] 98.1 °F (36.7 °C)  Heart Rate:  [65-89] 89  Resp:  [17-18] 17  BP: (118-134)/(63-74) 121/63  SpO2:  [94 %-95 %] 95 %  on   ;   Device (Oxygen Therapy): room air  Body mass index is 31.6 kg/m².  Physical Exam  Constitutional:       Appearance: Normal appearance,  HENT:      Head: Normocephalic and atraumatic.   Cardiovascular:      Rate and Rhythm: Normal rate and regular rhythm.      Heart sounds Murmur heard.   Pulmonary:      Effort: Pulmonary effort is normal.      Breath sounds: Normal breath sounds, decreased breath sounds right lower quadrant.  Posterior chest tube in place.  Abdominal:      General: Bowel sounds are normal. There is no distension.      Palpations: Abdomen is soft.      Tenderness: There is no abdominal tenderness.      Comments:  bruising to his abdomen, worse on right side and extends to his right flank (significantly improved compared to admission.)  Skin:     General: Skin is warm and dry.   Neurological:      Mental Status: He is alert and oriented to person, place, and time. Mental status is at baseline.   Psychiatric:         Mood and Affect: Mood normal.         Behavior: Behavior normal.      Results Review     I reviewed the patient's new clinical results.  Results from last 7 days   Lab Units 22  0328 01/15/22  0106 22  1809 22  1126 22  0337 22  0337   WBC 10*3/mm3 6.15 6.81  --  6.69  --  7.77   HEMOGLOBIN g/dL 11.4* 11.1* 11.1* 11.4*   < >  10.8*   PLATELETS 10*3/mm3 254 243  --  252  --  257    < > = values in this interval not displayed.     Results from last 7 days   Lab Units 01/16/22  0328 01/15/22  0106 01/14/22  0337 01/13/22  0405   SODIUM mmol/L 138 137 134* 134*   POTASSIUM mmol/L 5.0 4.4 4.0 4.6   CHLORIDE mmol/L 103 105 100 100   CO2 mmol/L 29.6* 27.0 26.8 26.1   BUN mg/dL 10 11 14 11   CREATININE mg/dL 0.67* 0.61* 0.67* 0.53*   GLUCOSE mg/dL 116* 119* 110* 144*   Estimated Creatinine Clearance: 107.7 mL/min (A) (by C-G formula based on SCr of 0.67 mg/dL (L)).  Results from last 7 days   Lab Units 01/10/22  0257   ALBUMIN g/dL 2.90*   BILIRUBIN mg/dL 0.9   ALK PHOS U/L 58   AST (SGOT) U/L 22   ALT (SGPT) U/L 22     Results from last 7 days   Lab Units 01/16/22  0328 01/15/22  0106 01/14/22  0337 01/13/22  0405 01/11/22  0410 01/10/22  0257   CALCIUM mg/dL 8.7 8.3* 8.0* 8.3*   < > 8.1*   ALBUMIN g/dL  --   --   --   --   --  2.90*   MAGNESIUM mg/dL  --   --   --   --   --  1.9    < > = values in this interval not displayed.         COVID19   Date Value Ref Range Status   01/11/2022 Not Detected Not Detected - Ref. Range Final   01/01/2022 Not Detected Not Detected - Ref. Range Final     Glucose   Date/Time Value Ref Range Status   01/14/2022 1125 138 (H) 70 - 130 mg/dL Final     Comment:     Meter: [id number] : [id number] Rut Marti RN   01/14/2022 0502 111 70 - 130 mg/dL Final     Comment:     Meter: [id number] : [id number] Jama GILL   01/14/2022 0006 114 70 - 130 mg/dL Final     Comment:     Meter: [id number] : [id number] Jama GILL       XR Chest 1 View  Narrative: XR CHEST 1 VW-     HISTORY: Male who is 66 years-old,  chest tube removal     TECHNIQUE: Frontal view of the chest     COMPARISON: 01/15/2022     FINDINGS: 1 right chest tube has been removed. One right chest tube  remains. The heart is enlarged.  Sternotomy wires, cardiac valve marker noted. Pulmonary vasculature is  unremarkable. Minimal  atelectasis or infiltrate at the right base. Right  apical pneumothorax appears slightly decreased, 5 mm, previously 7 mm.  Small right pleural effusion. No left pneumothorax. Subcutaneous  emphysema is seen at the right chest wall, similar to prior exam, to  extent partly included. No acute osseous process.     Impression: Removal of one chest tube. Small right apical pneumothorax measures  slightly smaller.     This report was finalized on 1/16/2022 7:01 AM by Dr. Michael Smith M.D.       Scheduled Medications  acetaminophen, 1,000 mg, Oral, TID  atorvastatin, 10 mg, Oral, Daily  docusate sodium, 100 mg, Oral, BID  lisinopril, 10 mg, Oral, Q24H  metoprolol succinate XL, 25 mg, Oral, Q24H  polyethylene glycol, 17 g, Oral, Daily  senna-docusate sodium, 2 tablet, Oral, Nightly    Infusions  heparin, 12 Units/kg/hr, Last Rate: 15 Units/kg/hr (01/16/22 0137)  hold, 1 each  lactated ringers, 9 mL/hr, Last Rate: 9 mL/hr (01/13/22 0553)    Diet  Diet Regular; Thin       Assessment/Plan     Active Hospital Problems    Diagnosis  POA   • **Hemothorax [J94.2]  Unknown   • Cardiac arrest (HCC) [I46.9]  Unknown   • Mechanical heart valve present [Z95.2]  Not Applicable   • Essential hypertension [I10]  Unknown   • Abdominal wall hematoma [S30.1XXA]  Yes      Resolved Hospital Problems   No resolved problems to display.       Hemothorax/status post PEA arreast  -Cardiothoracic surgery consulted  -Patient has underwent a thoracentesis with 500 mL off  -was doing doing well initially, and was restarted on heparin drip with warfarin bridge.  However had PEA arrest; brief.  Resuscitated successfully.-Head 1/10 s/p emergent Rt CT 1/10  S/p VATS 1/12  -Transferred back to the floor 01/14  -Currently on heparin drip, not on warfarin.  -Anterior chest tube removed 01/15  -Cardiothoracic surgery following.  -Hemoglobin stable around 11  -Plan to remove posterior chest tube 01/16 and to resume warfarin afterwards.    #Large  abdominal wall hematoma/hemothorax  -general Surgery consulted, no indications for interventions.    -Hematoma improving.     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  -Plan to remove posterior chest tube tomorrow, and resume warfarin afterwards.      #Essential hypertension  -Continue lisinopril, metoprolol  -blood pressure stable.    · DVT prophylaxis heparin gtt   · Full code.  · Discussed with patient       Taniya Nicholas MD  [facility]  01/16/22  13:25 EST

## 2022-01-16 NOTE — PLAN OF CARE
Goal Outcome Evaluation:  Plan of Care Reviewed With: patient, spouse        Progress: improving  Outcome Summary: Able to ambulate 500 feet with platform rolling walker on unit with assistance for equipment, CGA with no increased symptoms or LOB. Will continue to monitor as chest tube removed with goal to return to home with family for support once medically stable.    Patient was intermittently wearing a face mask during this therapy encounter. Therapist used appropriate personal protective equipment including eye protection, mask, and gloves.  Mask used was standard procedure mask. Appropriate PPE was worn during the entire therapy session. Hand hygiene was completed before and after therapy session. Patient is not in enhanced droplet precautions.

## 2022-01-16 NOTE — PROGRESS NOTES
Claudio Tomi   66 y.o.  male    LOS: 14 days   Patient Care Team:  Shayan Celeste MD as PCP - General (Family Medicine)      Subjective  ( pt of Dr Leyva)    No issues overnight, waiting to get chest tube out tomr    Medication Review:   Current Facility-Administered Medications:   •  acetaminophen (TYLENOL) tablet 1,000 mg, 1,000 mg, Oral, TID, Leyda Rodriguez, APRN, 1,000 mg at 01/16/22 0821  •  acetaminophen (TYLENOL) tablet 650 mg, 650 mg, Oral, Q4H PRN, Leyda Rodriguez APRN, 650 mg at 01/10/22 1640  •  atorvastatin (LIPITOR) tablet 10 mg, 10 mg, Oral, Daily, Leyda Rodriguez APRN, 10 mg at 01/16/22 0821  •  benzonatate (TESSALON) capsule 100 mg, 100 mg, Oral, TID PRN, Leyda Rodriguez APRN, 100 mg at 01/07/22 0933  •  bisacodyl (DULCOLAX) suppository 10 mg, 10 mg, Rectal, Daily PRN, Leyda Rodriguez APRN  •  docusate sodium (COLACE) capsule 100 mg, 100 mg, Oral, BID, Leyda Rodriguez APRN, 100 mg at 01/16/22 0821  •  heparin 42101 units/250 mL (100 units/mL) in 0.45 % NaCl infusion, 12 Units/kg/hr, Intravenous, Titrated, Colin Yun MD, Last Rate: 15.75 mL/hr at 01/16/22 0137, 15 Units/kg/hr at 01/16/22 0137  •  Hold medication, 1 each, Does not apply, Continuous PRN, Leyda Rodriguez APRN  •  HYDROcodone-acetaminophen (NORCO) 5-325 MG per tablet 1 tablet, 1 tablet, Oral, Q6H PRN, Leyda Rodriguez APRN  •  HYDROmorphone (DILAUDID) injection 0.5 mg, 0.5 mg, Intravenous, Q2H PRN, Leyda Rodriguez APRN, 0.5 mg at 01/12/22 1645  •  ketamine hcl syringe solution prefilled syringe 10 mg, 10 mg, Intravenous, Q15 Min PRN, Leyda Rodriguez APRN  •  lactated ringers infusion, 9 mL/hr, Intravenous, Continuous, Leyda Rodriguez APRN, Last Rate: 9 mL/hr at 01/13/22 0553, 9 mL/hr at 01/13/22 0553  •  lisinopril (PRINIVIL,ZESTRIL) tablet 10 mg, 10 mg, Oral, Q24H, Colin Yun MD, 10 mg at 01/16/22 0821  •  magnesium hydroxide (MILK OF MAGNESIA) suspension 10 mL, 10 mL, Oral, Daily PRN, Leyda Rodriguez  DWAYNE ROCK  •  melatonin tablet 3 mg, 3 mg, Oral, Nightly PRN, Leyda Rodriguez APRN, 3 mg at 01/10/22 0120  •  metoprolol succinate XL (TOPROL-XL) 24 hr tablet 25 mg, 25 mg, Oral, Q24H, Colin Yun MD, 25 mg at 01/16/22 0821  •  morphine injection 2 mg, 2 mg, Intravenous, Q4H PRN, Leyda Rodriguez APRN, 2 mg at 01/11/22 2317  •  nitroglycerin (NITROSTAT) SL tablet 0.4 mg, 0.4 mg, Sublingual, Q5 Min PRN, Leyda Rodriguez APRN  •  ondansetron (ZOFRAN) tablet 4 mg, 4 mg, Oral, Q6H PRN **OR** ondansetron (ZOFRAN) injection 4 mg, 4 mg, Intravenous, Q6H PRN, Leyda Rodriguez APRN  •  oxyCODONE (ROXICODONE) immediate release tablet 5 mg, 5 mg, Oral, Q4H PRN, Leyda Rodriguez APRN  •  polyethylene glycol (MIRALAX) packet 17 g, 17 g, Oral, Daily, Leyda Rodriguez APRN, 17 g at 01/15/22 0808  •  sennosides-docusate (PERICOLACE) 8.6-50 MG per tablet 2 tablet, 2 tablet, Oral, Nightly, Leyda Rodriguez APRN, 2 tablet at 01/14/22 2045  •  [COMPLETED] Insert peripheral IV, , , Once **AND** sodium chloride 0.9 % flush 10 mL, 10 mL, Intravenous, PRN, Leyda Rodriguez APRN      Objective     Vital Sign Min/Max for last 24 hours  Temp  Min: 97.9 °F (36.6 °C)  Max: 98.7 °F (37.1 °C)   BP  Min: 118/68  Max: 134/74    Pulse  Min: 65  Max: 78     Wt Readings from Last 3 Encounters:   01/16/22 99.9 kg (220 lb 3.8 oz)        Intake/Output Summary (Last 24 hours) at 1/16/2022 0928  Last data filed at 1/16/2022 0600  Gross per 24 hour   Intake --   Output 820 ml   Net -820 ml     Physical Exam:   gen awake nad  Chest cta , chest tube noted  cvs regular mechanical valve click noted  abdo soft  Ext no edema     Monitor:  nsr  Results Review:     I reviewed the patient's new clinical results.    Sodium Sodium   Date Value Ref Range Status   01/16/2022 138 136 - 145 mmol/L Final   01/15/2022 137 136 - 145 mmol/L Final   01/14/2022 134 (L) 136 - 145 mmol/L Final      Potassium Potassium   Date Value Ref Range Status   01/16/2022 5.0 3.5 -  5.2 mmol/L Final   01/15/2022 4.4 3.5 - 5.2 mmol/L Final   01/14/2022 4.0 3.5 - 5.2 mmol/L Final      Chloride Chloride   Date Value Ref Range Status   01/16/2022 103 98 - 107 mmol/L Final   01/15/2022 105 98 - 107 mmol/L Final   01/14/2022 100 98 - 107 mmol/L Final      Bicarbonate No results found for: PLASMABICARB   BUN BUN   Date Value Ref Range Status   01/16/2022 10 8 - 23 mg/dL Final   01/15/2022 11 8 - 23 mg/dL Final   01/14/2022 14 8 - 23 mg/dL Final      Creatinine Creatinine   Date Value Ref Range Status   01/16/2022 0.67 (L) 0.76 - 1.27 mg/dL Final   01/15/2022 0.61 (L) 0.76 - 1.27 mg/dL Final   01/14/2022 0.67 (L) 0.76 - 1.27 mg/dL Final      Calcium Calcium   Date Value Ref Range Status   01/16/2022 8.7 8.6 - 10.5 mg/dL Final   01/15/2022 8.3 (L) 8.6 - 10.5 mg/dL Final   01/14/2022 8.0 (L) 8.6 - 10.5 mg/dL Final      Magnesium No results found for: MG     Results from last 7 days   Lab Units 01/16/22  0328   WBC 10*3/mm3 6.15   HEMOGLOBIN g/dL 11.4*   HEMATOCRIT % 35.9*   PLATELETS 10*3/mm3 254     Lab Results   Lab Value Date/Time    TROPONINT <0.010 01/10/2022 0257     No results found for: CHOL  No results found for: HDL  No results found for: LDL  No results found for: TRIG  No components found for: CHOLHDL    Results from last 7 days   Lab Units 01/16/22  0328 01/15/22  0106 01/14/22  1126   INR  1.28* 1.34* 1.54*         Echo EF Estimated  No results found for: ECHOEFEST       Assessment/ Plan  1. Hemothorax;  -Came in with spontaneous since heavy coughing and on anticoagulation at home, had pleural effusion on admit  -underwent thoracentesis 1/3 500 cc bloody fluid drained  -Code blue on 1/9/2022.bradycardiac arrest: CPR done, worsening hemothorax hence had Bronchoscopy, right video-assisted thoracoscopy with complete decortication and evacuation of hemothorax on 1/12/2022    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 HTN      Overnight stable 1 chest tube out, 1 remaining to come out Monday he says  bp  Stable 130s  Tele sinus no rhythm issues  Labs stable hb and renal function  Back on heparin gtt, needs to transition to coumadin when no more procedures pending,and chest tube out  would avoid bridging (AVR low risk for thrombosis, and with recent bleeding issues), when surgery team ok's will just start coumadin, continue heparin until then.      Griselda Betancourt MD  01/16/22  09:28 EST

## 2022-01-16 NOTE — PLAN OF CARE
Goal Outcome Evaluation:  Plan of Care Reviewed With: patient        Progress: improving  Outcome Summary: Patient alert, oriented x 4, pleasant; VSS. Patient has had a good day; ambulated 12 laps and has been in chair approximately 5 hours today. Using IS independently. Right chest tube remains to waterseal; dressing changed. Heparin drip therapeutic. Will continue supportive care.

## 2022-01-17 ENCOUNTER — APPOINTMENT (OUTPATIENT)
Dept: GENERAL RADIOLOGY | Facility: HOSPITAL | Age: 67
End: 2022-01-17

## 2022-01-17 LAB
ANION GAP SERPL CALCULATED.3IONS-SCNC: 9.1 MMOL/L (ref 5–15)
APTT PPP: 67 SECONDS (ref 22.7–35.4)
BASOPHILS # BLD AUTO: 0.05 10*3/MM3 (ref 0–0.2)
BASOPHILS NFR BLD AUTO: 0.9 % (ref 0–1.5)
BUN SERPL-MCNC: 11 MG/DL (ref 8–23)
BUN/CREAT SERPL: 17.7 (ref 7–25)
CALCIUM SPEC-SCNC: 8.4 MG/DL (ref 8.6–10.5)
CHLORIDE SERPL-SCNC: 100 MMOL/L (ref 98–107)
CO2 SERPL-SCNC: 25.9 MMOL/L (ref 22–29)
CREAT SERPL-MCNC: 0.62 MG/DL (ref 0.76–1.27)
DEPRECATED RDW RBC AUTO: 45 FL (ref 37–54)
EOSINOPHIL # BLD AUTO: 0.55 10*3/MM3 (ref 0–0.4)
EOSINOPHIL NFR BLD AUTO: 9.5 % (ref 0.3–6.2)
ERYTHROCYTE [DISTWIDTH] IN BLOOD BY AUTOMATED COUNT: 13.1 % (ref 12.3–15.4)
GFR SERPL CREATININE-BSD FRML MDRD: 130 ML/MIN/1.73
GLUCOSE SERPL-MCNC: 106 MG/DL (ref 65–99)
HCT VFR BLD AUTO: 36.2 % (ref 37.5–51)
HCT VFR BLD AUTO: 36.2 % (ref 37.5–51)
HGB BLD-MCNC: 11.6 G/DL (ref 13–17.7)
HGB BLD-MCNC: 11.9 G/DL (ref 13–17.7)
IMM GRANULOCYTES # BLD AUTO: 0.05 10*3/MM3 (ref 0–0.05)
IMM GRANULOCYTES NFR BLD AUTO: 0.9 % (ref 0–0.5)
INR PPP: 1.22 (ref 0.9–1.1)
LYMPHOCYTES # BLD AUTO: 0.98 10*3/MM3 (ref 0.7–3.1)
LYMPHOCYTES NFR BLD AUTO: 16.9 % (ref 19.6–45.3)
MCH RBC QN AUTO: 30.3 PG (ref 26.6–33)
MCHC RBC AUTO-ENTMCNC: 32 G/DL (ref 31.5–35.7)
MCV RBC AUTO: 94.5 FL (ref 79–97)
MONOCYTES # BLD AUTO: 0.47 10*3/MM3 (ref 0.1–0.9)
MONOCYTES NFR BLD AUTO: 8.1 % (ref 5–12)
NEUTROPHILS NFR BLD AUTO: 3.7 10*3/MM3 (ref 1.7–7)
NEUTROPHILS NFR BLD AUTO: 63.7 % (ref 42.7–76)
NRBC BLD AUTO-RTO: 0 /100 WBC (ref 0–0.2)
PLATELET # BLD AUTO: 291 10*3/MM3 (ref 140–450)
PMV BLD AUTO: 9.2 FL (ref 6–12)
POTASSIUM SERPL-SCNC: 4.3 MMOL/L (ref 3.5–5.2)
PROTHROMBIN TIME: 15.2 SECONDS (ref 11.7–14.2)
RBC # BLD AUTO: 3.83 10*6/MM3 (ref 4.14–5.8)
SODIUM SERPL-SCNC: 135 MMOL/L (ref 136–145)
WBC NRBC COR # BLD: 5.8 10*3/MM3 (ref 3.4–10.8)

## 2022-01-17 PROCEDURE — 85025 COMPLETE CBC W/AUTO DIFF WBC: CPT | Performed by: INTERNAL MEDICINE

## 2022-01-17 PROCEDURE — 99024 POSTOP FOLLOW-UP VISIT: CPT | Performed by: NURSE PRACTITIONER

## 2022-01-17 PROCEDURE — 80048 BASIC METABOLIC PNL TOTAL CA: CPT | Performed by: NURSE PRACTITIONER

## 2022-01-17 PROCEDURE — 85018 HEMOGLOBIN: CPT | Performed by: STUDENT IN AN ORGANIZED HEALTH CARE EDUCATION/TRAINING PROGRAM

## 2022-01-17 PROCEDURE — 71045 X-RAY EXAM CHEST 1 VIEW: CPT

## 2022-01-17 PROCEDURE — 85014 HEMATOCRIT: CPT | Performed by: STUDENT IN AN ORGANIZED HEALTH CARE EDUCATION/TRAINING PROGRAM

## 2022-01-17 PROCEDURE — 85730 THROMBOPLASTIN TIME PARTIAL: CPT | Performed by: INTERNAL MEDICINE

## 2022-01-17 PROCEDURE — 97530 THERAPEUTIC ACTIVITIES: CPT

## 2022-01-17 PROCEDURE — 85610 PROTHROMBIN TIME: CPT | Performed by: NURSE PRACTITIONER

## 2022-01-17 PROCEDURE — 36415 COLL VENOUS BLD VENIPUNCTURE: CPT | Performed by: INTERNAL MEDICINE

## 2022-01-17 PROCEDURE — 25010000002 HEPARIN (PORCINE) 25000-0.45 UT/250ML-% SOLUTION: Performed by: INTERNAL MEDICINE

## 2022-01-17 RX ADMIN — HEPARIN SODIUM 15 UNITS/KG/HR: 10000 INJECTION, SOLUTION INTRAVENOUS at 23:30

## 2022-01-17 RX ADMIN — HEPARIN SODIUM 15 UNITS/KG/HR: 10000 INJECTION, SOLUTION INTRAVENOUS at 07:28

## 2022-01-17 RX ADMIN — METOPROLOL SUCCINATE 25 MG: 25 TABLET, EXTENDED RELEASE ORAL at 08:09

## 2022-01-17 RX ADMIN — ATORVASTATIN CALCIUM 10 MG: 20 TABLET, FILM COATED ORAL at 08:10

## 2022-01-17 RX ADMIN — ACETAMINOPHEN 1000 MG: 500 TABLET, FILM COATED ORAL at 15:21

## 2022-01-17 RX ADMIN — DOCUSATE SODIUM 100 MG: 100 CAPSULE, LIQUID FILLED ORAL at 21:20

## 2022-01-17 RX ADMIN — DOCUSATE SODIUM 100 MG: 100 CAPSULE, LIQUID FILLED ORAL at 08:10

## 2022-01-17 RX ADMIN — ACETAMINOPHEN 1000 MG: 500 TABLET, FILM COATED ORAL at 08:10

## 2022-01-17 RX ADMIN — LISINOPRIL 10 MG: 10 TABLET ORAL at 08:09

## 2022-01-17 RX ADMIN — ACETAMINOPHEN 1000 MG: 500 TABLET, FILM COATED ORAL at 21:20

## 2022-01-17 NOTE — PLAN OF CARE
Goal Outcome Evaluation:  Plan of Care Reviewed With: patient        Progress: improving  Outcome Summary: patient vss. no pain reported. chest tube removed today by NP. on heparin drip. hopefully home soon. will monitor.

## 2022-01-17 NOTE — PROGRESS NOTES
"  POST-OPERATIVE NOTE     Chief Complaint: Hemothorax  S/P: Bronchoscopy, right video-assisted thoracoscopy with complete decortication and evacuation of hemothorax  POD # 4    Subjective:  Symptoms:  Stable.  No shortness of breath or chest pain.    Diet:  Adequate intake.  No nausea or vomiting.    Activity level: Returning to normal.    Pain:  He complains of pain that is mild.  Pain is well controlled.    Up To chair.  Pain well controlled.  No new complaints.    Objective:  General Appearance:  Comfortable, in no acute distress and well-appearing.    Vital signs: (most recent): Blood pressure 135/74, pulse 66, temperature 98.4 °F (36.9 °C), temperature source Oral, resp. rate 16, height 177.8 cm (70\"), weight 99.5 kg (219 lb 5.7 oz), SpO2 94 %.  Vital signs are normal.  No fever.    Output: Producing urine.    Lungs:  Normal effort and normal respiratory rate.  He is not in respiratory distress.  There are decreased breath sounds.  No wheezes or rhonchi.    Heart: Normal rate.  Regular rhythm.    Chest: Chest wall tenderness (Incisional) present.    Abdomen: Abdomen is soft.  (Ecchymosis to right flank).  There is no abdominal tenderness.     Pulses: Distal pulses are intact.    Neurological: Patient is alert and oriented to person, place and time.    Skin:  Warm and dry.  There is ecchymosis.              Chest tube:   Site: Right, Clean, Dry, Intact and Securement device intact  Suction: waterseal  Air Leak: negative  24 Hour Total: 170cc    Results Review:     I reviewed the patient's new clinical results.  I reviewed the patient's new imaging results and agree with the interpretation.  I reviewed the patient's other test results and agree with the interpretation.     Assessment/Plan     Mr. Krishna is a pleasant 66-year-old gentleman with a hemothorax status post VATS decortication.  He has done very well postoperatively.  Today's chest x-ray is stable without evidence of pneumothorax.  Final chest tube was " removed at the bedside today without difficulty.  Currently on heparin drip per cardiology for history of mechanical valve.  May resume oral anticoagulation at any time from our standpoint.    We will check a final chest x-ray in the morning and patient should be stable for discharge from our standpoint.  Continue pulmonary care and ambulation.    Rafaela Stout DNP, APRN  [facility]  01/17/22  11:47 EST    Patient was seen and assessed while wearing personal protective equipment including facemask, protective eyewear and gloves.  Hand hygiene performed prior to entering the room and upon exiting with doffing of gloves.

## 2022-01-17 NOTE — NURSING NOTE
"At approximately 0400 pt called out stating \"My tube came loose.\" This RN entered the room to find the chest tube disconnected from atrium. No air leak present once tube was reconnected. Patient denied shortness of air or chest discomfort. Vital signs remained stable.   "

## 2022-01-17 NOTE — THERAPY TREATMENT NOTE
Patient Name: Claudio Krishna  : 1955    MRN: [id number]                              Today's Date: 2022       Admit Date: 2022    Visit Dx:     ICD-10-CM ICD-9-CM   1. Abdominal wall hematoma, initial encounter  S30.1XXA 922.2   2. Hemothorax on right  J94.2 511.89   3. Chronic anticoagulation  Z79.01 V58.61   4. Mechanical heart valve present  Z95.2 V43.3   5. Hemothorax  J94.2 511.89     Patient Active Problem List   Diagnosis   • Abdominal wall hematoma   • Coronary artery disease   • Mechanical heart valve present   • Hemothorax   • Essential hypertension   • Cardiac arrest (HCC)     Past Medical History:   Diagnosis Date   • Coronary artery disease    • Hypertension      Past Surgical History:   Procedure Laterality Date   • AORTA SURGERY  2011   • HERNIA REPAIR     • KNEE MINI REVISION     • THORACOSCOPY WITH DECORTICATION Right 2022    Procedure: BRONCHOSCOPY, RIGHT THORACOSCOPY VIDEO ASSISTED WITH COMPLETE DECORTICATION;  Surgeon: Sol Andrews MD;  Location: [facility];  Service: Thoracic;  Laterality: Right;      General Information     Row Name 22 1409          Physical Therapy Time and Intention    Document Type therapy note (daily note)  -CB     Mode of Treatment individual therapy; physical therapy  -CB     Row Name 22 1409          General Information    Patient Profile Reviewed yes  -CB     Existing Precautions/Restrictions fall  -CB     Row Name 22 1409          Cognition    Orientation Status (Cognition) oriented x 4  -CB     Row Name 22 1409          Safety Issues, Functional Mobility    Impairments Affecting Function (Mobility) endurance/activity tolerance  -CB     Comment, Safety Issues/Impairments (Mobility) gait belt and non skid socks  -CB           User Key  (r) = Recorded By, (t) = Taken By, (c) = Cosigned By    Initials Name Provider Type    CB Leti Guo PT Physical Therapist               Mobility     Row Name 22  1410          Bed Mobility    Bed Mobility sit-supine  -CB     Sit-Supine Emblem (Bed Mobility) standby assist  -CB     Row Name 01/17/22 1410          Sit-Stand Transfer    Sit-Stand Emblem (Transfers) standby assist  -CB     Assistive Device (Sit-Stand Transfers) --  no AD  -CB     Row Name 01/17/22 1410          Gait/Stairs (Locomotion)    Emblem Level (Gait) contact guard; standby assist; verbal cues  -CB     Assistive Device (Gait) --  no AD  -CB     Distance in Feet (Gait) 250ft  -CB     Deviations/Abnormal Patterns (Gait) maida decreased; gait speed decreased; stride length decreased  -CB     Comment (Gait/Stairs) no LOB and minimal unsteadiness at times.  -CB           User Key  (r) = Recorded By, (t) = Taken By, (c) = Cosigned By    Initials Name Provider Type    Leti Chavez PT Physical Therapist               Obj/Interventions     Row Name 01/17/22 1411          Balance    Balance Assessment standing static balance; standing dynamic balance  -CB     Static Standing Balance WFL; unsupported; standing  -CB     Dynamic Standing Balance mild impairment; unsupported; standing  -CB     Balance Interventions standing; sitting; sit to stand; supported; static; dynamic; minimal challenge  -CB           User Key  (r) = Recorded By, (t) = Taken By, (c) = Cosigned By    Initials Name Provider Type    Leti Chavez PT Physical Therapist               Goals/Plan    No documentation.                Clinical Impression     Row Name 01/17/22 1411          Pain    Additional Documentation Pain Scale: Numbers Pre/Post-Treatment (Group)  -CB     Row Name 01/17/22 1411          Pain Scale: Numbers Pre/Post-Treatment    Pretreatment Pain Rating 2/10  -CB     Posttreatment Pain Rating 2/10  -CB     Pre/Posttreatment Pain Comment chest tube removal site  -CB     Pain Intervention(s) Repositioned; Rest; Ambulation/increased activity  -CB     Row Name 01/17/22 1411          Plan of Care Review     Plan of Care Reviewed With patient  -CB     Progress improving  -CB     Outcome Summary Patient is agreeable to PT and all chest tubes are now removed. Pt ambulated 250ft without use of AD requiring SBA/CGA. No overt LOB and minimal unsteadiness at times. Pt completed STS with SBA and bed mobility with SBA. Pt will continue to benefit from skilled PT.  -CB     Row Name 01/17/22 1411          Vital Signs    Recovery Time all VSS  -CB     Row Name 01/17/22 1411          Positioning and Restraints    Pre-Treatment Position sitting in chair/recliner  -CB     Post Treatment Position bed  -CB     In Bed notified nsg; fowlers; call light within reach; encouraged to call for assist; exit alarm on; side rails up x2; with family/caregiver  -CB           User Key  (r) = Recorded By, (t) = Taken By, (c) = Cosigned By    Initials Name Provider Type    Leti Chavez PT Physical Therapist               Outcome Measures     Row Name 01/17/22 1413          How much help from another person do you currently need...    Turning from your back to your side while in flat bed without using bedrails? 4  -CB     Moving from lying on back to sitting on the side of a flat bed without bedrails? 3  -CB     Moving to and from a bed to a chair (including a wheelchair)? 3  -CB     Standing up from a chair using your arms (e.g., wheelchair, bedside chair)? 3  -CB     Climbing 3-5 steps with a railing? 3  -CB     To walk in hospital room? 3  -CB     AM-PAC 6 Clicks Score (PT) 19  -CB     Row Name 01/17/22 1413          Functional Assessment    Outcome Measure Options AM-PAC 6 Clicks Basic Mobility (PT)  -CB           User Key  (r) = Recorded By, (t) = Taken By, (c) = Cosigned By    Initials Name Provider Type    Leti Chavez PT Physical Therapist                             Physical Therapy Education                 Title: PT OT SLP Therapies (Done)     Topic: Physical Therapy (Done)     Point: Mobility training (Done)     Learning  Progress Summary           Patient Acceptance, E,TB,D, VU,NR by CB at 1/17/2022 1413    Acceptance, E,TB,D, VU,DU by SC at 1/16/2022 1443    Acceptance, E, NR by AR at 1/14/2022 1654    Acceptance, E,TB,D, VU,NR by  at 1/13/2022 1650   Family Acceptance, E,TB,D, VU,DU by SC at 1/16/2022 1443                   Point: Home exercise program (Done)     Learning Progress Summary           Patient Acceptance, E,TB,D, VU,DU by SC at 1/16/2022 1443    Acceptance, E, NR by AR at 1/14/2022 1654   Family Acceptance, E,TB,D, VU,DU by SC at 1/16/2022 1443                   Point: Body mechanics (Done)     Learning Progress Summary           Patient Acceptance, E,TB,D, VU,NR by CB at 1/17/2022 1413    Acceptance, E,TB,D, VU,DU by SC at 1/16/2022 1443    Acceptance, E, NR by AR at 1/14/2022 1654    Acceptance, E,TB,D, VU,NR by  at 1/13/2022 1650   Family Acceptance, E,TB,D, VU,DU by SC at 1/16/2022 1443                   Point: Precautions (Done)     Learning Progress Summary           Patient Acceptance, E,TB,D, VU,NR by  at 1/17/2022 1413    Acceptance, E,TB,D, VU,DU by SC at 1/16/2022 1443    Acceptance, E, NR by AR at 1/14/2022 1654    Acceptance, E,TB,D, VU,NR by  at 1/13/2022 1650   Family Acceptance, E,TB,D, VU,DU by SC at 1/16/2022 1443                               User Key     Initials Effective Dates Name Provider Type Discipline    SC 06/16/21 -  Leyda Ames, PT Physical Therapist PT    CH 06/16/21 -  Sherley Bob, PT Physical Therapist PT    AR 06/16/21 -  tSephanie Thompson, PT Physical Therapist PT    CB 10/22/21 -  Leti Guo, PT Physical Therapist PT              PT Recommendation and Plan     Plan of Care Reviewed With: patient  Progress: improving  Outcome Summary: Patient is agreeable to PT and all chest tubes are now removed. Pt ambulated 250ft without use of AD requiring SBA/CGA. No overt LOB and minimal unsteadiness at times. Pt completed STS with SBA and bed mobility with SBA. Pt  will continue to benefit from skilled PT.     Time Calculation:    PT Charges     Row Name 01/17/22 1414             Time Calculation    Start Time 1326  -CB      Stop Time 1339  -CB      Time Calculation (min) 13 min  -CB      PT Received On 01/17/22  -CB      PT - Next Appointment 01/18/22  -CB              Time Calculation- PT    Total Timed Code Minutes- PT 13 minute(s)  -CB              Timed Charges    98530 - PT Therapeutic Activity Minutes 13  -CB              Total Minutes    Timed Charges Total Minutes 13  -CB       Total Minutes 13  -CB            User Key  (r) = Recorded By, (t) = Taken By, (c) = Cosigned By    Initials Name Provider Type    CB Leti Guo, PT Physical Therapist              Therapy Charges for Today     Code Description Service Date Service Provider Modifiers Qty    93928803108 HC PT THERAPEUTIC ACT EA 15 MIN 1/17/2022 Leti Guo PT GP 1          PT G-Codes  Outcome Measure Options: AM-PAC 6 Clicks Basic Mobility (PT)  AM-PAC 6 Clicks Score (PT): 19    Leti Guo PT  1/17/2022

## 2022-01-17 NOTE — PROGRESS NOTES
Claudio Tomi   66 y.o.  male    LOS: 15 days   Patient Care Team:  Shayan Celeste MD as PCP - General (Family Medicine)      Subjective     Interval History:     Patient Complaints:  Omplaints, expects ct to come out today    Review of Systems:       Medication Review:   Current Facility-Administered Medications:   •  acetaminophen (TYLENOL) tablet 1,000 mg, 1,000 mg, Oral, TID, Leyda Rodriguez, APRN, 1,000 mg at 01/17/22 0810  •  acetaminophen (TYLENOL) tablet 650 mg, 650 mg, Oral, Q4H PRN, Leyda Rodriguez APRN, 650 mg at 01/10/22 1640  •  atorvastatin (LIPITOR) tablet 10 mg, 10 mg, Oral, Daily, Leyda Rodriguez APRN, 10 mg at 01/17/22 0810  •  benzonatate (TESSALON) capsule 100 mg, 100 mg, Oral, TID PRN, Leyda Rodriguez APRN, 100 mg at 01/07/22 0933  •  bisacodyl (DULCOLAX) suppository 10 mg, 10 mg, Rectal, Daily PRN, Leyda Rodriguez APRN  •  docusate sodium (COLACE) capsule 100 mg, 100 mg, Oral, BID, Leyda Rodriguez APRN, 100 mg at 01/17/22 0810  •  heparin 65904 units/250 mL (100 units/mL) in 0.45 % NaCl infusion, 12 Units/kg/hr, Intravenous, Titrated, Colin Yun MD, Last Rate: 15.75 mL/hr at 01/17/22 0728, 15 Units/kg/hr at 01/17/22 0728  •  Hold medication, 1 each, Does not apply, Continuous PRN, Leyda Rodriguez APRN  •  HYDROcodone-acetaminophen (NORCO) 5-325 MG per tablet 1 tablet, 1 tablet, Oral, Q6H PRN, Leyda Rodriguez APRN  •  HYDROmorphone (DILAUDID) injection 0.5 mg, 0.5 mg, Intravenous, Q2H PRN, Leyda Rodriguez APRN, 0.5 mg at 01/12/22 1645  •  ketamine hcl syringe solution prefilled syringe 10 mg, 10 mg, Intravenous, Q15 Min PRN, Leyda Rodriguez APRN  •  lactated ringers infusion, 9 mL/hr, Intravenous, Continuous, Leyda Rodriguez APRN, Last Rate: 9 mL/hr at 01/13/22 0553, 9 mL/hr at 01/13/22 0553  •  lisinopril (PRINIVIL,ZESTRIL) tablet 10 mg, 10 mg, Oral, Q24H, Colin Yun MD, 10 mg at 01/17/22 0809  •  magnesium hydroxide (MILK OF MAGNESIA) suspension 10 mL, 10  mL, Oral, Daily PRN, Leyda Rodriguez APRN  •  melatonin tablet 3 mg, 3 mg, Oral, Nightly PRN, Leyda Rodriguez APRN, 3 mg at 01/10/22 0120  •  metoprolol succinate XL (TOPROL-XL) 24 hr tablet 25 mg, 25 mg, Oral, Q24H, Colin Yun MD, 25 mg at 01/17/22 0809  •  morphine injection 2 mg, 2 mg, Intravenous, Q4H PRN, Leyda Rodriguez APRN, 2 mg at 01/11/22 2317  •  nitroglycerin (NITROSTAT) SL tablet 0.4 mg, 0.4 mg, Sublingual, Q5 Min PRN, Leyda Rodriguez APRN  •  ondansetron (ZOFRAN) tablet 4 mg, 4 mg, Oral, Q6H PRN **OR** ondansetron (ZOFRAN) injection 4 mg, 4 mg, Intravenous, Q6H PRN, Leyda Rodriguez APRN  •  oxyCODONE (ROXICODONE) immediate release tablet 5 mg, 5 mg, Oral, Q4H PRN, Leyda Rodriguez APRN  •  polyethylene glycol (MIRALAX) packet 17 g, 17 g, Oral, Daily, Leyda Rodriguez APRN, 17 g at 01/15/22 0808  •  sennosides-docusate (PERICOLACE) 8.6-50 MG per tablet 2 tablet, 2 tablet, Oral, Nightly, Leyda Rodriguez APRN, 2 tablet at 01/14/22 2045  •  [COMPLETED] Insert peripheral IV, , , Once **AND** sodium chloride 0.9 % flush 10 mL, 10 mL, Intravenous, PRN, Leyda Rodriguez APRN      Objective   Vital Sign Min/Max for last 24 hours  Temp  Min: 98 °F (36.7 °C)  Max: 98.4 °F (36.9 °C)   BP  Min: 119/69  Max: 135/74    Pulse  Min: 64  Max: 89     Wt Readings from Last 3 Encounters:   01/17/22 99.5 kg (219 lb 5.7 oz)        Intake/Output Summary (Last 24 hours) at 1/17/2022 0940  Last data filed at 1/17/2022 0600  Gross per 24 hour   Intake --   Output 1820 ml   Net -1820 ml     Physical Exam:      General Appearance:    Well developed and well nourished wm sitting up in chair in no acute distress   Head:    Normocephalic, atraumatic   Eyes:            Conjunctivae normal, anicteric, no xanthelasma   Neck:   supple, trachea midline, no thyromegaly, no carotid bruit, no JVD, no elevated CVP   Lungs:     Clear to auscultation,respirations regular, even and                  unlabored    Heart:     Regular rhythm and normal rate, normal S1 and S2,            No murmur, no gallop, no rub, no click   Chest Wall:    No abnormalities observed, ct to pleurovac   Abdomen:     Normal bowel sounds, no masses, no organomegaly, soft        nontender, nondistended, no guarding, no rebound                tenderness   Rectal:     Deferred   Extremities:   No edema. Moves all extremities well, no cyanosis, no erythema   Pulses:   Pulses palpable and equal bilaterally   Skin:   No bleeding, bruising or rash   Neurologic:   awake alert and oriented x3, speech clear and approp, no facial drooping     : voids   Monitor:  nsr    Results Review:         Sodium Sodium   Date Value Ref Range Status   01/17/2022 135 (L) 136 - 145 mmol/L Final   01/16/2022 138 136 - 145 mmol/L Final   01/15/2022 137 136 - 145 mmol/L Final      Potassium Potassium   Date Value Ref Range Status   01/17/2022 4.3 3.5 - 5.2 mmol/L Final     Comment:     Slight hemolysis detected by analyzer. Results may be affected.   01/16/2022 5.0 3.5 - 5.2 mmol/L Final   01/15/2022 4.4 3.5 - 5.2 mmol/L Final      Chloride Chloride   Date Value Ref Range Status   01/17/2022 100 98 - 107 mmol/L Final   01/16/2022 103 98 - 107 mmol/L Final   01/15/2022 105 98 - 107 mmol/L Final      Bicarbonate No results found for: PLASMABICARB   BUN BUN   Date Value Ref Range Status   01/17/2022 11 8 - 23 mg/dL Final   01/16/2022 10 8 - 23 mg/dL Final   01/15/2022 11 8 - 23 mg/dL Final      Creatinine Creatinine   Date Value Ref Range Status   01/17/2022 0.62 (L) 0.76 - 1.27 mg/dL Final   01/16/2022 0.67 (L) 0.76 - 1.27 mg/dL Final   01/15/2022 0.61 (L) 0.76 - 1.27 mg/dL Final      Calcium Calcium   Date Value Ref Range Status   01/17/2022 8.4 (L) 8.6 - 10.5 mg/dL Final   01/16/2022 8.7 8.6 - 10.5 mg/dL Final   01/15/2022 8.3 (L) 8.6 - 10.5 mg/dL Final      Magnesium No results found for: MG     Results from last 7 days   Lab Units 01/17/22  0439   WBC 10*3/mm3 5.80   HEMOGLOBIN g/dL  11.6*   HEMATOCRIT % 36.2*   PLATELETS 10*3/mm3 291     Lab Results   Lab Value Date/Time    TROPONINT <0.010 01/10/2022 0257            Echo EF Estimated  No results found for: ECHOEFEST      Assessment/ Plan  Assessment/Plan   Active Hospital Problems    Diagnosis  POA   • **Hemothorax [J94.2]  Unknown     Priority: Low   • Cardiac arrest (HCC) [I46.9]  Unknown     Priority: Low   • Mechanical heart valve present [Z95.2]  Not Applicable     Priority: Low   • Essential hypertension [I10]  Unknown     Priority: Low   • Abdominal wall hematoma [S30.1XXA]  Yes     Priority: Low     1. Hemothorax;  -spontaneous since heavy coughing and on anticoagulation at home, had pleural effusion on admit  -thoracentesis 1/3 500 cc bloody fluid drained  -Code blue on 1/9/2022.bradycardiac arrest: CPR done, worsening hemothorax hence had Bronchoscopy, right video-assisted thoracoscopy with complete decortication and evacuation of hemothorax on 1/12/2022     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 HTN       on heparin gtt, transition to coumadin when no more procedures planned,  chest tube likely out today  would avoid bridging (AVR low risk for thrombosis, and with recent bleeding issues), when surgery team ok's will just start coumadin, continue heparin until then.    Celi Henson, APRN  01/17/22  09:40 EST    Physician Evaluation  Reviewed our cardiology group Nurse Practitioner's note.    Pt interviewed and examined.   Findings verified.   Reviewed and agree with corrections or modifications of history, physical, and plans as indicated:     Hopefully tube will be able to be removed today.  Examination shows normal mechanical prosthetic aortic valve clicks, and typical left ear outflow murmur.  If tube removed, would add Coumadin today at standard dosing for the patient (5 mg Wednesday/Saturday; 7.5 mg  warfarin every other day).  Lower risk for thrombosis aortic valve replacement, sinus rhythm.  Would agree short-term heparin continuation after initiation of Coumadin.  If Coumadin initiated today, would stop the heparin tomorrow.  Patient has appointment for INR assessment on 1/26/2022.  Would keep this, and keep on prior dosing.      Rickie Posada MD    Please note that portions of this note were completed with a voice recognition program.

## 2022-01-17 NOTE — PLAN OF CARE
Goal Outcome Evaluation:  Plan of Care Reviewed With: patient        Progress: improving  Outcome Summary: VSS. Alert and oriented x4. Right chest tube to waterseal. No airleak present. Heparin gtt remains therapeutic. Urinal at bedside. Pt denies pain. Will continue to provide supportive care.

## 2022-01-17 NOTE — PLAN OF CARE
Goal Outcome Evaluation:  Plan of Care Reviewed With: patient        Progress: improving  Outcome Summary: Patient is agreeable to PT and all chest tubes are now removed. Pt ambulated 250ft without use of AD requiring SBA/CGA. No overt LOB and minimal unsteadiness at times. Pt completed STS with SBA and bed mobility with SBA. Pt will continue to benefit from skilled PT.

## 2022-01-17 NOTE — PROGRESS NOTES
Name: Claudio Krisnha ADMIT: 2022   : 1955  PCP: Shayan Celeste MD    MRN: [id number] LOS: 15 days   AGE/SEX: 66 y.o. male  ROOM: [facility]     Subjective   Subjective   Patient seen this morning.  No new events overnight.  States feels good.  Has been ambulating around.  Plan to remove posterior chest tube today per cardiothoracic surgery..   Review of Systems   As above  Objective   Objective   Vital Signs  Temp:  [98 °F (36.7 °C)-98.4 °F (36.9 °C)] 98.4 °F (36.9 °C)  Heart Rate:  [64-89] 66  Resp:  [16-17] 16  BP: (119-135)/(63-74) 135/74  SpO2:  [94 %-97 %] 94 %  on   ;   Device (Oxygen Therapy): room air  Body mass index is 31.47 kg/m².  Physical Exam  Constitutional:       Appearance: Normal appearance,  HENT:      Head: Normocephalic and atraumatic.   Cardiovascular:      Rate and Rhythm: Normal rate and regular rhythm.      Heart sounds Murmur heard.   Pulmonary:      Effort: Pulmonary effort is normal.      Breath sounds: Normal breath sounds, decreased breath sounds right lower quadrant.  Posterior chest tube in place.  Abdominal:      General: Bowel sounds are normal. There is no distension.      Palpations: Abdomen is soft.      Tenderness: There is no abdominal tenderness.      Comments:  bruising to his abdomen, worse on right side and extends to his right flank (significantly improved compared to admission.)  Skin:     General: Skin is warm and dry.   Neurological:      Mental Status: He is alert and oriented to person, place, and time. Mental status is at baseline.   Psychiatric:         Mood and Affect: Mood normal.         Behavior: Behavior normal.      Results Review     I reviewed the patient's new clinical results.  Results from last 7 days   Lab Units 22  0439 22  1733 22  0328 01/15/22  0106 22  1809 22  1126   WBC 10*3/mm3 5.80  --  6.15 6.81  --  6.69   HEMOGLOBIN g/dL 11.6* 11.4* 11.4* 11.1*   < > 11.4*   PLATELETS 10*3/mm3 291  --  254 243  --   252    < > = values in this interval not displayed.     Results from last 7 days   Lab Units 01/17/22  0439 01/16/22 0328 01/15/22  0106 01/14/22  0337   SODIUM mmol/L 135* 138 137 134*   POTASSIUM mmol/L 4.3 5.0 4.4 4.0   CHLORIDE mmol/L 100 103 105 100   CO2 mmol/L 25.9 29.6* 27.0 26.8   BUN mg/dL 11 10 11 14   CREATININE mg/dL 0.62* 0.67* 0.61* 0.67*   GLUCOSE mg/dL 106* 116* 119* 110*   Estimated Creatinine Clearance: 107.4 mL/min (A) (by C-G formula based on SCr of 0.62 mg/dL (L)).      Results from last 7 days   Lab Units 01/17/22  0439 01/16/22  0328 01/15/22  0106 01/14/22  0337   CALCIUM mg/dL 8.4* 8.7 8.3* 8.0*         COVID19   Date Value Ref Range Status   01/11/2022 Not Detected Not Detected - Ref. Range Final   01/01/2022 Not Detected Not Detected - Ref. Range Final     Glucose   Date/Time Value Ref Range Status   01/16/2022 1507 109 70 - 130 mg/dL Final     Comment:     Meter: [id number] : [id number] Braulio Warren CLEMENTE   01/14/2022 1125 138 (H) 70 - 130 mg/dL Final     Comment:     Meter: [id number] : [id number] Rut Marti RN       XR Chest 1 View  XR CHEST 1 VW-     Clinical: Chest tube management     COMPARISON 1/16/2022     FINDINGS: Right apical pneumothorax similar to or slightly more  pronounced compared to the previous examination. Estimated size 5% or  less. There is a right-sided chest tube in stable position with  subcutaneous emphysema at the chest tube insertion site. There is  atelectasis demonstrated at the right lung base with perhaps a small  amount of pleural thickening or effusion again blunting the costophrenic  sulcus. Cardiomediastinal silhouette is stable. No vascular congestion  is demonstrated. The remainder is unremarkable.     This report was finalized on 1/17/2022 8:05 AM by Dr. Sunday Read M.D.       Scheduled Medications  acetaminophen, 1,000 mg, Oral, TID  atorvastatin, 10 mg, Oral, Daily  docusate sodium, 100 mg, Oral, BID  lisinopril, 10 mg, Oral,  Q24H  metoprolol succinate XL, 25 mg, Oral, Q24H  polyethylene glycol, 17 g, Oral, Daily  senna-docusate sodium, 2 tablet, Oral, Nightly    Infusions  heparin, 12 Units/kg/hr, Last Rate: 15 Units/kg/hr (01/17/22 0728)  hold, 1 each  lactated ringers, 9 mL/hr, Last Rate: 9 mL/hr (01/13/22 0553)    Diet  Diet Regular; Thin       Assessment/Plan     Active Hospital Problems    Diagnosis  POA   • **Hemothorax [J94.2]  Unknown   • Cardiac arrest (HCC) [I46.9]  Unknown   • Mechanical heart valve present [Z95.2]  Not Applicable   • Essential hypertension [I10]  Unknown   • Abdominal wall hematoma [S30.1XXA]  Yes      Resolved Hospital Problems   No resolved problems to display.       Hemothorax/status post PEA arreast  -Cardiothoracic surgery consulted  -Patient has underwent a thoracentesis with 500 mL off  -was doing doing well initially, and was restarted on heparin drip with warfarin bridge.  However had PEA arrest; brief.  Resuscitated successfully.-Head 1/10 s/p emergent Rt CT 1/10  S/p VATS 1/12  -Transferred back to the floor 01/14  -Currently on heparin drip, not on warfarin.  -Anterior chest tube removed 01/15  -Cardiothoracic surgery following.  -Hemoglobin stable around 11  -Plan to remove posterior chest tube 01/16 and to resume warfarin afterwards once okay per surgery.  No plan for bridging per cardiology in the setting of recent bleeding issues     #Large abdominal wall hematoma/hemothorax  -general Surgery consulted, no indications for interventions.    -Hematoma improving.     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  -Plan to remove posterior chest tube  01/17and resume warfarin afterwards once okay per cardiology.      #Essential hypertension  -Continue lisinopril, metoprolol  -blood pressure  stable.    · DVT prophylaxis heparin gtt   · Full code.  · Discussed with patient       Taniya Nicholas MD  [facility]  01/17/22  10:32 EST

## 2022-01-18 ENCOUNTER — APPOINTMENT (OUTPATIENT)
Dept: GENERAL RADIOLOGY | Facility: HOSPITAL | Age: 67
End: 2022-01-18

## 2022-01-18 LAB
ANION GAP SERPL CALCULATED.3IONS-SCNC: 9.8 MMOL/L (ref 5–15)
APTT PPP: 83.3 SECONDS (ref 22.7–35.4)
BASOPHILS # BLD AUTO: 0.04 10*3/MM3 (ref 0–0.2)
BASOPHILS NFR BLD AUTO: 0.6 % (ref 0–1.5)
BUN SERPL-MCNC: 10 MG/DL (ref 8–23)
BUN/CREAT SERPL: 15.6 (ref 7–25)
CALCIUM SPEC-SCNC: 8.8 MG/DL (ref 8.6–10.5)
CHLORIDE SERPL-SCNC: 100 MMOL/L (ref 98–107)
CO2 SERPL-SCNC: 26.2 MMOL/L (ref 22–29)
CREAT SERPL-MCNC: 0.64 MG/DL (ref 0.76–1.27)
DEPRECATED RDW RBC AUTO: 46.1 FL (ref 37–54)
EOSINOPHIL # BLD AUTO: 0.43 10*3/MM3 (ref 0–0.4)
EOSINOPHIL NFR BLD AUTO: 6.3 % (ref 0.3–6.2)
ERYTHROCYTE [DISTWIDTH] IN BLOOD BY AUTOMATED COUNT: 13.2 % (ref 12.3–15.4)
GFR SERPL CREATININE-BSD FRML MDRD: 125 ML/MIN/1.73
GLUCOSE SERPL-MCNC: 143 MG/DL (ref 65–99)
HCT VFR BLD AUTO: 32.1 % (ref 37.5–51)
HCT VFR BLD AUTO: 36.9 % (ref 37.5–51)
HGB BLD-MCNC: 10.7 G/DL (ref 13–17.7)
HGB BLD-MCNC: 11.7 G/DL (ref 13–17.7)
IMM GRANULOCYTES # BLD AUTO: 0.06 10*3/MM3 (ref 0–0.05)
IMM GRANULOCYTES NFR BLD AUTO: 0.9 % (ref 0–0.5)
INR PPP: 1.16 (ref 0.9–1.1)
INR PPP: 1.18 (ref 0.9–1.1)
LYMPHOCYTES # BLD AUTO: 0.75 10*3/MM3 (ref 0.7–3.1)
LYMPHOCYTES NFR BLD AUTO: 10.9 % (ref 19.6–45.3)
MCH RBC QN AUTO: 30 PG (ref 26.6–33)
MCHC RBC AUTO-ENTMCNC: 31.7 G/DL (ref 31.5–35.7)
MCV RBC AUTO: 94.6 FL (ref 79–97)
MONOCYTES # BLD AUTO: 0.56 10*3/MM3 (ref 0.1–0.9)
MONOCYTES NFR BLD AUTO: 8.2 % (ref 5–12)
NEUTROPHILS NFR BLD AUTO: 5.01 10*3/MM3 (ref 1.7–7)
NEUTROPHILS NFR BLD AUTO: 73.1 % (ref 42.7–76)
NRBC BLD AUTO-RTO: 0 /100 WBC (ref 0–0.2)
PLATELET # BLD AUTO: 285 10*3/MM3 (ref 140–450)
PMV BLD AUTO: 9.3 FL (ref 6–12)
POTASSIUM SERPL-SCNC: 4.1 MMOL/L (ref 3.5–5.2)
PROTHROMBIN TIME: 14.6 SECONDS (ref 11.7–14.2)
PROTHROMBIN TIME: 14.8 SECONDS (ref 11.7–14.2)
RBC # BLD AUTO: 3.9 10*6/MM3 (ref 4.14–5.8)
SODIUM SERPL-SCNC: 136 MMOL/L (ref 136–145)
WBC NRBC COR # BLD: 6.85 10*3/MM3 (ref 3.4–10.8)

## 2022-01-18 PROCEDURE — 85610 PROTHROMBIN TIME: CPT | Performed by: NURSE PRACTITIONER

## 2022-01-18 PROCEDURE — 97530 THERAPEUTIC ACTIVITIES: CPT

## 2022-01-18 PROCEDURE — 85018 HEMOGLOBIN: CPT | Performed by: STUDENT IN AN ORGANIZED HEALTH CARE EDUCATION/TRAINING PROGRAM

## 2022-01-18 PROCEDURE — 85025 COMPLETE CBC W/AUTO DIFF WBC: CPT | Performed by: INTERNAL MEDICINE

## 2022-01-18 PROCEDURE — 85014 HEMATOCRIT: CPT | Performed by: STUDENT IN AN ORGANIZED HEALTH CARE EDUCATION/TRAINING PROGRAM

## 2022-01-18 PROCEDURE — 25010000002 HEPARIN (PORCINE) 25000-0.45 UT/250ML-% SOLUTION: Performed by: INTERNAL MEDICINE

## 2022-01-18 PROCEDURE — 71045 X-RAY EXAM CHEST 1 VIEW: CPT

## 2022-01-18 PROCEDURE — 85730 THROMBOPLASTIN TIME PARTIAL: CPT | Performed by: INTERNAL MEDICINE

## 2022-01-18 PROCEDURE — 80048 BASIC METABOLIC PNL TOTAL CA: CPT | Performed by: NURSE PRACTITIONER

## 2022-01-18 PROCEDURE — 99024 POSTOP FOLLOW-UP VISIT: CPT | Performed by: NURSE PRACTITIONER

## 2022-01-18 PROCEDURE — 36415 COLL VENOUS BLD VENIPUNCTURE: CPT | Performed by: INTERNAL MEDICINE

## 2022-01-18 RX ORDER — WARFARIN SODIUM 7.5 MG/1
7.5 TABLET ORAL
Status: DISCONTINUED | OUTPATIENT
Start: 2022-01-18 | End: 2022-01-19

## 2022-01-18 RX ORDER — WARFARIN SODIUM 5 MG/1
5 TABLET ORAL
Status: DISCONTINUED | OUTPATIENT
Start: 2022-01-19 | End: 2022-01-19

## 2022-01-18 RX ADMIN — WARFARIN 7.5 MG: 7.5 TABLET ORAL at 17:20

## 2022-01-18 RX ADMIN — METOPROLOL SUCCINATE 25 MG: 25 TABLET, EXTENDED RELEASE ORAL at 08:35

## 2022-01-18 RX ADMIN — ACETAMINOPHEN 1000 MG: 500 TABLET, FILM COATED ORAL at 20:34

## 2022-01-18 RX ADMIN — DOCUSATE SODIUM 100 MG: 100 CAPSULE, LIQUID FILLED ORAL at 20:34

## 2022-01-18 RX ADMIN — ACETAMINOPHEN 1000 MG: 500 TABLET, FILM COATED ORAL at 08:35

## 2022-01-18 RX ADMIN — LISINOPRIL 10 MG: 10 TABLET ORAL at 08:35

## 2022-01-18 RX ADMIN — ACETAMINOPHEN 1000 MG: 500 TABLET, FILM COATED ORAL at 15:11

## 2022-01-18 RX ADMIN — ATORVASTATIN CALCIUM 10 MG: 20 TABLET, FILM COATED ORAL at 08:35

## 2022-01-18 RX ADMIN — DOCUSATE SODIUM 100 MG: 100 CAPSULE, LIQUID FILLED ORAL at 08:35

## 2022-01-18 RX ADMIN — HEPARIN SODIUM 15 UNITS/KG/HR: 10000 INJECTION, SOLUTION INTRAVENOUS at 14:25

## 2022-01-18 NOTE — PROGRESS NOTES
[facility] Clinical Pharmacy Services: Warfarin Dosing/Monitoring Consult    Claudio Krishna is a 66 y.o. male, estimated creatinine clearance is 107.3 mL/min (A) (by C-G formula based on SCr of 0.64 mg/dL (L)). weighing 99.3 kg (218 lb 14.7 oz).    Results from last 7 days   Lab Units 01/18/22  0906 01/17/22  1748 01/17/22  0439 01/16/22  1733 01/16/22  0328 01/15/22  2020 01/15/22  1114 01/15/22  0106 01/15/22  0106 01/14/22  1809 01/14/22  1126   INR  1.16*  --  1.22*  --  1.28*  --   --   --  1.34*  --  1.54*   APTT seconds 83.3*  --  67.0*  --  69.6* 85.6* 49.6*   < > 106.0*   < > 36.2*   HEMOGLOBIN g/dL 11.7* 11.9* 11.6* 11.4* 11.4*  --   --    < > 11.1*   < > 11.4*   HEMATOCRIT % 36.9* 36.2* 36.2* 35.6* 35.9*  --   --    < > 33.8*   < > 35.5*   PLATELETS 10*3/mm3 285  --  291  --  254  --   --   --  243  --  252    < > = values in this interval not displayed.     Prior to admission anticoagulation: Warfarin    Hospital Anticoagulation:  Consulting provider: Yumiko Flores  Start date: 1/18  Indication: mechanical aortic valve chronic anticoagulant required  Target INR: 2.5-3.5  Expected duration: indefinite   Bridge Therapy: Yes  with unfractionated heparin    Potential food or drug interactions: none of concern    Education complete?/Date: No; plan for follow up TBD    Assessment/Plan:  Dose: 7.5mg SuMTTF and 5mg Wsa (home dose restarted)  Monitor for any signs or symptoms of bleeding  Follow up daily INRs and dose adjustments  Will discontinue the heparin drip one therapeutic INR is achieved    Pharmacy will continue to follow until discharge or discontinuation of warfarin.     Bethany Babinski, PharmD  Clinical Pharmacist

## 2022-01-18 NOTE — PLAN OF CARE
Goal Outcome Evaluation:  Plan of Care Reviewed With: patient        Progress: improving  Outcome Summary: Patient is agreeable to PT and no further acute PT needs. Pt completed STS with Juan and ambulated 350ft without AD requiring Juan. No LOB or unsteadiness noted during ambulation. Pt is safe to discharge home when medically stable.

## 2022-01-18 NOTE — PROGRESS NOTES
Name: Claudio Krishna ADMIT: 2022   : 1955  PCP: Shayan Celeste MD    MRN: [id number] LOS: 16 days   AGE/SEX: 66 y.o. male  ROOM: [facility]     Subjective   Subjective   Patient seen this morning.  No new events overnight.  Posterior chest tube was removed yesterday.  Has minimal pain, states well with pain medication controlled.  Hemoglobin remained stable.  Denies chest pain, shortness of breath.     Review of Systems   As above  Objective   Objective   Vital Signs  Temp:  [97.5 °F (36.4 °C)-98.6 °F (37 °C)] 98.3 °F (36.8 °C)  Heart Rate:  [65-71] 71  Resp:  [16] 16  BP: (112-152)/(64-86) 130/71  SpO2:  [94 %-96 %] 96 %  on   ;   Device (Oxygen Therapy): room air  Body mass index is 31.41 kg/m².  Physical Exam  Constitutional:       Appearance: Normal appearance,  HENT:      Head: Normocephalic and atraumatic.   Cardiovascular:      Rate and Rhythm: Normal rate and regular rhythm.      Heart sounds Murmur heard.   Pulmonary:      Effort: Pulmonary effort is normal.      Breath sounds: Normal breath sounds, decreased breath sounds right lower quadrant.    Abdominal:      General: Bowel sounds are normal. There is no distension.      Palpations: Abdomen is soft.      Tenderness: There is no abdominal tenderness.      Comments:  bruising to his abdomen, worse on right side and extends to his right flank (significantly improved compared to admission.)  Skin:     General: Skin is warm and dry.   Neurological:      Mental Status: He is alert and oriented to person, place, and time. Mental status is at baseline.   Psychiatric:         Mood and Affect: Mood normal.         Behavior: Behavior normal.      Results Review     I reviewed the patient's new clinical results.  Results from last 7 days   Lab Units 22  0906 22  1748 22  0439 22  1733 22  0328 22  0328 01/15/22  0106 01/15/22  0106   WBC 10*3/mm3 6.85  --  5.80  --   --  6.15  --  6.81   HEMOGLOBIN g/dL 11.7* 11.9*  11.6* 11.4*   < > 11.4*   < > 11.1*   PLATELETS 10*3/mm3 285  --  291  --   --  254  --  243    < > = values in this interval not displayed.     Results from last 7 days   Lab Units 01/18/22  0906 01/17/22  0439 01/16/22 0328 01/15/22  0106   SODIUM mmol/L 136 135* 138 137   POTASSIUM mmol/L 4.1 4.3 5.0 4.4   CHLORIDE mmol/L 100 100 103 105   CO2 mmol/L 26.2 25.9 29.6* 27.0   BUN mg/dL 10 11 10 11   CREATININE mg/dL 0.64* 0.62* 0.67* 0.61*   GLUCOSE mg/dL 143* 106* 116* 119*   Estimated Creatinine Clearance: 107.3 mL/min (A) (by C-G formula based on SCr of 0.64 mg/dL (L)).      Results from last 7 days   Lab Units 01/18/22  0906 01/17/22  0439 01/16/22  0328 01/15/22  0106   CALCIUM mg/dL 8.8 8.4* 8.7 8.3*         COVID19   Date Value Ref Range Status   01/11/2022 Not Detected Not Detected - Ref. Range Final   01/01/2022 Not Detected Not Detected - Ref. Range Final     Glucose   Date/Time Value Ref Range Status   01/16/2022 1507 109 70 - 130 mg/dL Final     Comment:     Meter: [id number] : [id number] Braulio Warren NA       XR Chest 1 View  ONE VIEW PORTABLE CHEST AT 5:28 AM     HISTORY: Recent right chest surgery. Chest tube removal.     FINDINGS: The right-sided chest tube is been removed and there is a tiny  right apical pneumothorax measuring 7 mm that is unchanged from  yesterday's exam. There is some persistent pleural fluid and atelectasis  at the right base unchanged. The heart remains mildly enlarged with  sternal wires from previous cardiac surgery.     This report was finalized on 1/18/2022 1:00 PM by Dr. Ang Cool M.D.       Scheduled Medications  acetaminophen, 1,000 mg, Oral, TID  atorvastatin, 10 mg, Oral, Daily  docusate sodium, 100 mg, Oral, BID  lisinopril, 10 mg, Oral, Q24H  metoprolol succinate XL, 25 mg, Oral, Q24H  polyethylene glycol, 17 g, Oral, Daily  senna-docusate sodium, 2 tablet, Oral, Nightly  [START ON 1/19/2022] warfarin, 5 mg, Oral, Once per day on Wed Sat  warfarin, 7.5 mg,  Oral, Once per day on Sun Mon Tue Thu Fri    Infusions  heparin, 12 Units/kg/hr, Last Rate: 15 Units/kg/hr (01/18/22 1425)  hold, 1 each  Pharmacy to dose warfarin,     Diet  Diet Regular; Thin       Assessment/Plan     Active Hospital Problems    Diagnosis  POA   • **Hemothorax [J94.2]  Unknown   • Cardiac arrest (HCC) [I46.9]  Unknown   • Mechanical heart valve present [Z95.2]  Not Applicable   • Essential hypertension [I10]  Unknown   • Abdominal wall hematoma [S30.1XXA]  Yes      Resolved Hospital Problems   No resolved problems to display.       Hemothorax/status post PEA arreast  -Cardiothoracic surgery consulted  -Patient has underwent a thoracentesis with 500 mL off  -was doing doing well initially, and was restarted on heparin drip with warfarin bridge.  However had PEA arrest; brief.  Resuscitated successfully.-Head 1/10 s/p emergent Rt CT 1/10  S/p VATS 1/12  -Transferred back to the floor 01/14  -Currently on heparin drip, not on warfarin.  -Anterior chest tube removed 01/15  -Cardiothoracic surgery following.  -Hemoglobin stable around 11  -Plan to remove posterior chest tube 01/16 and to resume warfarin afterwards once okay per surgery.  No plan for bridging per cardiology in the setting of recent bleeding issues     #Large abdominal wall hematoma/hemothorax  -general Surgery consulted, no indications for interventions.    -Hematoma improving.     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  -Posterior chest tube removed 01/17  -Warfarin restarted 01/18  -If hemoglobin remains stable, okay to be discharged tomorrow.       #Essential hypertension  -Continue lisinopril, metoprolol  -blood pressure stable.    · DVT prophylaxis heparin gtt   · Full code.  Discussed with patient   Disposition: Likely discharge  from tomorrow if hemoglobin stable    Taniya Nicholas MD  [facility]  01/18/22  15:22 EST

## 2022-01-18 NOTE — PROGRESS NOTES
"  POST-OPERATIVE NOTE     Chief Complaint: Hemothorax  S/P: Bronchoscopy, right video-assisted thoracoscopy with complete decortication and evacuation of hemothorax  POD # 5    Subjective:  Symptoms:  Stable.  No shortness of breath or chest pain.    Diet:  Adequate intake.  No nausea or vomiting.    Activity level: Returning to normal.    Pain:  He reports no pain.    Up To chair.  Pain well controlled.  Some drainage around chest tube site.    Objective:  General Appearance:  Comfortable, in no acute distress and well-appearing.    Vital signs: (most recent): Blood pressure 112/70, pulse 71, temperature 98.1 °F (36.7 °C), temperature source Oral, resp. rate 16, height 177.8 cm (70\"), weight 99.3 kg (218 lb 14.7 oz), SpO2 96 %.  Vital signs are normal.  No fever.    Output: Producing urine.    Lungs:  Normal effort and normal respiratory rate.  He is not in respiratory distress.  There are decreased breath sounds.  No wheezes or rhonchi.    Heart: Normal rate.  Regular rhythm.    Chest: Chest wall tenderness (Incisional) present.  (Chest tube site dressing reinforced.  Patient had some serous drainage overnight.  Dressing is dry now.)  Abdomen: Abdomen is soft.  (Ecchymosis to right flank).  There is no abdominal tenderness.     Pulses: Distal pulses are intact.    Neurological: Patient is alert and oriented to person, place and time.    Skin:  Warm and dry.  There is ecchymosis.            Results Review:     I reviewed the patient's new clinical results.  I reviewed the patient's new imaging results and agree with the interpretation.  I reviewed the patient's other test results and agree with the interpretation.     Assessment/Plan     Mr. Krishna is a pleasant 66-year-old gentleman with a hemothorax status post VATS decortication.  He has done very well postoperatively.  Final chest tube was removed yesterday.  This morning's chest x-ray has a similar appearance with a very small right apical pleural separation.  " There is some stable pleural fluid and atelectasis at the right base.     Currently on heparin drip per cardiology for history of mechanical valve.  May resume oral anticoagulation at any time from our standpoint.    Continue pulmonary care and ambulation.  Patient is stable for discharge from a thoracic surgical standpoint.  We will arrange for him to follow-up to see us in the office in 2 weeks with a hospital performed chest x-ray.    DWAYNE Martin  [facility]  01/18/22  13:10 EST    Patient was seen and assessed while wearing personal protective equipment including facemask, protective eyewear and gloves.  Hand hygiene performed prior to entering the room and upon exiting with doffing of gloves.

## 2022-01-18 NOTE — THERAPY DISCHARGE NOTE
Patient Name: Claudio Krishna  : 1955    MRN: [id number]                              Today's Date: 2022       Admit Date: 2022    Visit Dx:     ICD-10-CM ICD-9-CM   1. Abdominal wall hematoma, initial encounter  S30.1XXA 922.2   2. Hemothorax on right  J94.2 511.89   3. Chronic anticoagulation  Z79.01 V58.61   4. Mechanical heart valve present  Z95.2 V43.3   5. Hemothorax  J94.2 511.89     Patient Active Problem List   Diagnosis   • Abdominal wall hematoma   • Coronary artery disease   • Mechanical heart valve present   • Hemothorax   • Essential hypertension   • Cardiac arrest (HCC)     Past Medical History:   Diagnosis Date   • Coronary artery disease    • Hypertension      Past Surgical History:   Procedure Laterality Date   • AORTA SURGERY  2011   • HERNIA REPAIR     • KNEE MINI REVISION     • THORACOSCOPY WITH DECORTICATION Right 2022    Procedure: BRONCHOSCOPY, RIGHT THORACOSCOPY VIDEO ASSISTED WITH COMPLETE DECORTICATION;  Surgeon: Sol Andrews MD;  Location: [facility];  Service: Thoracic;  Laterality: Right;      General Information     Row Name 22 1227          Physical Therapy Time and Intention    Document Type discharge treatment  -CB     Mode of Treatment individual therapy; physical therapy  -CB     Row Name 22 1227          General Information    Patient Profile Reviewed yes  -CB     Existing Precautions/Restrictions no known precautions/restrictions  -CB     Row Name 22 1227          Cognition    Orientation Status (Cognition) oriented x 4  -CB           User Key  (r) = Recorded By, (t) = Taken By, (c) = Cosigned By    Initials Name Provider Type    CB Leti Guo PT Physical Therapist               Mobility     Row Name 22 1227          Sit-Stand Transfer    Sit-Stand [place] (Transfers) modified independence  -CB     Row Name 22 1227          Gait/Stairs (Locomotion)    [place] Level (Gait) modified independence  -CB      Assistive Device (Gait) --  no AD  -CB     Distance in Feet (Gait) 350ft  -CB     Deviations/Abnormal Patterns (Gait) gait speed decreased  -CB     Bilateral Gait Deviations forward flexed posture  -CB     Comment (Gait/Stairs) no LOB or unsteadiness noted  -CB           User Key  (r) = Recorded By, (t) = Taken By, (c) = Cosigned By    Initials Name Provider Type    CB Leti Guo, KITTY Physical Therapist               Obj/Interventions     Row Name 01/18/22 1228          Balance    Balance Assessment sitting static balance; sitting dynamic balance; standing static balance; standing dynamic balance  -CB     Static Sitting Balance WFL  -CB     Dynamic Sitting Balance WFL  -CB     Static Standing Balance WFL  -CB     Dynamic Standing Balance WFL  -CB           User Key  (r) = Recorded By, (t) = Taken By, (c) = Cosigned By    Initials Name Provider Type    Leti Chavez, KITTY Physical Therapist               Goals/Plan    No documentation.                Clinical Impression     Row Name 01/18/22 1228          Pain    Additional Documentation Pain Scale: Numbers Pre/Post-Treatment (Group)  -CB     Row Name 01/18/22 1228          Pain Scale: Numbers Pre/Post-Treatment    Pretreatment Pain Rating 0/10 - no pain  -CB     Posttreatment Pain Rating 0/10 - no pain  -CB     Row Name 01/18/22 1228          Plan of Care Review    Plan of Care Reviewed With patient  -CB     Progress improving  -CB     Outcome Summary Patient is agreeable to PT and no further acute PT needs. Pt completed STS with Juan and ambulated 350ft without AD requiring Juan. No LOB or unsteadiness noted during ambulation. Pt is safe to discharge home when medically stable.  -CB     Row Name 01/18/22 1228          Positioning and Restraints    Pre-Treatment Position sitting in chair/recliner  -CB     Post Treatment Position bathroom  -CB     Bathroom notified nsg; sitting; call light within reach  -CB           User Key  (r) = Recorded By, (t) = Taken  By, (c) = Cosigned By    Initials Name Provider Type    Leti Chavez, PT Physical Therapist               Outcome Measures     Row Name 01/18/22 1230          How much help from another person do you currently need...    Turning from your back to your side while in flat bed without using bedrails? 4  -CB     Moving from lying on back to sitting on the side of a flat bed without bedrails? 4  -CB     Moving to and from a bed to a chair (including a wheelchair)? 4  -CB     Standing up from a chair using your arms (e.g., wheelchair, bedside chair)? 4  -CB     Climbing 3-5 steps with a railing? 4  -CB     To walk in hospital room? 4  -CB     AM-PAC 6 Clicks Score (PT) 24  -CB     Row Name 01/18/22 1230          Functional Assessment    Outcome Measure Options AM-PAC 6 Clicks Basic Mobility (PT)  -CB           User Key  (r) = Recorded By, (t) = Taken By, (c) = Cosigned By    Initials Name Provider Type    Leti Chavez, KITTY Physical Therapist              Physical Therapy Education                 Title: PT OT SLP Therapies (Done)     Topic: Physical Therapy (Done)     Point: Mobility training (Done)     Learning Progress Summary           Patient Acceptance, E,TB,D, VU,NR by CB at 1/18/2022 1230    Acceptance, E,TB,D, VU,NR by CB at 1/17/2022 1413    Acceptance, E,TB,D, VU,DU by SC at 1/16/2022 1443    Acceptance, E, NR by AR at 1/14/2022 1654    Acceptance, E,TB,D, VU,NR by  at 1/13/2022 1650   Family Acceptance, E,TB,D, VU,DU by SC at 1/16/2022 1443                   Point: Home exercise program (Done)     Learning Progress Summary           Patient Acceptance, E,TB,D, VU,DU by SC at 1/16/2022 1443    Acceptance, E, NR by AR at 1/14/2022 1654   Family Acceptance, E,TB,D, VU,DU by SC at 1/16/2022 1443                   Point: Body mechanics (Done)     Learning Progress Summary           Patient Acceptance, E,TB,D, VU,NR by CB at 1/18/2022 1230    Acceptance, E,TB,D, VU,NR by  at 1/17/2022 1413    Acceptance,  E,TB,D, VU,DU by SC at 1/16/2022 1443    Acceptance, E, NR by AR at 1/14/2022 1654    Acceptance, E,TB,D, VU,NR by  at 1/13/2022 1650   Family Acceptance, E,TB,D, VU,DU by SC at 1/16/2022 1443                   Point: Precautions (Done)     Learning Progress Summary           Patient Acceptance, E,TB,D, VU,NR by  at 1/18/2022 1230    Acceptance, E,TB,D, VU,NR by  at 1/17/2022 1413    Acceptance, E,TB,D, VU,DU by SC at 1/16/2022 1443    Acceptance, E, NR by AR at 1/14/2022 1654    Acceptance, E,TB,D, VU,NR by  at 1/13/2022 1650   Family Acceptance, E,TB,D, VU,DU by SC at 1/16/2022 1443                               User Key     Initials Effective Dates Name Provider Type Discipline    SC 06/16/21 -  Leyda Ames, PT Physical Therapist PT     06/16/21 -  Sherley Bob, PT Physical Therapist PT    AR 06/16/21 -  Stephanie Thompson PT Physical Therapist PT     10/22/21 -  Leti Guo PT Physical Therapist PT              PT Recommendation and Plan     Plan of Care Reviewed With: patient  Progress: improving  Outcome Summary: Patient is agreeable to PT and no further acute PT needs. Pt completed STS with Juan and ambulated 350ft without AD requiring Juan. No LOB or unsteadiness noted during ambulation. Pt is safe to discharge home when medically stable.     Time Calculation:    PT Charges     Row Name 01/18/22 1230             Time Calculation    Start Time 0949  -CB      Stop Time 0957  -CB      Time Calculation (min) 8 min  -CB              Time Calculation- PT    Total Timed Code Minutes- PT 8 minute(s)  -CB              Timed Charges    58014 - PT Therapeutic Activity Minutes 8  -CB              Total Minutes    Timed Charges Total Minutes 8  -CB       Total Minutes 8  -CB            User Key  (r) = Recorded By, (t) = Taken By, (c) = Cosigned By    Initials Name Provider Type    CB Leti Guo, PT Physical Therapist              Therapy Charges for Today     Code Description Service Date  Service Provider Modifiers Qty    10893984559 HC PT THERAPEUTIC ACT EA 15 MIN 1/17/2022 Leti Guo, PT GP 1    71748928308 HC PT THERAPEUTIC ACT EA 15 MIN 1/18/2022 Leti Guo, PT GP 1          PT G-Codes  Outcome Measure Options: AM-PAC 6 Clicks Basic Mobility (PT)  AM-PAC 6 Clicks Score (PT): 24         Leti Guo, PT  1/18/2022

## 2022-01-18 NOTE — CASE MANAGEMENT/SOCIAL WORK
Continued Stay Note  Psychiatric     Patient Name: Claudio Krishna  MRN: [id number]  Today's Date: 1/18/2022    Admit Date: 1/1/2022     Discharge Plan     Row Name 01/18/22 1019       Plan    Plan Home with wife    Plan Comments CCP met with patient at bedside. Patient confirmed plan is to return home at discharge. Patient does not feel like HH or DME is needed at discharge. Patient states his wife will provide transportation at discharge and can assist him as needed. Patient currently on room air. CCP will follow for any needs to arise. Maxine COLLINS               Discharge Codes    No documentation.               Expected Discharge Date and Time     Expected Discharge Date Expected Discharge Time    Jan 19, 2022             KARINA Rosales

## 2022-01-18 NOTE — PLAN OF CARE
Goal Outcome Evaluation:  Plan of Care Reviewed With: patient        Progress: improving  Outcome Summary: VSS, no complaints of pain. Up with standby assist. Heparin gtt infusing. Coumadin ordered to be given tonight.

## 2022-01-18 NOTE — PLAN OF CARE
Problem: Adult Inpatient Plan of Care  Goal: Plan of Care Review  Flowsheets (Taken 1/18/2022 6436)  Plan of Care Reviewed With: patient  Outcome Summary: dressing changed to right chest tube site moderate amount of tan drainage with serosanguinous drainage noted no complaints of pain bruising noted to right side flank area and abdomen heparin drip continues   Goal Outcome Evaluation:  Plan of Care Reviewed With: patient           Outcome Summary: dressing changed to right chest tube site moderate amount of tan drainage with serosanguinous drainage noted no complaints of pain bruising noted to right side flank area and abdomen heparin drip continues

## 2022-01-18 NOTE — PROGRESS NOTES
Claudio Krishna   66 y.o.  male    LOS: 16 days   Patient Care Team:  Shayan Celeste MD as PCP - General (Family Medicine)      Subjective   No acute complaints  Review of Systems:   Lungs: no cough, no soa  CV: no CP, no palpitations  GI: no nausea, vomiting, diarrhea     Medication Review:   Current Facility-Administered Medications:   •  acetaminophen (TYLENOL) tablet 1,000 mg, 1,000 mg, Oral, TID, Leyda Rodriguez APRN, 1,000 mg at 01/18/22 0835  •  acetaminophen (TYLENOL) tablet 650 mg, 650 mg, Oral, Q4H PRN, Leyda Rodriguez APRN, 650 mg at 01/10/22 1640  •  atorvastatin (LIPITOR) tablet 10 mg, 10 mg, Oral, Daily, Leyda Rodriguez APRN, 10 mg at 01/18/22 0835  •  benzonatate (TESSALON) capsule 100 mg, 100 mg, Oral, TID PRN, Leyda Rodriguez APRN, 100 mg at 01/07/22 0933  •  bisacodyl (DULCOLAX) suppository 10 mg, 10 mg, Rectal, Daily PRN, Leyda Rodriguez APRN  •  docusate sodium (COLACE) capsule 100 mg, 100 mg, Oral, BID, Leyda Rodriguez APRN, 100 mg at 01/18/22 0835  •  heparin 46572 units/250 mL (100 units/mL) in 0.45 % NaCl infusion, 12 Units/kg/hr, Intravenous, Titrated, Colin Yun MD, Last Rate: 15.75 mL/hr at 01/17/22 2330, 15 Units/kg/hr at 01/17/22 2330  •  Hold medication, 1 each, Does not apply, Continuous PRN, Leyda Rodriguez APRN  •  HYDROcodone-acetaminophen (NORCO) 5-325 MG per tablet 1 tablet, 1 tablet, Oral, Q6H PRN, Leyda Rodriguez APRN  •  HYDROmorphone (DILAUDID) injection 0.5 mg, 0.5 mg, Intravenous, Q2H PRN, Leyda Rodriguez APRN, 0.5 mg at 01/12/22 1645  •  ketamine hcl syringe solution prefilled syringe 10 mg, 10 mg, Intravenous, Q15 Min PRN, Leyda Rodriguez APRN  •  lisinopril (PRINIVIL,ZESTRIL) tablet 10 mg, 10 mg, Oral, Q24H, Colin Yun MD, 10 mg at 01/18/22 0835  •  magnesium hydroxide (MILK OF MAGNESIA) suspension 10 mL, 10 mL, Oral, Daily PRN, Leyda Rodriguez APRN  •  melatonin tablet 3 mg, 3 mg, Oral, Nightly PRN, Leyda Rodriguez APRN, 3 mg at  01/10/22 0120  •  metoprolol succinate XL (TOPROL-XL) 24 hr tablet 25 mg, 25 mg, Oral, Q24H, Cloin Yun MD, 25 mg at 01/18/22 0835  •  morphine injection 2 mg, 2 mg, Intravenous, Q4H PRN, Leyda Rodriguez APRN, 2 mg at 01/11/22 2317  •  nitroglycerin (NITROSTAT) SL tablet 0.4 mg, 0.4 mg, Sublingual, Q5 Min PRN, Leyda Rodriguez APRN  •  ondansetron (ZOFRAN) tablet 4 mg, 4 mg, Oral, Q6H PRN **OR** ondansetron (ZOFRAN) injection 4 mg, 4 mg, Intravenous, Q6H PRN, Leyda Rodriguez APRN  •  oxyCODONE (ROXICODONE) immediate release tablet 5 mg, 5 mg, Oral, Q4H PRN, Leyda Rodriguez APRN  •  polyethylene glycol (MIRALAX) packet 17 g, 17 g, Oral, Daily, Leyda Rodriguez APRN, 17 g at 01/15/22 0808  •  sennosides-docusate (PERICOLACE) 8.6-50 MG per tablet 2 tablet, 2 tablet, Oral, Nightly, Leyda Rodriguez APRN, 2 tablet at 01/14/22 2045  •  [COMPLETED] Insert peripheral IV, , , Once **AND** sodium chloride 0.9 % flush 10 mL, 10 mL, Intravenous, PRN, Leyda Rodriguez APRN      Objective     Vital Sign Min/Max for last 24 hours  Temp  Min: 97.5 °F (36.4 °C)  Max: 98.6 °F (37 °C)   BP  Min: 119/69  Max: 152/76    Pulse  Min: 65  Max: 69         01/16/22  0600 01/17/22  0547 01/18/22  0500   Weight: 99.9 kg (220 lb 3.8 oz) 99.5 kg (219 lb 5.7 oz) 99.3 kg (218 lb 14.7 oz)        Intake/Output Summary (Last 24 hours) at 1/18/2022 0924  Last data filed at 1/18/2022 0700  Gross per 24 hour   Intake 757.45 ml   Output 1100 ml   Net -342.55 ml       Physical Exam:    General Appearance:     no acute distress   Lungs:     Clear to auscultation bilaterally. No wheezes, rhonchi or rales.     Heart:    Regular rate and rhythm.  Normal S1 and S2. No murmur, no gallop, rub or lift. , no JVD   Abdomen:     Normal bowel sounds, soft, non-tender, non-distended,   Extremities:   No clubbing, cyanosis or edema.     Skin:   Warm, dry, CT dressing site   Neurologic:   awake alert and oriented x3      Monitor: sr  Results Review:     I  reviewed the patient's new clinical results.    Sodium   Date Value Ref Range Status   01/17/2022 135 (L) 136 - 145 mmol/L Final   01/16/2022 138 136 - 145 mmol/L Final     Potassium   Date Value Ref Range Status   01/17/2022 4.3 3.5 - 5.2 mmol/L Final     Comment:     Slight hemolysis detected by analyzer. Results may be affected.   01/16/2022 5.0 3.5 - 5.2 mmol/L Final     Chloride   Date Value Ref Range Status   01/17/2022 100 98 - 107 mmol/L Final   01/16/2022 103 98 - 107 mmol/L Final     No results found for: PLASMABICARB  BUN   Date Value Ref Range Status   01/17/2022 11 8 - 23 mg/dL Final   01/16/2022 10 8 - 23 mg/dL Final     Creatinine   Date Value Ref Range Status   01/17/2022 0.62 (L) 0.76 - 1.27 mg/dL Final   01/16/2022 0.67 (L) 0.76 - 1.27 mg/dL Final     Calcium   Date Value Ref Range Status   01/17/2022 8.4 (L) 8.6 - 10.5 mg/dL Final   01/16/2022 8.7 8.6 - 10.5 mg/dL Final     No results found for: MG    Results from last 7 days   Lab Units 01/17/22  1748 01/17/22  0439 01/17/22  0439   WBC 10*3/mm3  --   --  5.80   HEMOGLOBIN g/dL 11.9*   < > 11.6*   HEMATOCRIT % 36.2*   < > 36.2*   PLATELETS 10*3/mm3  --   --  291    < > = values in this interval not displayed.     No results found for: CHOL  No results found for: HDL  No results found for: LDL  No results found for: TRIG  Results from last 7 days   Lab Units 01/17/22  0439 01/16/22  0328 01/15/22  0106   INR  1.22* 1.28* 1.34*                       Echo EF Estimated  No results found for: ECHOEFEST    1. Hemothorax; code on 1/9/2022.  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 right-sided pleural effusion        -s/p R thoracentesis 1/3/21:500cc of bloody fluid removed  4. HTN     INR 1.77 yesterfday and INR pending today.  Discussed with Dr. Posada who wants to resume patient's home dose of coumadin (5 mg  Wednesday/Saturday; 7.5 mg warfarin every other day) and to Stop heparin tomorrow.     During the entire encounter, I was wearing recommended PPE including face mask and eye protection.  Hand sanitization was performed prior to entering room and upon exit.      Yumiko Flores, APRN  01/18/22  09:24 EST      Time: 25 min  Physician Evaluation  Reviewed our cardiology group Nurse Practitioner's note.    Pt interviewed and examined.   Findings verified.   Reviewed and agree with corrections or modifications of history, physical, and plans as indicated:     Examination shows stable and normal aortic valve clicks, with outflow murmur. Dullness in the right base, with mild egophony, but no rales bilateral. No edema and no ascites.  Will initiate Coumadin as above. Would note that patient exhibited recurrent bleeding right lung, with INR less than 2. INR today 1.16 (1.22). Would continue with the Coumadin as he has done for 10 years. He has a INR evaluation scheduled for 1/26/2022. As long as hemoglobin is okay tomorrow, patient may be discharged. Patient continues in sinus rhythm. High flow aortic valve.    Rickie Posada MD    Please note that portions of this note were completed with a voice recognition program.

## 2022-01-19 ENCOUNTER — APPOINTMENT (OUTPATIENT)
Dept: GENERAL RADIOLOGY | Facility: HOSPITAL | Age: 67
End: 2022-01-19

## 2022-01-19 VITALS
HEART RATE: 67 BPM | RESPIRATION RATE: 16 BRPM | OXYGEN SATURATION: 94 % | BODY MASS INDEX: 31.03 KG/M2 | HEIGHT: 70 IN | TEMPERATURE: 97.5 F | WEIGHT: 216.71 LBS | DIASTOLIC BLOOD PRESSURE: 67 MMHG | SYSTOLIC BLOOD PRESSURE: 134 MMHG

## 2022-01-19 LAB
ANION GAP SERPL CALCULATED.3IONS-SCNC: 9.7 MMOL/L (ref 5–15)
APTT PPP: 83.7 SECONDS (ref 22.7–35.4)
BASOPHILS # BLD AUTO: 0.03 10*3/MM3 (ref 0–0.2)
BASOPHILS NFR BLD AUTO: 0.5 % (ref 0–1.5)
BUN SERPL-MCNC: 10 MG/DL (ref 8–23)
BUN/CREAT SERPL: 15.6 (ref 7–25)
CALCIUM SPEC-SCNC: 8.7 MG/DL (ref 8.6–10.5)
CHLORIDE SERPL-SCNC: 102 MMOL/L (ref 98–107)
CO2 SERPL-SCNC: 25.3 MMOL/L (ref 22–29)
CREAT SERPL-MCNC: 0.64 MG/DL (ref 0.76–1.27)
DEPRECATED RDW RBC AUTO: 43.7 FL (ref 37–54)
EOSINOPHIL # BLD AUTO: 0.45 10*3/MM3 (ref 0–0.4)
EOSINOPHIL NFR BLD AUTO: 7.7 % (ref 0.3–6.2)
ERYTHROCYTE [DISTWIDTH] IN BLOOD BY AUTOMATED COUNT: 12.9 % (ref 12.3–15.4)
GFR SERPL CREATININE-BSD FRML MDRD: 125 ML/MIN/1.73
GLUCOSE SERPL-MCNC: 101 MG/DL (ref 65–99)
HCT VFR BLD AUTO: 34 % (ref 37.5–51)
HGB BLD-MCNC: 11.2 G/DL (ref 13–17.7)
IMM GRANULOCYTES # BLD AUTO: 0.06 10*3/MM3 (ref 0–0.05)
IMM GRANULOCYTES NFR BLD AUTO: 1 % (ref 0–0.5)
INR PPP: 1.17 (ref 0.9–1.1)
LYMPHOCYTES # BLD AUTO: 1.02 10*3/MM3 (ref 0.7–3.1)
LYMPHOCYTES NFR BLD AUTO: 17.5 % (ref 19.6–45.3)
MCH RBC QN AUTO: 30.7 PG (ref 26.6–33)
MCHC RBC AUTO-ENTMCNC: 32.9 G/DL (ref 31.5–35.7)
MCV RBC AUTO: 93.2 FL (ref 79–97)
MONOCYTES # BLD AUTO: 0.62 10*3/MM3 (ref 0.1–0.9)
MONOCYTES NFR BLD AUTO: 10.6 % (ref 5–12)
NEUTROPHILS NFR BLD AUTO: 3.65 10*3/MM3 (ref 1.7–7)
NEUTROPHILS NFR BLD AUTO: 62.7 % (ref 42.7–76)
NRBC BLD AUTO-RTO: 0 /100 WBC (ref 0–0.2)
PLATELET # BLD AUTO: 270 10*3/MM3 (ref 140–450)
PMV BLD AUTO: 9.5 FL (ref 6–12)
POTASSIUM SERPL-SCNC: 4.4 MMOL/L (ref 3.5–5.2)
PROTHROMBIN TIME: 14.7 SECONDS (ref 11.7–14.2)
RBC # BLD AUTO: 3.65 10*6/MM3 (ref 4.14–5.8)
SODIUM SERPL-SCNC: 137 MMOL/L (ref 136–145)
WBC NRBC COR # BLD: 5.83 10*3/MM3 (ref 3.4–10.8)

## 2022-01-19 PROCEDURE — 85730 THROMBOPLASTIN TIME PARTIAL: CPT | Performed by: INTERNAL MEDICINE

## 2022-01-19 PROCEDURE — 85610 PROTHROMBIN TIME: CPT | Performed by: NURSE PRACTITIONER

## 2022-01-19 PROCEDURE — 71045 X-RAY EXAM CHEST 1 VIEW: CPT

## 2022-01-19 PROCEDURE — 85025 COMPLETE CBC W/AUTO DIFF WBC: CPT | Performed by: INTERNAL MEDICINE

## 2022-01-19 PROCEDURE — 25010000002 HEPARIN (PORCINE) 25000-0.45 UT/250ML-% SOLUTION: Performed by: INTERNAL MEDICINE

## 2022-01-19 PROCEDURE — 80048 BASIC METABOLIC PNL TOTAL CA: CPT | Performed by: NURSE PRACTITIONER

## 2022-01-19 RX ORDER — WARFARIN SODIUM 7.5 MG/1
7.5 TABLET ORAL
Status: DISCONTINUED | OUTPATIENT
Start: 2022-01-19 | End: 2022-01-19 | Stop reason: HOSPADM

## 2022-01-19 RX ORDER — AMOXICILLIN 250 MG
2 CAPSULE ORAL NIGHTLY
Qty: 60 TABLET | Refills: 0 | Status: SHIPPED | OUTPATIENT
Start: 2022-01-19 | End: 2022-02-18

## 2022-01-19 RX ORDER — BENZONATATE 100 MG/1
100 CAPSULE ORAL 3 TIMES DAILY PRN
Qty: 90 CAPSULE | Refills: 0 | Status: SHIPPED | OUTPATIENT
Start: 2022-01-19 | End: 2022-02-18

## 2022-01-19 RX ORDER — METOPROLOL SUCCINATE 25 MG/1
25 TABLET, EXTENDED RELEASE ORAL DAILY
Qty: 30 TABLET | Refills: 0 | Status: SHIPPED | OUTPATIENT
Start: 2022-01-19 | End: 2022-02-18

## 2022-01-19 RX ORDER — OXYCODONE HYDROCHLORIDE 5 MG/1
5 TABLET ORAL EVERY 4 HOURS PRN
Qty: 18 TABLET | Refills: 0 | Status: SHIPPED | OUTPATIENT
Start: 2022-01-19 | End: 2022-01-22

## 2022-01-19 RX ORDER — PSEUDOEPHEDRINE HCL 30 MG
100 TABLET ORAL 2 TIMES DAILY
Qty: 60 CAPSULE | Refills: 0 | Status: SHIPPED | OUTPATIENT
Start: 2022-01-19 | End: 2022-02-18

## 2022-01-19 RX ADMIN — DOCUSATE SODIUM 100 MG: 100 CAPSULE, LIQUID FILLED ORAL at 08:23

## 2022-01-19 RX ADMIN — ATORVASTATIN CALCIUM 10 MG: 20 TABLET, FILM COATED ORAL at 08:23

## 2022-01-19 RX ADMIN — HEPARIN SODIUM 15 UNITS/KG/HR: 10000 INJECTION, SOLUTION INTRAVENOUS at 06:35

## 2022-01-19 RX ADMIN — ACETAMINOPHEN 1000 MG: 500 TABLET, FILM COATED ORAL at 08:23

## 2022-01-19 RX ADMIN — METOPROLOL SUCCINATE 25 MG: 25 TABLET, EXTENDED RELEASE ORAL at 08:23

## 2022-01-19 RX ADMIN — LISINOPRIL 10 MG: 10 TABLET ORAL at 08:23

## 2022-01-19 NOTE — PROGRESS NOTES
Claudio Krishna   66 y.o.  male    LOS: 17 days   Patient Care Team:  Shayan Celeste MD as PCP - General (Family Medicine)      Subjective     Interval History:     Patient Complaints: No shortness of air.  No chest pain.  No palpitations or lightheadedness.    Review of Systems:   No obvious bleeding issues  No subjective fever chills    Medication Review:   Current Facility-Administered Medications:   •  acetaminophen (TYLENOL) tablet 1,000 mg, 1,000 mg, Oral, TID, Leyda Rodriguez APRN, 1,000 mg at 01/18/22 2034  •  acetaminophen (TYLENOL) tablet 650 mg, 650 mg, Oral, Q4H PRN, Leyda Rodriguez APRN, 650 mg at 01/10/22 1640  •  atorvastatin (LIPITOR) tablet 10 mg, 10 mg, Oral, Daily, Leyda Rodriguez APRN, 10 mg at 01/18/22 0835  •  benzonatate (TESSALON) capsule 100 mg, 100 mg, Oral, TID PRN, Leyda Rodriguez APRN, 100 mg at 01/07/22 0933  •  bisacodyl (DULCOLAX) suppository 10 mg, 10 mg, Rectal, Daily PRN, Leyda Rodriguez APRN  •  docusate sodium (COLACE) capsule 100 mg, 100 mg, Oral, BID, Leyda Rodriguez APRN, 100 mg at 01/18/22 2034  •  heparin 61653 units/250 mL (100 units/mL) in 0.45 % NaCl infusion, 12 Units/kg/hr, Intravenous, Titrated, Colin Yun MD, Last Rate: 15.75 mL/hr at 01/19/22 0635, 15 Units/kg/hr at 01/19/22 0635  •  Hold medication, 1 each, Does not apply, Continuous PRN, Leyda Rodriguez APRN  •  HYDROmorphone (DILAUDID) injection 0.5 mg, 0.5 mg, Intravenous, Q2H PRN, Leyda Rodriguez APRN, 0.5 mg at 01/12/22 1645  •  ketamine hcl syringe solution prefilled syringe 10 mg, 10 mg, Intravenous, Q15 Min PRN, Leyda Rodriguez APRN  •  lisinopril (PRINIVIL,ZESTRIL) tablet 10 mg, 10 mg, Oral, Q24H, Colin Yun MD, 10 mg at 01/18/22 0835  •  magnesium hydroxide (MILK OF MAGNESIA) suspension 10 mL, 10 mL, Oral, Daily PRN, Leyda Rodriguez APRN  •  melatonin tablet 3 mg, 3 mg, Oral, Nightly PRN, Leyda Rodriguez APRN, 3 mg at 01/10/22 0120  •  metoprolol succinate XL (TOPROL-XL)  24 hr tablet 25 mg, 25 mg, Oral, Q24H, Colin Yun MD, 25 mg at 01/18/22 0835  •  morphine injection 2 mg, 2 mg, Intravenous, Q4H PRN, Leyda Rodriguez APRN, 2 mg at 01/11/22 2317  •  nitroglycerin (NITROSTAT) SL tablet 0.4 mg, 0.4 mg, Sublingual, Q5 Min PRN, Leyda Rodriguez APRN  •  ondansetron (ZOFRAN) tablet 4 mg, 4 mg, Oral, Q6H PRN **OR** ondansetron (ZOFRAN) injection 4 mg, 4 mg, Intravenous, Q6H PRN, Leyda Rodriguez APRN  •  oxyCODONE (ROXICODONE) immediate release tablet 5 mg, 5 mg, Oral, Q4H PRN, Leyda Rodriguez APRN  •  Pharmacy to dose warfarin, , Does not apply, Continuous PRN, Yumiko Flores APRN  •  polyethylene glycol (MIRALAX) packet 17 g, 17 g, Oral, Daily, Leyda Rodriguez APRN, 17 g at 01/15/22 0808  •  sennosides-docusate (PERICOLACE) 8.6-50 MG per tablet 2 tablet, 2 tablet, Oral, Nightly, Leyda Rodriguez APRN, 2 tablet at 01/14/22 2045  •  [COMPLETED] Insert peripheral IV, , , Once **AND** sodium chloride 0.9 % flush 10 mL, 10 mL, Intravenous, PRN, Leyda Rodriguez APRN  •  warfarin (COUMADIN) tablet 5 mg, 5 mg, Oral, Once per day on Wed Sat, Yumiko Flores APRN  •  warfarin (COUMADIN) tablet 7.5 mg, 7.5 mg, Oral, Once per day on Sun Mon Tue Thu Fri, Yumiko Flores APRN 7.5 mg at 01/18/22 1720      Objective     Vital Sign Min/Max for last 24 hours  Temp  Min: 97.6 °F (36.4 °C)  Max: 98.3 °F (36.8 °C)   BP  Min: 112/70  Max: 141/78    Pulse  Min: 64  Max: 71     Wt Readings from Last 3 Encounters:   01/19/22 98.3 kg (216 lb 11.4 oz)        Intake/Output Summary (Last 24 hours) at 1/19/2022 0813  Last data filed at 1/19/2022 0300  Gross per 24 hour   Intake 340 ml   Output 600 ml   Net -260 ml     Physical Exam:      General Appearance:    Well developed and well nourished in no acute distress   Head:    Normocephalic, atraumatic   Eyes:            Conjunctivae normal, anicteric, no xanthelasma   Neck:   no carotid bruit, no JVD   Lungs:    Fairly clear on the left.  He is moving  better air right base than he was when I listen to him last Friday.  Still has some rhonchi and some decrease in breath sounds.     Heart:    Regular rate and rhythm.  Normal S1, mechanical  aortic valve sounds are good.  2/6 systolic murmur, no gallop, rub or lift.    Chest Wall:    No abnormalities observed   Abdomen:     Normal bowel sounds, no masses, no organomegaly, soft        nontender, nondistended, no guarding, no rebound                tenderness   Rectal:     Deferred   Extremities:   No clubbing, cyanosis or edema.     Pulses:   Pulses palpable and equal bilaterally   Skin:   No bleeding, bruising or rash   Neurologic:   awake alert and oriented x3, speech clear and approp, no facial drooping      Monitor:  nsr  Results Review:     I reviewed the patient's new clinical results.    Sodium Sodium   Date Value Ref Range Status   01/19/2022 137 136 - 145 mmol/L Final   01/18/2022 136 136 - 145 mmol/L Final   01/17/2022 135 (L) 136 - 145 mmol/L Final      Potassium Potassium   Date Value Ref Range Status   01/19/2022 4.4 3.5 - 5.2 mmol/L Final   01/18/2022 4.1 3.5 - 5.2 mmol/L Final   01/17/2022 4.3 3.5 - 5.2 mmol/L Final     Comment:     Slight hemolysis detected by analyzer. Results may be affected.      Chloride Chloride   Date Value Ref Range Status   01/19/2022 102 98 - 107 mmol/L Final   01/18/2022 100 98 - 107 mmol/L Final   01/17/2022 100 98 - 107 mmol/L Final      Bicarbonate No results found for: PLASMABICARB   BUN BUN   Date Value Ref Range Status   01/19/2022 10 8 - 23 mg/dL Final   01/18/2022 10 8 - 23 mg/dL Final   01/17/2022 11 8 - 23 mg/dL Final      Creatinine Creatinine   Date Value Ref Range Status   01/19/2022 0.64 (L) 0.76 - 1.27 mg/dL Final   01/18/2022 0.64 (L) 0.76 - 1.27 mg/dL Final   01/17/2022 0.62 (L) 0.76 - 1.27 mg/dL Final      Calcium Calcium   Date Value Ref Range Status   01/19/2022 8.7 8.6 - 10.5 mg/dL Final   01/18/2022 8.8 8.6 - 10.5 mg/dL Final   01/17/2022 8.4 (L) 8.6 -  10.5 mg/dL Final      Magnesium No results found for: MG     Results from last 7 days   Lab Units 01/19/22  0450   WBC 10*3/mm3 5.83   HEMOGLOBIN g/dL 11.2*   HEMATOCRIT % 34.0*   PLATELETS 10*3/mm3 270     Lab Results   Lab Value Date/Time    TROPONINT <0.010 01/10/2022 0257     No results found for: CHOL  No results found for: HDL  No results found for: LDL  No results found for: TRIG  No components found for: CHOLHDL    Results from last 7 days   Lab Units 01/19/22  0450 01/18/22  1332 01/18/22  0906   INR  1.17* 1.18* 1.16*         Echo EF Estimated  No results found for: ECHOEFEST       Assessment/ Plan  1. Hemothorax; code on 1/9/2022.  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 right-sided pleural effusion        -s/p R thoracentesis 1/3/21:500cc of bloody fluid removed  4. HTN  Plan #1 blood pressure heart rhythm and rate are good.  #2 hemoglobin stable.  3 INR only 1.17 today.  He looks okay for discharge cardiac standpoint.  I scheduled him for an INR in our office on Friday, 1/21/2022.  The office  will call me the result and if its not increased much may give a little extra warfarin then and decide then when to recheck another INR.  Stop heparin today.  Risk of outpatient Lovenox might be greater than the benefit in this situation.  #4 continue his regular outpatient cardiac follow-up with Dr. Leyva in our group.    Colin Yun MD  01/19/22  08:13 EST      Time: 18 minutes

## 2022-01-19 NOTE — DISCHARGE SUMMARY
Patient Name: Claudio Krishna  : 1955  MRN: [id number]    Date of Admission: 2022  Date of Discharge:  2022  Primary Care Physician: Shayan Celeste MD      Chief Complaint:   Abdominal Pain (large amount of bruising to right side of abd)      Discharge Diagnoses     Active Hospital Problems    Diagnosis  POA   • **Hemothorax [J94.2]  Unknown   • Cardiac arrest (HCC) [I46.9]  Unknown   • Mechanical heart valve present [Z95.2]  Not Applicable   • Essential hypertension [I10]  Unknown   • Abdominal wall hematoma [S30.1XXA]  Yes      Resolved Hospital Problems   No resolved problems to display.        Hospital Course     Hemothorax/status post PEA arreast  Cardiothoracic surgery consulted. Patient initially has underwent a thoracentesis with 500 mL off  -was doing d well initially, and was restarted on heparin drip with warfarin bridge.  However had PEA arrest; brief.  Resuscitated successfully.Head 1/10 s/p emergent Rt CT 1/10  S/p VATS . Transferred back to the floor .  Posterior and anterior chest tubes were removed, patient remained stable.  Follow-up chest x-ray persistent tiny right apical pneumothorax unchanged from prior, and thereremains some persistent pleural fluid and atelectasis at the right base and slight volume loss that is unchanged.  Follow-up with cardiothoracic surgery in 2 weeks as scheduled.    #Large abdominal wall hematoma/hemothorax  -general Surgery consulted, no indications for interventions.   Hematoma was slowly improving.  Will take several weeks for hematoma to completely reabsorb.     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 Discharged on Coumadin only, without bridge due prior hemothorax which resulted in PEA arrest.  Held aspirin at discharge per cardiology recommendations.  Patient has an outpatient INR scheduled in couple days.  Follow-up  with primary cardiology scheduled.       #Essential hypertension  -Continue lisinopril, metoprolol (decreased dose to 25 mg XL daily)    At the time of discharge patient was told to take all medications as prescribed, keep all follow-up appointments, and call their doctor or return to the hospital with any worsening or concerning symptoms.       Day of Discharge     Subjective:  Patient seen this morning.  Chest tube was removed yesterday.  Denies any chest pain, shortness of breath..  Has been ambulating around without difficulties.    Physical Exam:  Temp:  [97.6 °F (36.4 °C)-98.3 °F (36.8 °C)] 97.6 °F (36.4 °C)  Heart Rate:  [64-71] 64  Resp:  [16] 16  BP: (112-141)/(70-78) 130/72  Body mass index is 31.09 kg/m².  Physical Exam  Constitutional:       Appearance: Normal appearance,  HENT:      Head: Normocephalic and atraumatic.   Cardiovascular:      Rate and Rhythm: Normal rate and regular rhythm.      Heart sounds Murmur heard.   Pulmonary:      Breath sounds: decreased breath sounds right lower quadrant.,  Otherwise clear to auscultation, no wheezing or rales.  Nonlabored breathing.  Abdominal:      General: Bowel sounds are normal. There is no distension.      Palpations: Abdomen is soft.      Tenderness: There is no abdominal tenderness.      Comments:  bruising to his abdomen, worse on right side and extends to his right flank (significantly improved compared to admission.)  Skin:     General: Skin is warm and dry.   Neurological:      Mental Status: He is alert and oriented to person, place, and time. Mental status is at baseline.   Psychiatric:         Mood and Affect: Mood normal.         Behavior: Behavior normal.   Consultants     Consult Orders (all) (From admission, onward)     Start     Ordered    01/10/22 0601  Inpatient Thoracic Surgery Consult  STAT        Specialty:  Thoracic Surgery  Provider:  Sol Andrews MD    01/10/22 0602    01/02/22 1208  Inpatient Cardiology Consult  Once         Specialty:  Cardiology  Provider:  Chad Leyva MD    01/02/22 1208    01/02/22 1116  Inpatient General Surgery Consult  Once        Specialty:  General Surgery  Provider:  Jarvis Rodriguez Jr., MD    01/02/22 1116    01/02/22 1113  Inpatient Cardiology Consult  Once        Specialty:  Cardiology  Provider:  Konstantin Berry III, MD    01/02/22 1113    01/02/22 0000  Inpatient Case Management  Consult  Once        Provider:  (Not yet assigned)    01/01/22 2146    01/01/22 1718  Inpatient Thoracic Surgery Consult  Once        Specialty:  Thoracic Surgery  Provider:  Vishal Crowe III, MD    01/01/22 1717    01/01/22 1653  [facility] (on-call MD unless specified) Details  Once        Specialty:  Hospitalist  Provider:  (Not yet assigned)    01/01/22 1652              Procedures     BRONCHOSCOPY, RIGHT THORACOSCOPY VIDEO ASSISTED WITH COMPLETE DECORTICATION      Imaging Results (All)     Procedure Component Value Units Date/Time    XR Chest 1 View [[id number]] Collected: 01/19/22 0725     Updated: 01/19/22 0730    Narrative:      ONE VIEW PORTABLE CHEST AT 6:13 AM     HISTORY: Recent right chest surgery. Chest tube removal.     FINDINGS: There is a persistent tiny right apical pneumothorax measuring  6 mm in thickness and showing no change from yesterday's exam. There  remains some persistent pleural fluid and atelectasis at the right base  and slight volume loss that is unchanged. The heart remains mildly  enlarged.     This report was finalized on 1/19/2022 7:27 AM by Dr. Ang Cool M.D.       XR Chest 1 View [[id number]] Collected: 01/18/22 1259     Updated: 01/18/22 1303    Narrative:      ONE VIEW PORTABLE CHEST AT 5:28 AM     HISTORY: Recent right chest surgery. Chest tube removal.     FINDINGS: The right-sided chest tube is been removed and there is a tiny  right apical pneumothorax measuring 7 mm that is unchanged from  yesterday's exam. There is some persistent pleural fluid and  atelectasis  at the right base unchanged. The heart remains mildly enlarged with  sternal wires from previous cardiac surgery.     This report was finalized on 1/18/2022 1:00 PM by Dr. Ang Cool M.D.       XR Chest 1 View [[id number]] Collected: 01/17/22 0802     Updated: 01/17/22 0808    Narrative:      XR CHEST 1 VW-     Clinical: Chest tube management     COMPARISON 1/16/2022     FINDINGS: Right apical pneumothorax similar to or slightly more  pronounced compared to the previous examination. Estimated size 5% or  less. There is a right-sided chest tube in stable position with  subcutaneous emphysema at the chest tube insertion site. There is  atelectasis demonstrated at the right lung base with perhaps a small  amount of pleural thickening or effusion again blunting the costophrenic  sulcus. Cardiomediastinal silhouette is stable. No vascular congestion  is demonstrated. The remainder is unremarkable.     This report was finalized on 1/17/2022 8:05 AM by Dr. Sunday Read M.D.       XR Chest 1 View [[id number]] Collected: 01/16/22 0659     Updated: 01/16/22 0704    Narrative:      XR CHEST 1 VW-     HISTORY: Male who is 66 years-old,  chest tube removal     TECHNIQUE: Frontal view of the chest     COMPARISON: 01/15/2022     FINDINGS: 1 right chest tube has been removed. One right chest tube  remains. The heart is enlarged.  Sternotomy wires, cardiac valve marker noted. Pulmonary vasculature is  unremarkable. Minimal atelectasis or infiltrate at the right base. Right  apical pneumothorax appears slightly decreased, 5 mm, previously 7 mm.  Small right pleural effusion. No left pneumothorax. Subcutaneous  emphysema is seen at the right chest wall, similar to prior exam, to  extent partly included. No acute osseous process.       Impression:      Removal of one chest tube. Small right apical pneumothorax measures  slightly smaller.     This report was finalized on 1/16/2022 7:01 AM by Dr. Michael SANCHEZ  JAJA Smith       XR Chest 1 View [[id number]] Collected: 01/15/22 0714     Updated: 01/15/22 0719    Narrative:      XR CHEST 1 VW-     HISTORY: Male who is 66 years-old,  chest tubes     TECHNIQUE: Frontal view of the chest     COMPARISON: 01/14/2022     FINDINGS: Right chest tubes appear stable. The heart is enlarged.  Sternotomy wires, cardiac valve marker noted. Pulmonary vasculature is  unremarkable. Minimal atelectasis or infiltrate at the right base. Right  apical pneumothorax measures 7 mm. Small right pleural effusion. No left  pneumothorax. Subcutaneous emphysema is seen at the right chest wall,  similar to prior exam. No acute osseous process.       Impression:      Small right apical pneumothorax, right chest tubes in place.     This report was finalized on 1/15/2022 7:16 AM by Dr. Michael Smith M.D.       XR Chest 1 View [[id number]] Collected: 01/14/22 0503     Updated: 01/14/22 0507    Narrative:      SINGLE VIEW OF THE CHEST     HISTORY: Hemothorax     COMPARISON: 01/13/2022     FINDINGS:  Cardiomegaly is present. Right-sided chest tubes remain in place. Right  basilar consolidation and right pleural effusion are again noted. No  pneumothorax is seen. There is some persistent subcutaneous air in the  right lateral chest wall. There is left basilar atelectasis.       Impression:      No significant interval change.     This report was finalized on 1/14/2022 5:04 AM by Dr. Winifred Woods M.D.       XR Chest 1 View [[id number]] Collected: 01/12/22 1202     Updated: 01/13/22 1314    Narrative:      CHEST SINGLE VIEW     HISTORY: Postop bronchoscopy with right thoracoscopy and decortication.     COMPARISON: AP chest 01/12/2022, 01/11/2022, 01/10/2022.     FINDINGS: There are postsurgical changes in the right and there has been  removal or repositioning of a right chest tube. There are now 2 chest  tubes extending toward the right apex. Right pleural effusion is present  and there is right  basilar atelectasis and postoperative change with  volume loss and elevation of the right hemidiaphragm. No definite  pneumothorax is evident. There is air within the right lateral chest  wall subcutaneous fat. Left lung appears clear. Cardiomediastinal  silhouette is not changed. Median sternotomy wires are present.       Impression:      Postoperative changes in the right. There are now 2 right  chest tubes extending to the right apex and there is a right pleural  effusion with right basilar atelectasis and postoperative opacity.  Subcutaneous emphysema right lateral chest wall.     This report was finalized on 1/13/2022 1:11 PM by Dr. Cb Mejia M.D.       XR Chest 1 View [[id number]] Collected: 01/13/22 0504     Updated: 01/13/22 0509    Narrative:      SINGLE VIEW OF THE CHEST     HISTORY: Postop     COMPARISON: 01/12/2022     FINDINGS:  Right-sided chest tubes are again noted. No definite pneumothorax is  seen. Subcutaneous emphysema has decreased. There is right basilar  consolidation and right pleural effusion. There is some left basilar  atelectasis. Cardiac silhouette is stable.       Impression:      Persistent right basilar consolidation and right pleural effusion. No  definite pneumothorax seen.     This report was finalized on 1/13/2022 5:06 AM by Dr. Winifred Woods M.D.       XR Chest 1 View [[id number]] Collected: 01/12/22 0526     Updated: 01/12/22 0531    Narrative:      VIEW OF THE CHEST     HISTORY: Hemothorax     COMPARISON: 01/11/2022     FINDINGS:  Right-sided chest tube remains in place. No pneumothorax is identified.  Right basilar consolidation and right pleural effusion are unchanged.  Aeration at the left lung base appears improved       Impression:      Interval improvement in aeration at the left lung base.     This report was finalized on 1/12/2022 5:28 AM by Dr. Winifred Woods M.D.       XR Chest 1 View [[id number]] Collected: 01/11/22 0501     Updated: 01/11/22 0506     Narrative:      SINGLE VIEW OF THE CHEST     HISTORY: Pneumothorax     COMPARISON: 06/10/2022     FINDINGS:  Cardiomegaly is present. There is no vascular congestion. Right-sided  chest tube remains in place. No pneumothorax is seen. There is  persistent right pleural effusion, not significantly changed in size  when compared to prior exam. There is bibasilar consolidation. No  definite effusion is seen on the left.       Impression:      No significant interval change.     This report was finalized on 1/11/2022 5:03 AM by Dr. Winifred Woods M.D.       XR Chest 1 View [[id number]] Collected: 01/10/22 1533     Updated: 01/10/22 1539    Narrative:      XR CHEST 1 VW-     HISTORY: Male who is 66 years-old,  chest tube, hemothorax     TECHNIQUE: Frontal view of the chest     COMPARISON: 01/10/2022     FINDINGS: Stable appearing right chest tube. Heart, mediastinum and  pulmonary vasculature are unremarkable. Persistent right pleural  effusion and basilar likely atelectasis, appears similar to prior exam.  No pneumothorax. No acute osseous process.       Impression:      No significant change.     This report was finalized on 1/10/2022 3:36 PM by Dr. Michael Smith M.D.       XR Chest 1 View [[id number]] Collected: 01/10/22 0803     Updated: 01/10/22 0809    Narrative:      PORTABLE CHEST X-RAY     HISTORY: Chest tube management.     TECHNIQUE: Portable chest x-ray is provided and correlated with multiple  recent prior chest x-rays and CT scans.     FINDINGS: There are sternal wires and new external cardiac pacing pads  as well as a new right chest tube. The right hemothorax is significantly  smaller in volume, with residual pleural fluid filling the caudad 25-30%  of the volume of the right hemithorax. There is no midline shift or  pneumothorax. The left costophrenic sulcus is dry. Vascular volume is  normal.       Impression:      New right chest tube with diminished volume of the  previously seen right pleural  effusion.     This report was finalized on 1/10/2022 8:05 AM by Dr. Randy Singh M.D.       CT Chest Without Contrast Diagnostic [[id number]] Collected: 01/10/22 0508     Updated: 01/10/22 0528    Narrative:      CT OF THE CHEST WITHOUT CONTRAST; CT OF THE ABDOMEN AND PELVIS WITHOUT  CONTRAST     HISTORY: Worsening hemothorax     COMPARISON: 01/01/2022     TECHNIQUE: Axial CT imaging was obtained from the thoracic inlet to the  symphysis pubis. No IV contrast was administered.     FINDINGS:  CT OF THE CHEST: Previously identified loculated right pleural effusion  has significantly increased in size. Multifocal areas of increased  attenuation are seen within it, as this is for areas of clot. The amount  of higher attenuation material has increased when compared to the prior  study. There is some mediastinal shift to the left, as well as some  narrowing of the right mainstem bronchus and compared to prior study.  There is some patchy consolidation within the right upper lobe, as well  as significant atelectasis of the right lower lobe and right middle  lobe. Thyroid gland and esophagus are within normal limits. The patient  is status post aortic valve replacement. There are coronary artery  calcifications. Thoracic aorta is normal in caliber. There is no  evidence of dissection. There is some mild atelectasis noted on the  left. No acute rib fractures are seen.     CT OF THE ABDOMEN AND PELVIS: On prior study, patient was noted to have  a large amount of hemorrhage into the right lateral abdominal wall.  Similar findings are present on the current study. This hemorrhage  appears mildly improved when compared to prior study. No focal hepatic  lesions are seen. Stomach, duodenum, adrenal glands, and spleen appear  unremarkable. Pancreas is atrophic. Gallbladder is within normal limits.  No hydronephrosis is seen on either side. There is a left renal cyst.  There is calcification of the aorta. There is no evidence of  bowel  obstruction. Urinary bladder and prostate gland are within normal  limits. Patient does have increased stool burden within the colon, which  may reflect some constipation. There is diverticulosis. There is a  fat-containing umbilical hernia. There is no evidence of appendicitis.  No acute osseous abnormalities are seen.       Impression:         1. Previously identified right-sided hemothorax has increased  significantly in size. Areas of acute appearing hemorrhage within it  have also increased. It is associated with some mediastinal shift to the  left. There is mass effect upon the right mainstem bronchus. There is  significant atelectasis throughout the right lung. There is some  consolidation within the right upper lobe, and correlation with any  evidence of pneumonia is recommended.  2. On prior exam, the patient was noted to have extensive hemorrhage  throughout the right lateral abdominal wall. This has improved when  compared to the prior study. No intra-abdominal hemorrhage is seen.     Radiation dose reduction techniques were utilized, including automated  exposure control and exposure modulation based on body size.     This report was finalized on 1/10/2022 5:25 AM by Dr. Winifred Woods M.D.       CT Abdomen Pelvis Without Contrast [[id number]] Collected: 01/10/22 0508     Updated: 01/10/22 0528    Narrative:      CT OF THE CHEST WITHOUT CONTRAST; CT OF THE ABDOMEN AND PELVIS WITHOUT  CONTRAST     HISTORY: Worsening hemothorax     COMPARISON: 01/01/2022     TECHNIQUE: Axial CT imaging was obtained from the thoracic inlet to the  symphysis pubis. No IV contrast was administered.     FINDINGS:  CT OF THE CHEST: Previously identified loculated right pleural effusion  has significantly increased in size. Multifocal areas of increased  attenuation are seen within it, as this is for areas of clot. The amount  of higher attenuation material has increased when compared to the prior  study. There is some  mediastinal shift to the left, as well as some  narrowing of the right mainstem bronchus and compared to prior study.  There is some patchy consolidation within the right upper lobe, as well  as significant atelectasis of the right lower lobe and right middle  lobe. Thyroid gland and esophagus are within normal limits. The patient  is status post aortic valve replacement. There are coronary artery  calcifications. Thoracic aorta is normal in caliber. There is no  evidence of dissection. There is some mild atelectasis noted on the  left. No acute rib fractures are seen.     CT OF THE ABDOMEN AND PELVIS: On prior study, patient was noted to have  a large amount of hemorrhage into the right lateral abdominal wall.  Similar findings are present on the current study. This hemorrhage  appears mildly improved when compared to prior study. No focal hepatic  lesions are seen. Stomach, duodenum, adrenal glands, and spleen appear  unremarkable. Pancreas is atrophic. Gallbladder is within normal limits.  No hydronephrosis is seen on either side. There is a left renal cyst.  There is calcification of the aorta. There is no evidence of bowel  obstruction. Urinary bladder and prostate gland are within normal  limits. Patient does have increased stool burden within the colon, which  may reflect some constipation. There is diverticulosis. There is a  fat-containing umbilical hernia. There is no evidence of appendicitis.  No acute osseous abnormalities are seen.       Impression:         1. Previously identified right-sided hemothorax has increased  significantly in size. Areas of acute appearing hemorrhage within it  have also increased. It is associated with some mediastinal shift to the  left. There is mass effect upon the right mainstem bronchus. There is  significant atelectasis throughout the right lung. There is some  consolidation within the right upper lobe, and correlation with any  evidence of pneumonia is recommended.  2.  On prior exam, the patient was noted to have extensive hemorrhage  throughout the right lateral abdominal wall. This has improved when  compared to the prior study. No intra-abdominal hemorrhage is seen.     Radiation dose reduction techniques were utilized, including automated  exposure control and exposure modulation based on body size.     This report was finalized on 1/10/2022 5:25 AM by Dr. Winifred Woods M.D.       XR Chest 1 View [[id number]] Collected: 01/10/22 0326     Updated: 01/10/22 0334    Narrative:      SINGLE VIEW OF THE CHEST     HISTORY: Chest tightness and shortness of air     COMPARISON: June 2022     FINDINGS:  There is been significant worsening of opacification of the right  hemithorax when compared to prior study. There is some mild deviation of  trachea to the left, suggesting that this is due to increasing pleural  fluid, which appears to be loculated. There may also be some increasing  atelectasis at the right lung base. Left lung remains clear. No  pneumothorax is seen.       Impression:      Near complete opacification of the right hemithorax, favored to be  secondary to a large, likely loculated right pleural effusion. I cannot  exclude some increasing atelectasis at the right lung base.     FINDINGS were called to the patient's nurse at 3:29 AM.     This report was finalized on 1/10/2022 3:31 AM by Dr. Winifred Woods M.D.       XR Chest 1 View [[id number]] Collected: 01/05/22 0826     Updated: 01/05/22 0927    Narrative:      ONE VIEW PORTABLE CHEST     HISTORY: Follow-up of right hemothorax.     FINDINGS: There is a small-to-moderate right pleural effusion with some  adjacent atelectasis and the effusion shows minimal enlargement since  yesterday's exam. There is no pneumothorax following thoracentesis. The  left lung is clear. The heart remains mildly enlarged with sternal wires  from previous cardiac surgery.     This report was finalized on 1/5/2022 9:24 AM by Dr. Fry  JAJA Cool       XR Chest 1 View [[id number]] Collected: 01/04/22 0749     Updated: 01/04/22 0749    Narrative:        Patient: PLACIDO CORADO  Time Out: 07:48  Exam(s): FILM CXR 1 VIEW     EXAM:    XR Chest, 1 View    CLINICAL HISTORY:     Reason for exam: post-thoracentesis, hemothorax.    TECHNIQUE:    Frontal view of the chest.    COMPARISON:  1 1 2022    FINDINGS:    Lungs: Right base atelectasis.  No consolidation.    Pleural space:  Small right pleural effusion.  No pneumothorax.    Heart:  Unremarkable.  No cardiomegaly.    Mediastinum: Median sternotomy wires in place.    Bones joints:  Unremarkable.    IMPRESSION:       Small right pleural effusion and right base atelectasis.  No pneumothorax.      Impression:          Electronically signed by Chriss Leonardo M.D. on 01-04-22 at 0748     Thoracentesis [[id number]] Collected: 01/03/22 1243    Specimen: Body Fluid Updated: 01/03/22 1247    Narrative:      ULTRASOUND-GUIDED RIGHT THORACENTESIS.     HISTORY: Pleural effusion.     After signed informed consent was obtained the patient was prepped and  draped in the usual sterile fashion. Lidocaine was used for local  anesthesia.     Ultrasound guidance was used to place a 5 Armenian Yueh catheter into the  right pleural fluid collection. 500 mL of bloody fluid was removed.  Sample was sent to the lab.     Confirmatory images were obtained.     Patient tolerated the procedure well with no complications.       Impression:      Ultrasound-guided right thoracentesis as described.        This report was finalized on 1/3/2022 12:43 PM by Dr. Les Kennedy M.D.       CT Abdomen Pelvis With Contrast [[id number]] Collected: 01/01/22 1640     Updated: 01/01/22 1742    Narrative:      CT CHEST WO CONTRAST DIAGNOSTIC-, CT ABDOMEN PELVIS W CONTRAST-     Radiation dose reduction techniques were utilized, including automated  exposure control and exposure modulation based on body size.     CLINICAL: Right abdominal wall  ecchymosis, cough, patient  anticoagulated. Evaluate for hematoma.     COMPARISON: None.     FINDINGS: Located within the subcutaneous fat of the right abdominal  wall laterally, there is a 25 x 15 x 4 cm curvilinear density with  surrounding subcutaneous fat edema. This is just superficial to the  abdominal wall musculature and consistent with abdominal wall hematoma  and edema.     There is a complex pleural effusion demonstrated at the right lung base  with areas of increased density, likely blood. The appearance consistent  with hemothorax. Fluid tracks into the major fissure. It is small in  size. There is a curvilinear area of consolidation or atelectasis  involving the right lower lobe. No rib fracture. No acute osseous  abnormality seen.     Calcified benign granuloma in the left lower lobe with associated  calcified benign left hilar lymph nodes. The left lung is otherwise  clear.     There is cardiac enlargement no pericardial effusion. Prior aortic valve  repair. Esophagus is satisfactory in course and caliber. Diameter of the  aorta is within normal limits, there is atherosclerotic plaque  formation. There are median sternotomy wires in position. Nominal  coronary artery calcification.     The liver, gallbladder, pancreas, spleen, adrenal glands and right  kidney are normal. No biliary duct dilatation. Stomach is collapsed,  contour within normal limits.     The left kidney is normal with the exception of a 3.8 cm renal cysts. No  renal calculus or obstructive uropathy on the right or left. The ureters  are normal in course and caliber to the urinary bladder which is typical  in appearance.     Prostate and seminal vesicles unremarkable.     There is diverticulosis of the colon, no diverticulitis. The small bowel  is satisfactory in appearance. Diameter of the aorta is normal. No  abdominal or pelvic adenopathy seen.     There is a periumbilical hernia containing only mesenteric fat.     Findings of this  report called to Dr. Barnhart at the time of completion,  4:30 PM.     CONCLUSION:  1. Curvilinear density within the right lateral abdominal wall  consistent with hematoma.  2. Small right-sided pleural effusion with high density areas within,  consistent with hemothorax. There is an area of  atelectasis/consolidation at the right lung base, potential pneumonia.  3. Cardiomegaly.  4. Diverticulosis of the colon.  5. Periumbilical hernia.        This report was finalized on 1/1/2022 5:39 PM by Dr. Sunday Read M.D.       CT Chest Without Contrast Diagnostic [[id number]] Collected: 01/01/22 1640     Updated: 01/01/22 1742    Narrative:      CT CHEST WO CONTRAST DIAGNOSTIC-, CT ABDOMEN PELVIS W CONTRAST-     Radiation dose reduction techniques were utilized, including automated  exposure control and exposure modulation based on body size.     CLINICAL: Right abdominal wall ecchymosis, cough, patient  anticoagulated. Evaluate for hematoma.     COMPARISON: None.     FINDINGS: Located within the subcutaneous fat of the right abdominal  wall laterally, there is a 25 x 15 x 4 cm curvilinear density with  surrounding subcutaneous fat edema. This is just superficial to the  abdominal wall musculature and consistent with abdominal wall hematoma  and edema.     There is a complex pleural effusion demonstrated at the right lung base  with areas of increased density, likely blood. The appearance consistent  with hemothorax. Fluid tracks into the major fissure. It is small in  size. There is a curvilinear area of consolidation or atelectasis  involving the right lower lobe. No rib fracture. No acute osseous  abnormality seen.     Calcified benign granuloma in the left lower lobe with associated  calcified benign left hilar lymph nodes. The left lung is otherwise  clear.     There is cardiac enlargement no pericardial effusion. Prior aortic valve  repair. Esophagus is satisfactory in course and caliber. Diameter of the  aorta is  within normal limits, there is atherosclerotic plaque  formation. There are median sternotomy wires in position. Nominal  coronary artery calcification.     The liver, gallbladder, pancreas, spleen, adrenal glands and right  kidney are normal. No biliary duct dilatation. Stomach is collapsed,  contour within normal limits.     The left kidney is normal with the exception of a 3.8 cm renal cysts. No  renal calculus or obstructive uropathy on the right or left. The ureters  are normal in course and caliber to the urinary bladder which is typical  in appearance.     Prostate and seminal vesicles unremarkable.     There is diverticulosis of the colon, no diverticulitis. The small bowel  is satisfactory in appearance. Diameter of the aorta is normal. No  abdominal or pelvic adenopathy seen.     There is a periumbilical hernia containing only mesenteric fat.     Findings of this report called to Dr. Barnhart at the time of completion,  4:30 PM.     CONCLUSION:  1. Curvilinear density within the right lateral abdominal wall  consistent with hematoma.  2. Small right-sided pleural effusion with high density areas within,  consistent with hemothorax. There is an area of  atelectasis/consolidation at the right lung base, potential pneumonia.  3. Cardiomegaly.  4. Diverticulosis of the colon.  5. Periumbilical hernia.        This report was finalized on 1/1/2022 5:39 PM by Dr. Sunday Read M.D.               Pertinent Labs     Results from last 7 days   Lab Units 01/19/22  0450 01/18/22  1911 01/18/22  0906 01/17/22  1748 01/17/22  0439 01/17/22  0439 01/16/22  1733 01/16/22  0328   WBC 10*3/mm3 5.83  --  6.85  --   --  5.80  --  6.15   HEMOGLOBIN g/dL 11.2* 10.7* 11.7* 11.9*   < > 11.6*   < > 11.4*   PLATELETS 10*3/mm3 270  --  285  --   --  291  --  254    < > = values in this interval not displayed.     Results from last 7 days   Lab Units 01/19/22  0450 01/18/22  0906 01/17/22  0439 01/16/22  0328   SODIUM mmol/L 137 136  135* 138   POTASSIUM mmol/L 4.4 4.1 4.3 5.0   CHLORIDE mmol/L 102 100 100 103   CO2 mmol/L 25.3 26.2 25.9 29.6*   BUN mg/dL 10 10 11 10   CREATININE mg/dL 0.64* 0.64* 0.62* 0.67*   GLUCOSE mg/dL 101* 143* 106* 116*   Estimated Creatinine Clearance: 106.8 mL/min (A) (by C-G formula based on SCr of 0.64 mg/dL (L)).    Results from last 7 days   Lab Units 01/19/22  0450 01/18/22  0906 01/17/22  0439 01/16/22  0328   CALCIUM mg/dL 8.7 8.8 8.4* 8.7               Invalid input(s): LDLCALC          Test Results Pending at Discharge       Discharge Details        Discharge Medications      New Medications      Instructions Start Date   docusate sodium 100 MG capsule   100 mg, Oral, 2 Times Daily, Hold for loose stools.      oxyCODONE 5 MG immediate release tablet  Commonly known as: ROXICODONE   Take 1 tablet by mouth Every 4 (Four) Hours As Needed for Moderate Pain.      sennosides-docusate 8.6-50 MG per tablet  Commonly known as: PERICOLACE   2 tablets, Oral, Nightly, Hold for loose stools.         Changes to Medications      Instructions Start Date   benzonatate 100 MG capsule  Commonly known as: TESSALON  What changed:   · when to take this  · reasons to take this   100 mg, Oral, 3 Times Daily PRN      metoprolol succinate XL 25 MG 24 hr tablet  Commonly known as: TOPROL-XL  What changed:   · medication strength  · how much to take   25 mg, Oral, Daily         Continue These Medications      Instructions Start Date   lisinopril 10 MG tablet  Commonly known as: PRINIVIL,ZESTRIL   10 mg, Oral, Daily      lovastatin 40 MG tablet  Commonly known as: MEVACOR   40 mg, Oral, Nightly      warfarin 5 MG tablet  Commonly known as: COUMADIN   5 mg, Oral, Daily Warfarin, 5mg WedSat, 7.5mg all other days         Stop These Medications    amoxicillin-clavulanate 875-125 MG per tablet  Commonly known as: AUGMENTIN     aspirin 81 MG chewable tablet     OSTEO BI-FLEX/5-LOXIN ADVANCED PO     Zithromax Z-Ben 250 MG tablet  Generic drug:  azithromycin            No Known Allergies    Discharge Disposition:  Home or Self Care      Discharge Diet:  Diet Order   Procedures   • Diet Regular; Thin       Discharge Activity:       CODE STATUS:    Code Status and Medical Interventions:   Ordered at: 01/12/22 1241     Code Status (Patient has no pulse and is not breathing):    CPR (Attempt to Resuscitate)     Medical Interventions (Patient has pulse or is breathing):    Full Support       Future Appointments   Date Time Provider Department Center   2/9/2022 11:15 AM Leyda Rodriguez APRN [facility]     Additional Instructions for the Follow-ups that You Need to Schedule     XR Chest 2 View    Jan 22, 2022 (Approximate)      Exam reason: post-op VATS         CBC (No Diff)    Jan 24, 2022 (Approximate)      Release to patient: Immediate            Follow-up Information     Sol Andrews MD. Go on 2/9/2022.    Specialty: Thoracic Surgery  Why: Please go to the radiology department in the main hospital (Entrance A) for a chest x-ray at 10:30AM, then come to our office for your appointment.  Contact information:  [address] [id number] [phone]             Shayan Celeste MD Follow up in 1 week(s).    Specialty: Family Medicine  Contact information:  [address] [facility] [id number] [phone]             Cardiology Follow up.    Why: Follow-up with Dr. Leyva as scheduled.                       Additional Instructions for the Follow-ups that You Need to Schedule     XR Chest 2 View    Jan 22, 2022 (Approximate)      Exam reason: post-op VATS         CBC (No Diff)    Jan 24, 2022 (Approximate)      Release to patient: Immediate           Time Spent on Discharge:  Greater than 30 minutes      Taniya Nicholas MD  [facility]  01/19/22  10:16 EST

## 2022-01-19 NOTE — PROGRESS NOTES
[facility] Clinical Pharmacy Services: Warfarin Dosing/Monitoring Consult    Claudio Krishna is a 66 y.o. male, estimated creatinine clearance is 106.8 mL/min (A) (by C-G formula based on SCr of 0.64 mg/dL (L)). weighing 98.3 kg (216 lb 11.4 oz).    Results from last 7 days   Lab Units 01/19/22  0450 01/18/22  1911 01/18/22  1332 01/18/22  0906 01/17/22  1748 01/17/22  0439 01/16/22  1733 01/16/22  0328 01/15/22  2020 01/15/22  1114 01/15/22  0106 01/15/22  0106   INR  1.17*  --  1.18* 1.16*  --  1.22*  --  1.28*  --   --    < > 1.34*   APTT seconds 83.7*  --   --  83.3*  --  67.0*  --  69.6* 85.6*   < >  --  106.0*   HEMOGLOBIN g/dL 11.2* 10.7*  --  11.7* 11.9* 11.6*   < > 11.4*  --   --    < > 11.1*   HEMATOCRIT % 34.0* 32.1*  --  36.9* 36.2* 36.2*   < > 35.9*  --   --    < > 33.8*   PLATELETS 10*3/mm3 270  --   --  285  --  291  --  254  --   --   --  243    < > = values in this interval not displayed.     Prior to admission anticoagulation: Warfarin 5 mg WedSat 7.5mg all other days    Hospital Anticoagulation:  Consulting provider: Yumiko Flores  Start date: 1/18  Indication: mechanical aortic valve chronic anticoagulant required  Target INR: 2.5-3.5  Expected duration: indefinite   Bridge Therapy: Yes  with unfractionated heparin    Potential food or drug interactions: none of concern    Education complete?/Date: No; plan for follow up TBD    Assessment/Plan:  Dose: 7.5 mg today instead of 5mg, reassess INR and monitor for restart of home regimen   Monitor for any signs or symptoms of bleeding  Follow up daily INRs and dose adjustments  Will discontinue the heparin drip one therapeutic INR is achieved    Pharmacy will continue to follow until discharge or discontinuation of warfarin.     Tianna Grande [facility]  Clinical Pharmacist

## 2022-01-19 NOTE — PLAN OF CARE
Goal Outcome Evaluation:  Plan of Care Reviewed With: patient        Progress: improving  Outcome Summary: VSS, INR subtherapeutic, cards ok with this. Plan to DC home today.

## 2022-01-19 NOTE — PLAN OF CARE
Problem: Adult Inpatient Plan of Care  Goal: Plan of Care Review  Flowsheets (Taken 1/19/2022 8173)  Progress: improving  Plan of Care Reviewed With: patient  Outcome Summary: continues on heparin drip denies pain dressing to right flank dry and intact moderate amount of bruising present in right flank   Goal Outcome Evaluation:  Plan of Care Reviewed With: patient        Progress: improving  Outcome Summary: continues on heparin drip denies pain dressing to right flank dry and intact moderate amount of bruising present in right flank

## 2022-01-20 ENCOUNTER — READMISSION MANAGEMENT (OUTPATIENT)
Dept: CALL CENTER | Facility: HOSPITAL | Age: 67
End: 2022-01-20

## 2022-01-25 ENCOUNTER — READMISSION MANAGEMENT (OUTPATIENT)
Dept: CALL CENTER | Facility: HOSPITAL | Age: 67
End: 2022-01-25

## 2022-01-25 NOTE — OUTREACH NOTE
CT Surgery Week 1 Survey      Responses   [facility] patient discharged from? [place]   Does the patient have one of the following disease processes/diagnoses(primary or secondary)? Cardiothoracic surgery   Week 1 attempt successful? Yes   Call start time 0923   Call end time 0936   Discharge diagnosis Hemothorax    Person spoke with today (if not patient) and relationship pt and [place]   Meds reviewed with patient/caregiver? Yes   Is the patient having any side effects they believe may be caused by any medication additions or changes? No   Does the patient have all medications related to this admission filled (includes all antibiotics, pain medications, cardiac medications, etc.) Yes   Is the patient taking all medications as directed (includes completed medication regime)? Yes   Does the patient have a primary care provider?  Yes   Does the patient have an appointment scheduled with their C/T surgeon? Yes   Has the patient kept scheduled appointments due by today? Yes  [has had INR lab appts]   Has home health visited the patient within 72 hours of discharge? N/A   Psychosocial issues? No   Did the patient receive a copy of their discharge instructions? Yes   Nursing interventions Reviewed instructions with patient   What is the patient's perception of their health status since discharge? Improving   Nursing interventions Nurse provided patient education   Is the patient/caregiver able to teach back normal signs of recovery? Pain or discomfort at incisional site   Nursing interventions Reassured on normal signs of recovery   Is the patient /caregiver able to teach back basic post-op care? Continue use of incentive spirometry at least 1 week post discharge,  Practice 'cough and deep breath',  Drive as instructed by MD in discharge instructions,  Take showers only when approved by MD-sponge bathe until then,  No tub bath, swimming, or hot tub until instructed by MD,  Keep incision areas clean, dry and  protected,  Do not remove steri-strips,  Lifting as instructed by MD in discharge instructions  [using Respirex]   Is the patient/caregiver able to teach back signs and symptoms of incisional infection? Increased redness, swelling or pain at the incisonal site,  Increased drainage or bleeding,  Incisional warmth,  Pus or odor from incision,  Fever   Is the patient/caregiver able to teach back steps to recovery at home? Set small, achievable goals for return to baseline health,  Rest and rebuild strength, gradually increase activity,  Eat a well-balance diet   If the patient is a current smoker, are they able to teach back resources for cessation? Not a smoker   Is the patient/caregiver able to teach back the hierarchy of who to call/visit for symptoms/problems? PCP, Specialist, Home health nurse, Urgent Care, ED, 911 Yes   Additional teach back comments pt states having no pain, or SOB, has some weakness, is working on building stamina   Week 1 call completed? Yes            Liza Lancaster RN

## 2022-02-02 ENCOUNTER — READMISSION MANAGEMENT (OUTPATIENT)
Dept: CALL CENTER | Facility: HOSPITAL | Age: 67
End: 2022-02-02

## 2022-02-02 NOTE — OUTREACH NOTE
CT Surgery Week 2 Survey      Responses   [facility] [place] operated by [facility] patient discharged from? [place]   Does the patient have one of the following disease processes/diagnoses(primary or secondary)? Cardiothoracic surgery   Week 2 attempt successful? Yes   Call start time 0919   Call end time 0925   Discharge diagnosis Hemothorax    Meds reviewed with patient/caregiver? Yes   Is the patient having any side effects they believe may be caused by any medication additions or changes? No   Does the patient have all medications related to this admission filled (includes all antibiotics, pain medications, cardiac medications, etc.) Yes   Is the patient taking all medications as directed (includes completed medication regime)? Yes   Does the patient have a primary care provider?  Yes   Does the patient have an appointment scheduled with their C/T surgeon? Yes   Comments regarding PCP Sees surgeon on 2/9/22   Has the patient kept scheduled appointments due by today? N/A   Has home health visited the patient within 72 hours of discharge? N/A   Psychosocial issues? No   Did the patient receive a copy of their discharge instructions? Yes   Nursing interventions Reviewed instructions with patient   What is the patient's perception of their health status since discharge? Improving   Nursing interventions Nurse provided patient education   Is the patient/caregiver able to teach back normal signs of recovery? Pain or discomfort at incisional site   Nursing interventions Reassured on normal signs of recovery   Is the patient /caregiver able to teach back basic post-op care? Continue use of incentive spirometry at least 1 week post discharge,  Practice 'cough and deep breath',  Drive as instructed by MD in discharge instructions,  Take showers only when approved by MD-sponge bathe until then,  No tub bath, swimming, or hot tub until instructed by MD,  Keep incision areas clean, dry and protected,  Do not remove steri-strips,  Lifting as  instructed by MD in discharge instructions   Is the patient/caregiver able to teach back signs and symptoms of incisional infection? Increased redness, swelling or pain at the incisonal site,  Increased drainage or bleeding,  Incisional warmth,  Pus or odor from incision,  Fever   Is the patient/caregiver able to teach back steps to recovery at home? Set small, achievable goals for return to baseline health,  Rest and rebuild strength, gradually increase activity,  Eat a well-balance diet   If the patient is a current smoker, are they able to teach back resources for cessation? Not a smoker   Is the patient/caregiver able to teach back the hierarchy of who to call/visit for symptoms/problems? PCP, Specialist, Home health nurse, Urgent Care, ED, 911 Yes   Additional teach back comments pt states having no pain, or SOB, has some weakness, but getting better every day   Week 2 call completed? Yes          Romaine Calle RN

## 2022-02-09 ENCOUNTER — READMISSION MANAGEMENT (OUTPATIENT)
Dept: CALL CENTER | Facility: HOSPITAL | Age: 67
End: 2022-02-09

## 2022-02-09 ENCOUNTER — HOSPITAL ENCOUNTER (OUTPATIENT)
Dept: GENERAL RADIOLOGY | Facility: HOSPITAL | Age: 67
Discharge: HOME | End: 2022-02-09
Admitting: NURSE PRACTITIONER

## 2022-02-09 ENCOUNTER — OFFICE VISIT (OUTPATIENT)
Dept: SURGERY | Facility: CLINIC | Age: 67
End: 2022-02-09

## 2022-02-09 VITALS
BODY MASS INDEX: 31.07 KG/M2 | HEIGHT: 70 IN | SYSTOLIC BLOOD PRESSURE: 140 MMHG | DIASTOLIC BLOOD PRESSURE: 80 MMHG | WEIGHT: 217 LBS | OXYGEN SATURATION: 98 % | HEART RATE: 64 BPM

## 2022-02-09 DIAGNOSIS — J94.2 HEMOTHORAX: ICD-10-CM

## 2022-02-09 DIAGNOSIS — T81.49XA POSTOPERATIVE WOUND INFECTION: Primary | ICD-10-CM

## 2022-02-09 PROCEDURE — 71046 X-RAY EXAM CHEST 2 VIEWS: CPT

## 2022-02-09 PROCEDURE — 99024 POSTOP FOLLOW-UP VISIT: CPT | Performed by: NURSE PRACTITIONER

## 2022-02-09 RX ORDER — CEPHALEXIN 500 MG/1
500 CAPSULE ORAL 4 TIMES DAILY
Qty: 28 CAPSULE | Refills: 0 | Status: SHIPPED | OUTPATIENT
Start: 2022-02-09 | End: 2022-02-16

## 2022-02-09 NOTE — OUTREACH NOTE
CT Surgery Week 3 Survey      Responses   [facility] patient discharged from? [place]   Does the patient have one of the following disease processes/diagnoses(primary or secondary)? Cardiothoracic surgery   Week 3 attempt successful? Yes   Call start time 1008   Call end time 1009   Discharge diagnosis Hemothorax    Meds reviewed with patient/caregiver? Yes   Is the patient having any side effects they believe may be caused by any medication additions or changes? No   Does the patient have all medications related to this admission filled (includes all antibiotics, pain medications, cardiac medications, etc.) Yes   Is the patient taking all medications as directed (includes completed medication regime)? Yes   Does the patient have a primary care provider?  Yes   Does the patient have an appointment scheduled with their C/T surgeon? Yes   Comments regarding PCP Sees surgeon today on 2/9/2022   Has the patient kept scheduled appointments due by today? Yes   Has home health visited the patient within 72 hours of discharge? N/A   Psychosocial issues? No   Did the patient receive a copy of their discharge instructions? Yes   Nursing interventions Reviewed instructions with patient   What is the patient's perception of their health status since discharge? Improving   Nursing interventions Nurse provided patient education   Is the patient/caregiver able to teach back normal signs of recovery? Pain or discomfort at incisional site   Nursing interventions Reassured on normal signs of recovery   Is the patient /caregiver able to teach back basic post-op care? Continue use of incentive spirometry at least 1 week post discharge,  Practice 'cough and deep breath',  Drive as instructed by MD in discharge instructions,  Take showers only when approved by MD-sponge bathe until then,  No tub bath, swimming, or hot tub until instructed by MD,  Keep incision areas clean, dry and protected,  Do not remove steri-strips,  Lifting  as instructed by MD in discharge instructions   Is the patient/caregiver able to teach back signs and symptoms of incisional infection? Increased redness, swelling or pain at the incisonal site,  Increased drainage or bleeding,  Incisional warmth,  Pus or odor from incision,  Fever   Is the patient/caregiver able to teach back steps to recovery at home? Set small, achievable goals for return to baseline health,  Rest and rebuild strength, gradually increase activity,  Eat a well-balance diet   Is the patient /caregiver able to teach back the importance of cardiac rehab? Yes   If the patient is a current smoker, are they able to teach back resources for cessation? Not a smoker   Is the patient/caregiver able to teach back the hierarchy of who to call/visit for symptoms/problems? PCP, Specialist, Home health nurse, Urgent Care, ED, 911 Yes   Additional teach back comments pt states having no pain, or SOB, has some weakness, but getting better every day   Week 3 call completed? Yes          Romaine Calle RN

## 2022-02-09 NOTE — PROGRESS NOTES
Chief Complaint  Hemothorax, follow-up, postoperative care    Subjective          Claudio Krishna presents to [facility] for follow-up.    History of Present Illness     Claudio Krishna is a very pleasant 66-year-old gentleman who we encountered during a recent hospitalization.  He had apparently been battling a respiratory infection with significant coughing when he developed quite a large area of ecchymosis across his right chest and flank.  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  Chest x-ray identified a pleural effusion for which he underwent thoracentesis with 500 cc of bloody fluid removed from his right chest.  Unfortunately, the fluid reaccumulated.  The patient suffered a cardiac event and was transferred to the ICU where a right chest tube was placed emergently and he was ultimately taken to surgery for a right VAT with evacuation of the hemothorax and right lung decortication.  Mr. Krishna did very well postoperatively.  His chest tubes were removed and he was ultimately discharged home.  He presents today for his first postoperative visit.  He reports he has been doing well.  He denies any pain and has not been taking any pain medication.  He has some occasional tremor in his hands but this is improved since he was hospitalized.  At that point time we attributed it to asterixis secondary to gabapentin which was stopped and his symptoms improved.  His chest wall incisions have healed very nicely with the exception of the previous chest tube site which has since opened up.  He denies any pain or drainage from the site.  His only other complaint is that his chest wall on the right appears more swollen than that on the left.  He has had no fever, chills, hemoptysis, shortness of air, chest wall pain, night sweats or other pulmonary complaints.    Objective   Vital Signs:   /80 (BP Location: Right arm, Patient Position: Sitting, Cuff Size: Adult)  "  Pulse 64   Ht 177.8 cm (70\")   Wt 98.4 kg (217 lb)   SpO2 98%   BMI 31.14 kg/m²     Physical Exam  Vitals and nursing note reviewed.   Cardiovascular:      Rate and Rhythm: Normal rate and regular rhythm.      Heart sounds: Murmur heard.        Comments: Mechanical valve sounds  Pulmonary:      Effort: Pulmonary effort is normal.      Breath sounds: Normal breath sounds. No wheezing, rhonchi or rales.   Chest:      Chest wall: Swelling (Minimal postoperative chest wall swelling) present. No tenderness.      Comments: Surgical incisions have healed nicely.  No drainage.  Chest tube site is clean and dry.  It is slightly open but healing well with granulomatous tissue.  Mild erythema.  Abdominal:      General: Bowel sounds are normal.      Palpations: Abdomen is soft.   Musculoskeletal:         General: Normal range of motion.   Skin:     General: Skin is warm and dry.   Neurological:      Mental Status: He is alert. Mental status is at baseline.        Result Review :   The following data was reviewed by: DWAYNE Martin on 02/09/2022:    Data reviewed: Radiologic studies PA and lateral chest x-ray performed per the patient's appointment today was independently reviewed.  Very tiny right pleural effusion/pleural thickening is stable.  No new consolidation or pneumothorax.  Sternotomy wires intact.  Cardiac valve annuloplasty ring is visible.          Assessment and Plan    Diagnoses and all orders for this visit:    1. Postoperative wound infection (Primary)  -     cephalexin (Keflex) 500 MG capsule; Take 1 capsule by mouth 4 (Four) Times a Day for 7 days.  Dispense: 28 capsule; Refill: 0    2. Hemothorax  -     CT Chest Without Contrast Diagnostic; Future    Mr. Krishna has made an excellent recovery from his surgery.  He denies any new pulmonary complaints today.  Chest x-ray continues to improve compared to previous imaging, with some expected postoperative changes.  His chest tube site is slightly open. "  I clean the site with peroxide and Betadine and recommended that they continue to do the same twice daily until it scabs over.  There is minimal erythema around the site so I did send some Keflex over to his pharmacy.  Recommend he follow-up to see us in 4 months with a CT of the chest without contrast.  Of course, we are happy to see him in the interim, should the need arise.  I do not think the hand tremor is secondary to asterixis at this point.  I did recommend he discuss this with his primary care provider as he may have an essential tremor.  Thank you for allowing us to participate in the care of Claudio Krishna.  We will continue to keep you informed of his progress.      Follow Up   No follow-ups on file.  Patient was given instructions and counseling regarding his condition or for health maintenance advice. Please see specific information pulled into the AVS if appropriate.

## 2022-02-11 LAB
QT INTERVAL: 376 MS
QT INTERVAL: 421 MS

## 2022-02-17 ENCOUNTER — READMISSION MANAGEMENT (OUTPATIENT)
Dept: CALL CENTER | Facility: HOSPITAL | Age: 67
End: 2022-02-17

## 2022-02-17 NOTE — OUTREACH NOTE
CT Surgery Week 4 Survey      Responses   [facility] patient discharged from? [facility]   Does the patient have one of the following disease processes/diagnoses(primary or secondary)? Cardiothoracic surgery   Week 4 attempt successful? Yes   Call start time 1556   Call end time 1558   Discharge diagnosis Hemothorax    Person spoke with today (if not patient) and relationship patient   Meds reviewed with patient/caregiver? Yes   Is the patient having any side effects they believe may be caused by any medication additions or changes? No   Is the patient taking all medications as directed (includes completed medication regime)? Yes   Has the patient kept scheduled appointments due by today? Yes   Is the patient still receiving Home Health Services? N/A   Psychosocial issues? No   What is the patient's perception of their health status since discharge? Improving   Is the patient /caregiver able to teach back the importance of cardiac rehab? Yes   Is the patient/caregiver able to teach back the hierarchy of who to call/visit for symptoms/problems? PCP, Specialist, Home health nurse, Urgent Care, ED, 911 Yes   Week 4 Call Completed? Yes   Would the patient like one additional call? No   Graduated Yes   Is the patient interested in additional calls from an ambulatory ?  NOTE:  applies to high risk patients requiring additional follow-up. No   Did the patient feel the follow up calls were helpful during their recovery period? Yes   Was the number of calls appropriate? Yes   Wrap up additional comments Pt reports he is doing well.  he denies needs at this time.           Alma Tran RN

## 2022-04-27 NOTE — PROGRESS NOTES
Claudio Krishna   66 y.o.  male    LOS: 12 days   Patient Care Team:  Shayan Celeste MD as PCP - General (Family Medicine)      Subjective     Interval History:     Patient Complaints: No complaints of any shortness of air. He has no significant chest pain. Not feeling any palpitations or lightheadedness.    Review of Systems:   No subjective fever or chills  Still has chest tubes on the right. Does not look like any significant bleeding  Medication Review:   Current Facility-Administered Medications:   •  acetaminophen (TYLENOL) tablet 1,000 mg, 1,000 mg, Oral, TID, Leyda Rodriguez, APRN, 1,000 mg at 01/14/22 0808  •  acetaminophen (TYLENOL) tablet 650 mg, 650 mg, Oral, Q4H PRN, Leyda Rodriguez APRN, 650 mg at 01/10/22 1640  •  atorvastatin (LIPITOR) tablet 10 mg, 10 mg, Oral, Daily, Leyda Rodriguez, APRN, 10 mg at 01/14/22 0809  •  benzonatate (TESSALON) capsule 100 mg, 100 mg, Oral, TID PRN, Leyda Rodriguez, APRN, 100 mg at 01/07/22 0933  •  bisacodyl (DULCOLAX) suppository 10 mg, 10 mg, Rectal, Daily PRN, Leyda Rodriguez APRN  •  celecoxib (CeleBREX) capsule 100 mg, 100 mg, Oral, Q12H, Leyda Rodriguez, APRN, 100 mg at 01/14/22 0808  •  docusate sodium (COLACE) capsule 100 mg, 100 mg, Oral, BID, Leyda Rodriguez APRN, 100 mg at 01/14/22 0809  •  gabapentin (NEURONTIN) capsule 300 mg, 300 mg, Oral, Q8H, Leyda Rodriguez, APRN, 300 mg at 01/14/22 0627  •  heparin 13909 units/250 mL (100 units/mL) in 0.45 % NaCl infusion, 12 Units/kg/hr, Intravenous, Titrated, TrimburColin MD  •  Hold medication, 1 each, Does not apply, Continuous PRN, Leyda Rodriguez APRN  •  HYDROcodone-acetaminophen (NORCO) 5-325 MG per tablet 1 tablet, 1 tablet, Oral, Q6H PRN, Leyda Rodriguez APRN  •  HYDROmorphone (DILAUDID) injection 0.5 mg, 0.5 mg, Intravenous, Q2H PRN, Leyda Rodriguez APRN, 0.5 mg at 01/12/22 1645  •  ipratropium-albuterol (DUO-NEB) nebulizer solution 3 mL, 3 mL, Nebulization, Q8H - RT, Leyda Rodriguez APRN,  Pt wondering when he could get his second COVID booster, as he is scheduled for an ablation on 5/12. Discussed with pt that would best to try and have next week in the case the pt has any side effects from the shot, it fever.  Pt stats that he has not had side effects from the other shots, but will try and have done next week. [place]   3 mL at 01/13/22 2315  •  ketamine hcl syringe solution prefilled syringe 10 mg, 10 mg, Intravenous, Q15 Min PRN, Leyda Rodriguez APRN  •  lactated ringers infusion, 9 mL/hr, Intravenous, Continuous, Leyda Rodriguez APRN, Last Rate: 9 mL/hr at 01/13/22 0553, 9 mL/hr at 01/13/22 0553  •  magnesium hydroxide (MILK OF MAGNESIA) suspension 10 mL, 10 mL, Oral, Daily PRN, Leyda Rodriguez APRN  •  melatonin tablet 3 mg, 3 mg, Oral, Nightly PRN, Leyda Rodriguez APRN, 3 mg at 01/10/22 0120  •  morphine injection 2 mg, 2 mg, Intravenous, Q4H PRN, Leyda Rodriguez APRN, 2 mg at 01/11/22 2317  •  nitroglycerin (NITROSTAT) SL tablet 0.4 mg, 0.4 mg, Sublingual, Q5 Min PRN, Leyda Rodriguez APRN  •  ondansetron (ZOFRAN) tablet 4 mg, 4 mg, Oral, Q6H PRN **OR** ondansetron (ZOFRAN) injection 4 mg, 4 mg, Intravenous, Q6H PRN, Leyda Rodriguez APRN  •  oxyCODONE (ROXICODONE) immediate release tablet 5 mg, 5 mg, Oral, Q4H PRN, Leyda Rodriguez APRN  •  polyethylene glycol (MIRALAX) packet 17 g, 17 g, Oral, Daily, Leyda Rodriguez APRN, 17 g at 01/14/22 0809  •  sennosides-docusate (PERICOLACE) 8.6-50 MG per tablet 2 tablet, 2 tablet, Oral, Nightly, Leyda Rodriguez APRN, 2 tablet at 01/13/22 2117  •  [COMPLETED] Insert peripheral IV, , , Once **AND** sodium chloride 0.9 % flush 10 mL, 10 mL, Intravenous, PRN, Leyda Rodriguez APRN      Objective     Vital Sign Min/Max for last 24 hours  Temp  Min: 98.5 °F (36.9 °C)  Max: 98.8 °F (37.1 °C)   BP  Min: 123/66  Max: 164/85    Pulse  Min: 64  Max: 98     Wt Readings from Last 3 Encounters:   01/14/22 105 kg (231 lb 14.8 oz)        Intake/Output Summary (Last 24 hours) at 1/14/2022 1105  Last data filed at 1/14/2022 0800  Gross per 24 hour   Intake 60 ml   Output 1434 ml   Net -1374 ml     Physical Exam:      General Appearance:   Overweight male in no acute distress   Head:    Normocephalic, atraumatic   Eyes:            Conjunctivae normal, anicteric, no xanthelasma   Neck:   no  carotid bruit, no JVD   Lungs:    Fairly clear on the left, chest tubes on the right    Heart:    Regular rate and rhythm.  Normal S1, mechanical aortic valve sounds are good. 1/6 systolic murmur, no gallop, rub or lift.    Chest Wall:    No abnormalities observed   Abdomen:     Normal bowel sounds, no masses, no organomegaly, soft        nontender, nondistended, no guarding, no rebound                tenderness   Rectal:     Deferred   Extremities:   No clubbing, cyanosis or edema.         Skin:   No bleeding, bruising or rash   Neurologic:   awake alert and oriented x3, speech clear and approp, no facial drooping      Monitor:  nsr  Results Review:     I reviewed the patient's new clinical results.    Sodium Sodium   Date Value Ref Range Status   01/14/2022 134 (L) 136 - 145 mmol/L Final   01/13/2022 134 (L) 136 - 145 mmol/L Final   01/12/2022 131 (L) 136 - 145 mmol/L Final   01/12/2022 132 (L) 136 - 145 mmol/L Final      Potassium Potassium   Date Value Ref Range Status   01/14/2022 4.0 3.5 - 5.2 mmol/L Final   01/13/2022 4.6 3.5 - 5.2 mmol/L Final   01/12/2022 4.6 3.5 - 5.2 mmol/L Final   01/12/2022 4.3 3.5 - 5.2 mmol/L Final      Chloride Chloride   Date Value Ref Range Status   01/14/2022 100 98 - 107 mmol/L Final   01/13/2022 100 98 - 107 mmol/L Final   01/12/2022 100 98 - 107 mmol/L Final   01/12/2022 97 (L) 98 - 107 mmol/L Final      Bicarbonate No results found for: PLASMABICARB   BUN BUN   Date Value Ref Range Status   01/14/2022 14 8 - 23 mg/dL Final   01/13/2022 11 8 - 23 mg/dL Final   01/12/2022 11 8 - 23 mg/dL Final   01/12/2022 11 8 - 23 mg/dL Final      Creatinine Creatinine   Date Value Ref Range Status   01/14/2022 0.67 (L) 0.76 - 1.27 mg/dL Final   01/13/2022 0.53 (L) 0.76 - 1.27 mg/dL Final   01/12/2022 0.56 (L) 0.76 - 1.27 mg/dL Final   01/12/2022 0.55 (L) 0.76 - 1.27 mg/dL Final      Calcium Calcium   Date Value Ref Range Status   01/14/2022 8.0 (L) 8.6 - 10.5 mg/dL Final   01/13/2022 8.3 (L)  8.6 - 10.5 mg/dL Final   01/12/2022 7.9 (L) 8.6 - 10.5 mg/dL Final   01/12/2022 8.2 (L) 8.6 - 10.5 mg/dL Final      Magnesium No results found for: MG     Results from last 7 days   Lab Units 01/14/22  0337   WBC 10*3/mm3 7.77   HEMOGLOBIN g/dL 10.8*   HEMATOCRIT % 32.9*   PLATELETS 10*3/mm3 257     Lab Results   Lab Value Date/Time    TROPONINT <0.010 01/10/2022 0257     No results found for: CHOL  No results found for: HDL  No results found for: LDL  No results found for: TRIG  No components found for: CHOLHDL    Results from last 7 days   Lab Units 01/14/22  0337 01/13/22  0405 01/12/22  0349   INR  1.76* 2.20* 1.94*         Echo EF Estimated  No results found for: ECHOEFEST       Assessment/ Plan  1. Hemothorax; code on 1/9/2022.  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 right-sided pleural effusion        -s/p R thoracentesis 1/3/21:500cc of bloody fluid removed  4. HTN  5.  Sudden decompensation on 1/9/2022 with severe bradycardia and required some CPR for resuscitation.  Appears to be related to increase in the hemothorax   6.Bronchoscopy, right video-assisted thoracoscopy with complete decortication and evacuation of hemothorax on 1/12/2022  Plan #1 sinus rhythm. Blood pressure beginning to run a little high. If it stays up will reinitiate one of his antihypertensives he was on previously.  2. Valve sounds good on exam. Patient's nurse informed me that thoracic surgery said it was okay to resume heparin drip. I have ordered that without a bolus.  3. Otherwise looks very good and still awaiting a bed out of the CCU. Wife had not come in yet.        Colin Yun MD  01/14/22  11:05 EST      Time: 18 minutes

## 2022-06-06 ENCOUNTER — HOSPITAL ENCOUNTER (OUTPATIENT)
Dept: CT IMAGING | Facility: HOSPITAL | Age: 67
Discharge: HOME | End: 2022-06-06
Admitting: NURSE PRACTITIONER

## 2022-06-06 DIAGNOSIS — J94.2 HEMOTHORAX: ICD-10-CM

## 2022-06-06 PROCEDURE — 71250 CT THORAX DX C-: CPT

## 2022-06-13 ENCOUNTER — OFFICE VISIT (OUTPATIENT)
Dept: SURGERY | Facility: CLINIC | Age: 67
End: 2022-06-13

## 2022-06-13 VITALS
OXYGEN SATURATION: 97 % | BODY MASS INDEX: 31.32 KG/M2 | DIASTOLIC BLOOD PRESSURE: 82 MMHG | HEART RATE: 72 BPM | SYSTOLIC BLOOD PRESSURE: 142 MMHG | WEIGHT: 218.8 LBS | HEIGHT: 70 IN

## 2022-06-13 DIAGNOSIS — S30.1XXD ABDOMINAL WALL HEMATOMA, SUBSEQUENT ENCOUNTER: ICD-10-CM

## 2022-06-13 DIAGNOSIS — J94.2 HEMOTHORAX: Primary | ICD-10-CM

## 2022-06-13 PROCEDURE — 99212 OFFICE O/P EST SF 10 MIN: CPT | Performed by: THORACIC SURGERY (CARDIOTHORACIC VASCULAR SURGERY)

## 2022-06-13 RX ORDER — PROPRANOLOL HCL 60 MG
CAPSULE, EXTENDED RELEASE 24HR ORAL
COMMUNITY
Start: 2022-04-12

## 2022-06-13 NOTE — PROGRESS NOTES
"Chief Complaint  Chest wall hematoma, hemothorax  Subjective        Claudio Krishna presents to [facility]AC[facility]  History of Present Illness  Mr. Krishna is a pleasant 66-year-old gentleman who presents today in follow-up for his recent admission for hemothorax and chest wall hematoma.  He is doing very well.  He has no complaints.  He is back on his blood thinners and has had no further difficulties.  Objective   Vital Signs:  /82 (BP Location: Left arm, Patient Position: Sitting, Cuff Size: Adult)   Pulse 72   Ht 177.8 cm (70\")   Wt 99.2 kg (218 lb 12.8 oz)   SpO2 97%   BMI 31.39 kg/m²   Estimated body mass index is 31.39 kg/m² as calculated from the following:    Height as of this encounter: 177.8 cm (70\").    Weight as of this encounter: 99.2 kg (218 lb 12.8 oz).        Physical Exam  Vitals and nursing note reviewed.   Constitutional:       Appearance: He is well-developed.   HENT:      Head: Normocephalic and atraumatic.      Nose: Nose normal.   Eyes:      Conjunctiva/sclera: Conjunctivae normal.   Pulmonary:      Effort: Pulmonary effort is normal.   Musculoskeletal:         General: Normal range of motion.      Cervical back: Normal range of motion and neck supple.   Skin:     General: Skin is warm and dry.   Neurological:      Mental Status: He is alert and oriented to person, place, and time.   Psychiatric:         Behavior: Behavior normal.         Thought Content: Thought content normal.         Judgment: Judgment normal.        Result Review :           I have independently reviewed the CT of the chest performed on 6/6/2022 which demonstrates near complete resolution of the pleural effusion.  No nodules.  There is some scarring in the right lung consistent with his prior effusion.  There is no mediastinal or hilar lymphadenopathy.  There is some granulomas that are calcified in the left lung.  No pleural or pericardial effusion.     Assessment and Plan   Diagnoses and " all orders for this visit:    1. Hemothorax (Primary)    2. Abdominal wall hematoma, subsequent encounter      Mr. Krishna is a very pleasant 66-year-old gentleman who was treated for a hemothorax and chest wall hematoma.  His CT of the chest shows resolution with only a small band of residual scarring.  I do not think that he needs any further follow-up for this.  We have discussed that if he experiences any upper respiratory illnesses, he will need to follow-up with his primary care physician promptly.  He has Tessalon Perles at home if he develops a cough.  He will plan to call me with questions or concerns.     I spent 15 minutes caring for Claudio on this date of service. This time includes time spent by me in the following activities:preparing for the visit, reviewing tests, obtaining and/or reviewing a separately obtained history, performing a medically appropriate examination and/or evaluation , counseling and educating the patient/family/caregiver, ordering medications, tests, or procedures, documenting information in the medical record and independently interpreting results and communicating that information with the patient/family/caregiver  Follow Up   No follow-ups on file.  Patient was given instructions and counseling regarding his condition or for health maintenance advice. Please see specific information pulled into the AVS if appropriate.

## (undated) DEVICE — PENCL ES MEGADINE EZ/CLEAN BUTN W/HOLSTR 10FT

## (undated) DEVICE — OASIS DRAIN, SINGLE, INLINE & ATS COMPATIBLE: Brand: OASIS

## (undated) DEVICE — VISUALIZATION SYSTEM: Brand: CLEARIFY

## (undated) DEVICE — ADHS SKIN SURG TISS VISC PREMIERPRO EXOFIN HI/VISC FAST/DRY

## (undated) DEVICE — APPL CHLORAPREP HI/LITE 26ML ORNG

## (undated) DEVICE — LOU THORACIC: Brand: MEDLINE INDUSTRIES, INC.

## (undated) DEVICE — ANTIBACTERIAL UNDYED BRAIDED (POLYGLACTIN 910), SYNTHETIC ABSORBABLE SUTURE: Brand: COATED VICRYL

## (undated) DEVICE — NDL HYPO PRECISIONGLIDE REG 20G 1 1/2

## (undated) DEVICE — SUT SILK 0 PSL 18IN 580H

## (undated) DEVICE — PATIENT RETURN ELECTRODE, SINGLE-USE, CONTACT QUALITY MONITORING, ADULT, WITH 9FT CORD, FOR PATIENTS WEIGING OVER 33LBS. (15KG): Brand: MEGADYNE

## (undated) DEVICE — 28 FR STRAIGHT – SOFT PVC CATHETER: Brand: PVC THORACIC CATHETERS

## (undated) DEVICE — WOUND RETRACTOR AND PROTECTOR: Brand: ALEXIS WOUND PROTECTOR-RETRACTOR

## (undated) DEVICE — GLV SURG BIOGEL LTX PF 6

## (undated) DEVICE — EXTENSION SET, MALE LUER LOCK ADAPTER WITH RETRACTABLE COLLAR

## (undated) DEVICE — ELECTRD BLD EZ CLN MOD XLNG 2.75IN

## (undated) DEVICE — UNIVERSAL PACK: Brand: CARDINAL HEALTH